# Patient Record
Sex: MALE | Race: WHITE | NOT HISPANIC OR LATINO | Employment: FULL TIME | URBAN - METROPOLITAN AREA
[De-identification: names, ages, dates, MRNs, and addresses within clinical notes are randomized per-mention and may not be internally consistent; named-entity substitution may affect disease eponyms.]

---

## 2018-01-09 NOTE — RESULT NOTES
Message   cxray shows evidence of chronic obstructive pulmonary disease without acute pulmonary infiltrates  labs wnl  Verified Results  * XR CHEST PA & LATERAL 21Jun2016 09:07AM Myranda Gold   TW Order Number: [id number]     Test Name Result Flag Reference   XR CHEST PA & LATERAL (Report)     CHEST - DUAL ENERGY     INDICATION: Chronic obstructive pulmonary disease     COMPARISON: April 29, 2015     VIEWS: PA (including soft tissue/bone algorithms) and lateral ; 4 images     FINDINGS:     The cardiomediastinal silhouette is unremarkable  Mild hyperaeration of the lungs without acute pulmonary consolidation  No pleural effusions  No pneumothorax  Bony thorax is unremarkable  IMPRESSION:     Evidence of chronic obstructive pulmonary disease without acute pulmonary infiltrates          Workstation performed: [id number]     Signed by:   Brian Nagy MD   6/22/16     (1) COMPREHENSIVE METABOLIC PANEL [id number] 20:27SU Myranda Gold     Test Name Result Flag Reference   Glucose, Serum 86 mg/dL  65-99   BUN 15 mg/dL  8-27   Creatinine, Serum 0 97 mg/dL  0 76-1 27   eGFR If NonAfricn Am 83 mL/min/1 73  >59   eGFR If Africn Am 96 mL/min/1 73  >59   BUN/Creatinine Ratio 15  10-22   Sodium, Serum 138 mmol/L  134-144   Potassium, Serum 5 3 mmol/L H 3 5-5 2   Chloride, Serum 99 mmol/L     Carbon Dioxide, Total 24 mmol/L  18-29   Calcium, Serum 9 7 mg/dL  8 6-10 2   Protein, Total, Serum 6 3 g/dL  6 0-8 5   Albumin, Serum 4 2 g/dL  3 6-4 8   Globulin, Total 2 1 g/dL  1 5-4 5   A/G Ratio 2 0  1 1-2 5   Bilirubin, Total <0 2 mg/dL  0 0-1 2   Alkaline Phosphatase, S 65 IU/L     AST (SGOT) 13 IU/L  0-40   ALT (SGPT) 9 IU/L  0-44     (1) CBC/PLT/DIFF 21Jun2016 12:00AM Myranda Gold     Test Name Result Flag Reference   WBC 7 1 x10E3/uL  3 4-10 8   RBC 4 93 x10E6/uL  4 14-5 80   Hemoglobin 14 8 g/dL  12 6-17 7   Hematocrit 43 8 %  37 5-51 0   MCV 89 fL  79-97   MCH 30 0 pg  26 6-33 0   MCHC 33 8 g/dL  31 5-35 7   RDW 14 1 %  12 3-15 4   Platelets 038 D31T2/BS  150-379   Neutrophils 54 %     Lymphs 31 %     Monocytes 9 %     Eos 5 %     Basos 1 %     Neutrophils (Absolute) 3 9 x10E3/uL  1 4-7 0   Lymphs (Absolute) 2 2 x10E3/uL  0 7-3 1   Monocytes(Absolute) 0 7 x10E3/uL  0 1-0 9   Eos (Absolute) 0 3 x10E3/uL  0 0-0 4   Baso (Absolute) 0 0 x10E3/uL  0 0-0 2   Immature Granulocytes 0 %     Immature Grans (Abs) 0 0 x10E3/uL  0 0-0 1     (1) CK (CPK) 21Jun2016 12:00AM Herrera Velázquez     Test Name Result Flag Reference   Creatine Kinase,Total,Serum 63 U/L

## 2018-01-15 NOTE — RESULT NOTES
Message   take Vitanmin D3 and also low cholesterol diet and f-up in office as directed to discuss labs if not already scheduled  He had labs ordered by Dr Karli Lockwood  Verified Results  ([address] [place] [address]) Lipid Panel [id number] 12:00AM Serena Baca     Test Name Result Flag Reference   Cholesterol, Total 252 mg/dL H 100-199   Triglycerides 147 mg/dL  0-149   HDL Cholesterol 64 mg/dL  >39   According to ATP-III Guidelines, HDL-C >59 mg/dL is considered a  negative risk factor for CHD     VLDL Cholesterol Fran 29 mg/dL  5-40   LDL Cholesterol Calc 159 mg/dL H 0-99

## 2018-02-16 ENCOUNTER — OFFICE VISIT (OUTPATIENT)
Dept: FAMILY MEDICINE CLINIC | Facility: CLINIC | Age: 63
End: 2018-02-16
Payer: COMMERCIAL

## 2018-02-16 VITALS — BODY MASS INDEX: 19.71 KG/M2 | DIASTOLIC BLOOD PRESSURE: 64 MMHG | SYSTOLIC BLOOD PRESSURE: 110 MMHG | WEIGHT: 137.4 LBS

## 2018-02-16 DIAGNOSIS — G89.29 CHRONIC MIDLINE THORACIC BACK PAIN: ICD-10-CM

## 2018-02-16 DIAGNOSIS — Z12.5 PROSTATE CANCER SCREENING: ICD-10-CM

## 2018-02-16 DIAGNOSIS — M54.2 CHRONIC NECK PAIN: ICD-10-CM

## 2018-02-16 DIAGNOSIS — G89.29 CHRONIC NECK PAIN: ICD-10-CM

## 2018-02-16 DIAGNOSIS — M54.50 CHRONIC BILATERAL LOW BACK PAIN WITHOUT SCIATICA: Primary | ICD-10-CM

## 2018-02-16 DIAGNOSIS — E78.00 PURE HYPERCHOLESTEROLEMIA: ICD-10-CM

## 2018-02-16 DIAGNOSIS — M54.6 CHRONIC MIDLINE THORACIC BACK PAIN: ICD-10-CM

## 2018-02-16 DIAGNOSIS — G89.29 CHRONIC BILATERAL LOW BACK PAIN WITHOUT SCIATICA: Primary | ICD-10-CM

## 2018-02-16 PROBLEM — J43.8 OTHER EMPHYSEMA (HCC): Status: ACTIVE | Noted: 2018-02-16

## 2018-02-16 PROCEDURE — 99214 OFFICE O/P EST MOD 30 MIN: CPT | Performed by: FAMILY MEDICINE

## 2018-02-16 RX ORDER — PAROXETINE HYDROCHLORIDE 20 MG/1
40 TABLET, FILM COATED ORAL DAILY
COMMUNITY
End: 2021-10-04

## 2018-02-16 RX ORDER — MELOXICAM 7.5 MG/1
7.5 TABLET ORAL 2 TIMES DAILY PRN
Qty: 60 TABLET | Refills: 1 | Status: SHIPPED | OUTPATIENT
Start: 2018-02-16 | End: 2021-10-04

## 2018-02-16 RX ORDER — GABAPENTIN 100 MG/1
100 CAPSULE ORAL
Qty: 30 CAPSULE | Refills: 2 | Status: SHIPPED | OUTPATIENT
Start: 2018-02-16 | End: 2018-03-16 | Stop reason: SDUPTHER

## 2018-02-16 RX ORDER — TAMSULOSIN HYDROCHLORIDE 0.4 MG/1
1 CAPSULE ORAL
COMMUNITY
Start: 2015-10-12 | End: 2018-02-16 | Stop reason: ALTCHOICE

## 2018-02-16 NOTE — PATIENT INSTRUCTIONS
Lower Back Exercises   AMBULATORY CARE:   Lower back exercises  help heal and strengthen your back muscles to prevent another injury  Ask your healthcare provider if you need to see a physical therapist for more advanced exercises  Seek care immediately if:   · You have severe pain that prevents you from moving  Contact your healthcare provider if:   · Your pain becomes worse  · You have new pain  · You have questions or concerns about your condition or care  Do lower back exercises safely:   · Do the exercises on a mat or firm surface  (not on a bed) to support your spine and prevent low back pain  · Move slowly and smoothly  Avoid fast or jerky motions  · Breathe normally  Do not hold your breath  · Stop if you feel pain  It is normal to feel some discomfort at first  Regular exercise will help decrease your discomfort over time  Lower back exercises: Your healthcare provider may recommend that you do back exercises 10 to 30 minutes each day  He may also recommend that you do exercises 1 to 3 times each day  Ask your healthcare provider which exercises are best for you and how often to do them  · Ankle pumps:  Lie on your back  Move your foot up (with your toes pointing toward your head)  Then, move your foot down (with your toes pointing away from you)  Repeat this exercise 10 times on each side  · Heel slides:  Lie on your back  Slowly bend one leg and then straighten it  Next, bend the other leg and then straighten it  Repeat 10 times on each side  · Pelvic tilt:  Lie on your back with your knees bent and feet flat on the floor  Place your arms in a relaxed position beside your body  Tighten the muscles of your abdomen and flatten your back against the floor  Hold for 5 seconds  Repeat 5 times  · Back stretch:  Lie on your back with your hands behind your head  Bend your knees and turn the lower half of your body to one side   Hold this position for 10 seconds  Repeat 3 times on each side  · Straight leg raises:  Lie on your back with one leg straight  Bend the other knee  Tighten your abdomen and then slowly lift the straight leg up about 6 to 12 inches off the floor  Hold for 1 to 5 seconds  Lower your leg slowly  Repeat 10 times on each leg  · Knee-to-chest:  Lie on your back with your knees bent and feet flat on the floor  Pull one of your knees toward your chest and hold it there for 5 seconds  Return your leg to the starting position  Lift the other knee toward your chest and hold for 5 seconds  Do this 5 times on each side  · Cat and camel:  Place your hands and knees on the floor  Arch your back upward toward the ceiling and lower your head  Round out your spine as much as you can  Hold for 5 seconds  Lift your head upward and push your chest downward toward the floor  Hold for 5 seconds  Do 3 sets or as directed  · Wall squats:  Stand with your back against a wall  Tighten the muscles of your abdomen  Slowly lower your body until your knees are bent at a 45 degree angle  Hold this position for 5 seconds  Slowly move back up to a standing position  Repeat 10 times  · Curl up:  Lie on your back with your knees bent and feet flat on the floor  Place your hands, palms down, underneath the curve in your lower back  Next, with your elbows on the floor, lift your shoulders and chest 2 to 3 inches  Keep your head in line with your shoulders  Hold this position for 5 seconds  When you can do this exercise without pain for 10 to 15 seconds, you may add a rotation  While your shoulders and chest are lifted off the ground, turn slightly to the left and hold  Repeat on the other side  · Bird dog:  Place your hands and knees on the floor  Keep your wrists directly below your shoulders and your knees directly below your hips  Pull your belly button in toward your spine  Do not flatten or arch your back   Tighten your abdominal muscles  Raise one arm straight out so that it is aligned with your head  Next, raise the leg opposite your arm  Hold this position for 15 seconds  Lower your arm and leg slowly and change sides  Do 5 sets  © 2017 [employer] Information is for End User's use only and may not be sold, redistributed or otherwise used for commercial purposes  All illustrations and images included in CareNotes® are the copyrighted property of A D A M , Inc  or Manny Garrett  The above information is an  only  It is not intended as medical advice for individual conditions or treatments  Talk to your doctor, nurse or pharmacist before following any medical regimen to see if it is safe and effective for you

## 2018-02-16 NOTE — ASSESSMENT & PLAN NOTE
Patient having issues with his breathing  Previous chest x-ray showed COPD  Start him on Spiriva 1 25-2 samples provided  Recheck in 4 months  May do pulmonary function testing  May need updated CT scan  Labs also ordered since he is overdue for routine preventative healthcare

## 2018-02-16 NOTE — PROGRESS NOTES
Assessment/Plan:      Diagnoses and all orders for this visit:    Chronic bilateral low back pain without sciatica  -     gabapentin (NEURONTIN) 100 mg capsule; Take 1 capsule (100 mg total) by mouth daily at bedtime  -     Comprehensive metabolic panel; Future  -     CBC; Future  -     Ambulatory referral to Physical Therapy; Future  -     meloxicam (MOBIC) 7 5 mg tablet; Take 1 tablet (7 5 mg total) by mouth 2 (two) times a day as needed (for back pain)    Pure hypercholesterolemia  -     Comprehensive metabolic panel; Future  -     Lipid panel; Future  -     TSH, 3rd generation; Future    Prostate cancer screening  -     PSA Total (Reflex To Free); Future    Chronic neck pain  -     Ambulatory referral to Physical Therapy; Future  -     meloxicam (MOBIC) 7 5 mg tablet; Take 1 tablet (7 5 mg total) by mouth 2 (two) times a day as needed (for back pain)    Chronic midline thoracic back pain  -     Ambulatory referral to Physical Therapy; Future  -     meloxicam (MOBIC) 7 5 mg tablet; Take 1 tablet (7 5 mg total) by mouth 2 (two) times a day as needed (for back pain)    Other orders  -     Discontinue: Umeclidinium-Vilanterol (ANORO ELLIPTA) 62 5-25 MCG/INH AEPB; Inhale 1 puff daily  -     PARoxetine (PAXIL) 20 mg tablet; Take 40 mg by mouth daily    -     Discontinue: tamsulosin (FLOMAX) 0 4 mg; Take 1 capsule by mouth          Subjective:     Patient ID: Iqra Shin  is a 57 y o  male  Patient here for evaluation of acute on top of chronic low back pain  Also mentions chronic neck pain and mid thoracic pain  Also mentions in the past that his breathing has not been good  Believes he took Anuro  Patient states back in [place] [facility] had an injury at work  His back was dealt with it workman's comp case  He says he had some sort of disc issue between L5 and S1  He has not had any regular routine care  He is overdue for regular general healthcare  No labs in over 1 year          Review of Systems Constitutional: Negative  HENT: Negative  Respiratory: Positive for cough and chest tightness  Negative for shortness of breath and wheezing  Cardiovascular: Negative  Gastrointestinal: Negative  Genitourinary: Negative  Musculoskeletal: Positive for back pain  Neurological: Negative  Psychiatric/Behavioral:        History of bipolar disease-on medication  Objective:     Physical Exam   Constitutional: He is oriented to person, place, and time  He appears distressed  Patient is slightly disheveled  Looks to be uncomfortable regarding pain   HENT:   Head: Normocephalic  Eyes: Conjunctivae are normal    Neck: Normal range of motion  Cardiovascular: Normal rate, regular rhythm and normal heart sounds  Pulmonary/Chest: Breath sounds normal  He has no wheezes  Abdominal: Soft  Musculoskeletal: He exhibits no edema  Decreased lumbar range of motion  Negative straight leg raising  Neurological: He is oriented to person, place, and time  He has normal reflexes  Skin: Skin is warm  He is not diaphoretic  Psychiatric: He has a normal mood and affect   His behavior is normal  Thought content normal

## 2018-02-16 NOTE — ASSESSMENT & PLAN NOTE
Patient has chronic pain greater than 20 years more so in low back but also in neck and mid back  Has not been using any medications for this  Having a difficult time sleeping  We will start him on gabapentin 100 mg at night to help him with pain and sleep  We will also start meloxicam twice a day  We will refer him for physical therapy for all 3 regions  Recheck him back in 1 month  If not improved will order MRIs in the appropriate area NC within next at this regarding treatment

## 2018-02-26 ENCOUNTER — EVALUATION (OUTPATIENT)
Dept: PHYSICAL THERAPY | Facility: CLINIC | Age: 63
End: 2018-02-26
Payer: COMMERCIAL

## 2018-02-26 DIAGNOSIS — M54.50 CHRONIC LOW BACK PAIN WITHOUT SCIATICA, UNSPECIFIED BACK PAIN LATERALITY: Primary | ICD-10-CM

## 2018-02-26 DIAGNOSIS — M54.6 PAIN IN THORACIC SPINE: ICD-10-CM

## 2018-02-26 DIAGNOSIS — M54.2 CERVICALGIA: ICD-10-CM

## 2018-02-26 DIAGNOSIS — G89.29 CHRONIC LOW BACK PAIN WITHOUT SCIATICA, UNSPECIFIED BACK PAIN LATERALITY: Primary | ICD-10-CM

## 2018-02-26 PROCEDURE — G8985 CARRY GOAL STATUS: HCPCS | Performed by: PHYSICAL THERAPIST

## 2018-02-26 PROCEDURE — G8984 CARRY CURRENT STATUS: HCPCS | Performed by: PHYSICAL THERAPIST

## 2018-02-26 PROCEDURE — 97140 MANUAL THERAPY 1/> REGIONS: CPT | Performed by: PHYSICAL THERAPIST

## 2018-02-26 PROCEDURE — 97162 PT EVAL MOD COMPLEX 30 MIN: CPT | Performed by: PHYSICAL THERAPIST

## 2018-02-26 NOTE — PROGRESS NOTES
PT Evaluation     Today's date: 2018  Patient name: Shane Otoole  : 1955  MRN: [id number]  Referring provider: Erika Melara DO  Dx:   Encounter Diagnosis     ICD-10-CM    1  Chronic low back pain without sciatica, unspecified back pain laterality M54 5     G89 29    2  Cervicalgia M54 2    3  Pain in thoracic spine M54 6               Assessment  Impairments: abnormal or restricted ROM, activity intolerance and pain with function    Assessment details: Pt is a 57year old male presenting to PT with low back pain  Pt states he also has chronic neck/mid thoracic pain  He displays back pain, decreased lumbar/thoracic range of motion, impaired posture, decreased lower extremity strength, and decreased tolerance to activity  Patient would benefit from skilled PT services to address these issues and to maximize function  Thank you for the referral      Understanding of Dx/Px/POC: good   Prognosis: fair    Goals  STG  1  Decrease pain 20-50% in 4 weeks  2  Postural control is improved in 4 weeks  3  Range of motion is improved by 50% in 4 weeks    LTG  1  IADL performance in related activities is improved to maximal level of function  2  Patient is independent with HEP  3  Lifting is improved to maximal level of function    Plan  Planned modality interventions: thermotherapy: hydrocollator packs (prn)  Planned therapy interventions: manual therapy, joint mobilization, postural training, strengthening, therapeutic exercise and home exercise program  Frequency: 2-3x/week  Duration in weeks: 8        Subjective Evaluation    History of Present Illness  Date of onset: 1990  Mechanism of injury: Pt is a 57year old male presenting to PT with low back pain  Pt states he also has chronic neck/mid thoracic pain  Patient states back in [place] [facility] had an injury at work, when he was lifting a heavy pipe to place into a spool  He felt a significant pain in his low back    He says he had some sort of disc issue between L5 and S1, possibly "ruptured disc"  He had 6 months of PT at that time, but states minimal improvement  No surgery at that time  He has had pain since that time  He reports recently he has become "more aware" of his pain  Pt worked in manufacturing  He states now pain is localized to low back/thoracic spine and cervical spine  Denies numbness/tingling into extremities  Symptom AGGS: pt states pain is constant, lifting, push/pull  Symptom EASE: meloxicam/gabapentin prescribed by PCP, states minimal improvement  No recent imaging performed at this time  Follow-up with MD scheduled for 3/16/18, pt states he will have possible x-ray at that time  Pt currently retired  PMH significant for COPD and bipolar disorder, pt follows up with psychiatrist and takes medication for COPD and bipolar disorder       Pain  Current pain ratin  At best pain rating: 3  At worst pain ratin  Quality: pressure and dull ache    Social Support  Lives with: spouse    Hand dominance: right      Diagnostic Tests  No diagnostic tests performed  Treatments  Previous treatment: physical therapy  Patient Goals  Patient goals for therapy: decreased pain, increased motion, independence with ADLs/IADLs and return to sport/leisure activities          Objective     Strength/Myotome Testing     Left Hip   Planes of Motion   Flexion: 4  External rotation: 4  Internal rotation: 4+    Right Hip   Planes of Motion   Flexion: 4  External rotation: 4  Internal rotation: 4+    General Comments     Lumbar Comments  Standing observation: significant increase in thoracic kyphosis with lumbar lordosis, slightly increased pelvic height on right  Lumbar AROM:    Flexion 100%, increased time    Extension 50%    Lateral flexion 50% bilateral, pain at end ranges  (+) pelvic shear test  Thoracic rotation: 50% limited bilaterally  (-) slump (-) SLR  Slightly reduced hamstring length bilaterally  Pain at end ranges of hip PROM into flexion  Significant limitation into hip IR PROM, pain at end ranges  Reduced quad length bilaterally L>R  No tenderness to palpation piriformis or gluteal musculature  Slight R rotation of lower thoracic/lumbar spine  Significant limitation in thoracic spine mobility, diffuse hypomobility with PA testing to thoracic spine  Severe tenderness to palpation bilateral psoas       Flowsheet Rows    Flowsheet Row Most Recent Value   PT/OT G-Codes   Current Score  57   Projected Score  62   FOTO information reviewed  Yes   Assessment Type  Evaluation   G code set  Other PT/OT Primary   Carrying, Moving and Handling Objects Current Status ()  CK   Carrying, Moving and Handling Objects Goal Status ()  CJ        Precautions: anxiety/depression, bipolar disorder (following up with psychiatrist, on medication), COPD    Daily Treatment Diary     Manual  2/26            Hip IR PROM stretching prone over pillows    Psoas STM (assess tolerance to IE) NV                                                                    Exercise Diary  2/26            Thoracic ext over 1/2 pillow 2' f/b LTR :[id number]            Thoracic ext stretch EOT NV            Hip flexor stretch NV            Foam roller series NV            PPT over pillow roll NV            Resisted hip iso flexion over pillow roll NV            Postural strengthening as tolerated                                                                                                                                                                                      POC to focus on thoracic extension mobility, core stabilization, lower extremity flexibility/strengthening    Modalities  2/26            MHP pre-tx to thoracic/low back NV                                        HEP: thoracic ext over 1/2 pillow f/b LTR

## 2018-02-28 ENCOUNTER — OFFICE VISIT (OUTPATIENT)
Dept: PHYSICAL THERAPY | Facility: CLINIC | Age: 63
End: 2018-02-28
Payer: COMMERCIAL

## 2018-02-28 DIAGNOSIS — M54.6 PAIN IN THORACIC SPINE: ICD-10-CM

## 2018-02-28 DIAGNOSIS — G89.29 CHRONIC LOW BACK PAIN WITHOUT SCIATICA, UNSPECIFIED BACK PAIN LATERALITY: Primary | ICD-10-CM

## 2018-02-28 DIAGNOSIS — M54.50 CHRONIC LOW BACK PAIN WITHOUT SCIATICA, UNSPECIFIED BACK PAIN LATERALITY: Primary | ICD-10-CM

## 2018-02-28 DIAGNOSIS — M54.2 CERVICALGIA: ICD-10-CM

## 2018-02-28 PROCEDURE — 97110 THERAPEUTIC EXERCISES: CPT

## 2018-02-28 PROCEDURE — 97010 HOT OR COLD PACKS THERAPY: CPT

## 2018-02-28 PROCEDURE — 97140 MANUAL THERAPY 1/> REGIONS: CPT

## 2018-02-28 NOTE — PROGRESS NOTES
Daily Note     Today's date: 2018  Patient name: Virgil Castelan  : 1955  MRN: [id number]  Referring provider: Kaleb Escobar DO  Dx:   Encounter Diagnosis     ICD-10-CM    1  Chronic low back pain without sciatica, unspecified back pain laterality M54 5     G89 29    2  Cervicalgia M54 2    3  Pain in thoracic spine M54 6                   Subjective: Patient reported minimal changes in symptoms since initial eval  Compliant to home program with minimal difficulty  Objective: See treatment diary below  Progressed program       Assessment: Tolerated treatment fair  Moderated tenderness to palpation b/l psoas with limited hip IR mobility  Provided with cueing to ensure proper form with progression of exercises  Patient would benefit from continued PT      Plan: Continue per plan of care  Progress treatment as tolerated           Precautions: anxiety/depression, bipolar disorder (following up with psychiatrist, on medication), COPD    Daily Treatment Diary     Manual             Hip IR PROM stretching prone over pillows    Psoas STM NV 15 min                                                                   Exercise Diary             Thoracic ext over 1/2 pillow 2' f/b LTR :[id number] 2 min f/b LTR 5"x10 ea           Thoracic ext stretch EOT NV 10"x  10           Hip flexor stretch EOT NV 2 min ea           Foam roller series NV NV           PPT over pillow roll NV 10"x  10           Resisted hip iso flexion over pillow roll NV 5-10"x10 ea           Postural strengthening as tolerated                                                                                                                                                                                      POC to focus on thoracic extension mobility, core stabilization, lower extremity flexibility / strengthening    Modalities             MHP to thoracic / LB supine 90/90 NV 10 min pre tx Updated and reviewed home program with patient    HEP: thoracic ext over 1/2 pillow f/b LTR, hip flexor stretch EOT, PPT, resisted hip flex iso, thoracic ext stretch EOT

## 2018-03-06 ENCOUNTER — OFFICE VISIT (OUTPATIENT)
Dept: PHYSICAL THERAPY | Facility: CLINIC | Age: 63
End: 2018-03-06
Payer: COMMERCIAL

## 2018-03-06 DIAGNOSIS — G89.29 CHRONIC LOW BACK PAIN WITHOUT SCIATICA, UNSPECIFIED BACK PAIN LATERALITY: Primary | ICD-10-CM

## 2018-03-06 DIAGNOSIS — M54.2 CERVICALGIA: ICD-10-CM

## 2018-03-06 DIAGNOSIS — M54.50 CHRONIC LOW BACK PAIN WITHOUT SCIATICA, UNSPECIFIED BACK PAIN LATERALITY: Primary | ICD-10-CM

## 2018-03-06 DIAGNOSIS — M54.6 PAIN IN THORACIC SPINE: ICD-10-CM

## 2018-03-06 PROCEDURE — 97110 THERAPEUTIC EXERCISES: CPT | Performed by: PHYSICAL THERAPIST

## 2018-03-06 PROCEDURE — 97140 MANUAL THERAPY 1/> REGIONS: CPT | Performed by: PHYSICAL THERAPIST

## 2018-03-06 PROCEDURE — 97010 HOT OR COLD PACKS THERAPY: CPT | Performed by: PHYSICAL THERAPIST

## 2018-03-06 NOTE — PROGRESS NOTES
Daily Note     Today's date: 3/6/2018  Patient name: Grover Mcmullen  : 1955  MRN: [id number]  Referring provider: Ze Hill DO  Dx:   Encounter Diagnosis     ICD-10-CM    1  Chronic low back pain without sciatica, unspecified back pain laterality M54 5     G89 29    2  Cervicalgia M54 2    3  Pain in thoracic spine M54 6                   Subjective: Patient reports continued soreness in low/mid back at pre-tx, stating no increase in pain from previous visit  Objective: See treatment diary below  Assessment: Tolerated treatment well  Moderated tenderness to palpation b/l psoas, hip IR mobility gradually improving  Pt requires cueing for proper technique with exercises  Patient would benefit from continued PT      Plan: Continue per plan of care  Progress treatment as tolerated           Precautions: anxiety/depression, bipolar disorder (following up with psychiatrist, on medication), COPD    Daily Treatment Diary     Manual   3          Hip IR PROM stretching prone over pillows    Psoas STM NV 15 min 15 min                                                                   Exercise Diary   3          Thoracic ext over 1/2 pillow 2' f/b LTR :[id number] 2 min f/b LTR 5"x10 ea 2 min f/b LTR 5"x10 ea          Thoracic ext stretch EOT NV 10"x  10 10"x  10          Hip flexor stretch EOT NV 2 min ea 2 min ea          Foam roller series NV NV NV          PPT over pillow roll NV 10"x  10 10"x  10          Resisted hip iso flexion over pillow roll NV 5-10"x10 ea :[id number]          Postural strengthening as tolerated                                                                                                                                                                                      POC to focus on thoracic extension mobility, core stabilization, lower extremity flexibility / strengthening    Modalities   3/6          MHP to thoracic / LB supine 90/90 NV 10 min pre tx 10 min pre tx                                      Updated and reviewed home program with patient    HEP: thoracic ext over 1/2 pillow f/b LTR, hip flexor stretch EOT, PPT, resisted hip flex iso, thoracic ext stretch EOT

## 2018-03-09 ENCOUNTER — OFFICE VISIT (OUTPATIENT)
Dept: PHYSICAL THERAPY | Facility: CLINIC | Age: 63
End: 2018-03-09
Payer: COMMERCIAL

## 2018-03-09 DIAGNOSIS — M54.2 CERVICALGIA: ICD-10-CM

## 2018-03-09 DIAGNOSIS — M54.50 CHRONIC LOW BACK PAIN WITHOUT SCIATICA, UNSPECIFIED BACK PAIN LATERALITY: Primary | ICD-10-CM

## 2018-03-09 DIAGNOSIS — M54.6 PAIN IN THORACIC SPINE: ICD-10-CM

## 2018-03-09 DIAGNOSIS — G89.29 CHRONIC LOW BACK PAIN WITHOUT SCIATICA, UNSPECIFIED BACK PAIN LATERALITY: Primary | ICD-10-CM

## 2018-03-09 PROCEDURE — 97010 HOT OR COLD PACKS THERAPY: CPT | Performed by: PHYSICAL THERAPIST

## 2018-03-09 PROCEDURE — 97140 MANUAL THERAPY 1/> REGIONS: CPT | Performed by: PHYSICAL THERAPIST

## 2018-03-09 PROCEDURE — 97110 THERAPEUTIC EXERCISES: CPT | Performed by: PHYSICAL THERAPIST

## 2018-03-09 NOTE — PROGRESS NOTES
Daily Note     Today's date: 3/9/2018  Patient name: Nadia Robles  : 1955  MRN: [id number]  Referring provider: Stephanie Lamar DO  Dx:   Encounter Diagnosis     ICD-10-CM    1  Chronic low back pain without sciatica, unspecified back pain laterality M54 5     G89 29    2  Cervicalgia M54 2    3  Pain in thoracic spine M54 6               Subjective: Patient reports continued soreness in low/mid back at pre-tx, stating no increase in pain from previous visit  Objective: See treatment diary below  Assessment: Tolerated treatment well  Good tolerance to ex program, requiring min cueing for proper technique  Cont'd tenderness present in b/l psoas, hip IR mobility improving  Patient would benefit from continued PT      Plan: Continue per plan of care  Progress treatment as tolerated           Precautions: anxiety/depression, bipolar disorder (following up with psychiatrist, on medication), COPD    Daily Treatment Diary     Manual  2/26 2/28 3/6 3/9         Hip IR PROM stretching prone over pillows    Psoas STM NV 15 min 15 min  10 min                                                                 Exercise Diary  2/26 2/28 3/6 3/9         Thoracic ext over 1/2 pillow 2' f/b LTR :[id number] 2 min f/b LTR 5"x10 ea 2 min f/b LTR 5"x10 ea 2 min f/b LTR 5"x10 ea         Thoracic ext stretch EOT NV 10"x  10 10"x  10 :10x10         Hip flexor stretch EOT NV 2 min ea 2 min ea 2 min ea         Foam roller series NV NV NV NV         PPT over pillow roll NV 10"x  10 10"x  10 10"x  10         Resisted hip iso flexion over pillow roll NV 5-10"x10 ea :[id number]         Postural strengthening as tolerated    NV                                                                                                                                                                                  POC to focus on thoracic extension mobility, core stabilization, lower extremity flexibility / strengthening    Modalities  2/26 2/28 3/6 3/9         MHP to thoracic / LB supine 90/90 NV 10 min pre tx 10 min pre tx 10 min pre tx                                       HEP: thoracic ext over 1/2 pillow f/b LTR, hip flexor stretch EOT, PPT, resisted hip flex iso, thoracic ext stretch EOT

## 2018-03-12 LAB
ALBUMIN SERPL-MCNC: 4.7 G/DL (ref 3.6–4.8)
ALBUMIN/GLOB SERPL: 2.2 {RATIO} (ref 1.2–2.2)
ALP SERPL-CCNC: 77 IU/L (ref 39–117)
ALT SERPL-CCNC: 11 IU/L (ref 0–44)
AMBIG ABBREV DEFAULT: NORMAL
AMBIG ABBREV DEFAULT: NORMAL
AST SERPL-CCNC: 18 IU/L (ref 0–40)
BASOPHILS # BLD AUTO: 0 X10E3/UL (ref 0–0.2)
BASOPHILS NFR BLD AUTO: 1 %
BILIRUB SERPL-MCNC: 0.2 MG/DL (ref 0–1.2)
BUN SERPL-MCNC: 14 MG/DL (ref 8–27)
BUN/CREAT SERPL: 16 (ref 10–24)
CALCIUM SERPL-MCNC: 9.6 MG/DL (ref 8.6–10.2)
CHLORIDE SERPL-SCNC: 102 MMOL/L (ref 96–106)
CHOLEST SERPL-MCNC: 264 MG/DL (ref 100–199)
CO2 SERPL-SCNC: 26 MMOL/L (ref 18–29)
CREAT SERPL-MCNC: 0.89 MG/DL (ref 0.76–1.27)
EOSINOPHIL # BLD AUTO: 0.4 X10E3/UL (ref 0–0.4)
EOSINOPHIL NFR BLD AUTO: 6 %
ERYTHROCYTE [DISTWIDTH] IN BLOOD BY AUTOMATED COUNT: 14.8 % (ref 12.3–15.4)
GLOBULIN SER-MCNC: 2.1 G/DL (ref 1.5–4.5)
GLUCOSE SERPL-MCNC: 98 MG/DL (ref 65–99)
HCT VFR BLD AUTO: 48.2 % (ref 37.5–51)
HDLC SERPL-MCNC: 85 MG/DL
HGB BLD-MCNC: 16.3 G/DL (ref 13–17.7)
IMM GRANULOCYTES # BLD: 0 X10E3/UL (ref 0–0.1)
IMM GRANULOCYTES NFR BLD: 0 %
LDLC SERPL CALC-MCNC: 157 MG/DL (ref 0–99)
LYMPHOCYTES # BLD AUTO: 2.1 X10E3/UL (ref 0.7–3.1)
LYMPHOCYTES NFR BLD AUTO: 29 %
MCH RBC QN AUTO: 31 PG (ref 26.6–33)
MCHC RBC AUTO-ENTMCNC: 33.8 G/DL (ref 31.5–35.7)
MCV RBC AUTO: 92 FL (ref 79–97)
MONOCYTES # BLD AUTO: 0.7 X10E3/UL (ref 0.1–0.9)
MONOCYTES NFR BLD AUTO: 9 %
NEUTROPHILS # BLD AUTO: 3.8 X10E3/UL (ref 1.4–7)
NEUTROPHILS NFR BLD AUTO: 55 %
PLATELET # BLD AUTO: 266 X10E3/UL (ref 150–379)
POTASSIUM SERPL-SCNC: 5.4 MMOL/L (ref 3.5–5.2)
PROT SERPL-MCNC: 6.8 G/DL (ref 6–8.5)
PSA SERPL-MCNC: 1 NG/ML (ref 0–4)
RBC # BLD AUTO: 5.26 X10E6/UL (ref 4.14–5.8)
SL AMB EGFR AFRICAN AMERICAN: 104 ML/MIN/1.73
SL AMB EGFR NON AFRICAN AMERICAN: 90 ML/MIN/1.73
SL AMB REFLEX CRITERIA: NORMAL
SL AMB VLDL CHOLESTEROL CALC: 22 MG/DL (ref 5–40)
SODIUM SERPL-SCNC: 144 MMOL/L (ref 134–144)
TRIGL SERPL-MCNC: 111 MG/DL (ref 0–149)
TSH SERPL DL<=0.005 MIU/L-ACNC: 1.48 UIU/ML (ref 0.45–4.5)
WBC # BLD AUTO: 7 X10E3/UL (ref 3.4–10.8)

## 2018-03-13 ENCOUNTER — APPOINTMENT (OUTPATIENT)
Dept: PHYSICAL THERAPY | Facility: CLINIC | Age: 63
End: 2018-03-13
Payer: COMMERCIAL

## 2018-03-15 ENCOUNTER — APPOINTMENT (OUTPATIENT)
Dept: PHYSICAL THERAPY | Facility: CLINIC | Age: 63
End: 2018-03-15
Payer: COMMERCIAL

## 2018-03-16 ENCOUNTER — OFFICE VISIT (OUTPATIENT)
Dept: PHYSICAL THERAPY | Facility: CLINIC | Age: 63
End: 2018-03-16
Payer: COMMERCIAL

## 2018-03-16 ENCOUNTER — APPOINTMENT (OUTPATIENT)
Dept: RADIOLOGY | Facility: MEDICAL CENTER | Age: 63
End: 2018-03-16
Payer: COMMERCIAL

## 2018-03-16 ENCOUNTER — OFFICE VISIT (OUTPATIENT)
Dept: FAMILY MEDICINE CLINIC | Facility: CLINIC | Age: 63
End: 2018-03-16
Payer: COMMERCIAL

## 2018-03-16 VITALS
BODY MASS INDEX: 20.04 KG/M2 | WEIGHT: 140 LBS | SYSTOLIC BLOOD PRESSURE: 110 MMHG | HEIGHT: 70 IN | DIASTOLIC BLOOD PRESSURE: 70 MMHG

## 2018-03-16 DIAGNOSIS — G89.29 CHRONIC NECK PAIN: ICD-10-CM

## 2018-03-16 DIAGNOSIS — M54.50 CHRONIC MIDLINE LOW BACK PAIN WITHOUT SCIATICA: Primary | ICD-10-CM

## 2018-03-16 DIAGNOSIS — G89.29 CHRONIC LOW BACK PAIN WITHOUT SCIATICA, UNSPECIFIED BACK PAIN LATERALITY: Primary | ICD-10-CM

## 2018-03-16 DIAGNOSIS — G89.29 CHRONIC MIDLINE THORACIC BACK PAIN: ICD-10-CM

## 2018-03-16 DIAGNOSIS — M54.50 CHRONIC BILATERAL LOW BACK PAIN WITHOUT SCIATICA: ICD-10-CM

## 2018-03-16 DIAGNOSIS — M54.50 CHRONIC MIDLINE LOW BACK PAIN WITHOUT SCIATICA: ICD-10-CM

## 2018-03-16 DIAGNOSIS — E78.5 HYPERLIPIDEMIA LDL GOAL <130: ICD-10-CM

## 2018-03-16 DIAGNOSIS — G89.29 CHRONIC MIDLINE LOW BACK PAIN WITHOUT SCIATICA: ICD-10-CM

## 2018-03-16 DIAGNOSIS — M54.6 CHRONIC MIDLINE THORACIC BACK PAIN: ICD-10-CM

## 2018-03-16 DIAGNOSIS — M54.2 CHRONIC NECK PAIN: ICD-10-CM

## 2018-03-16 DIAGNOSIS — J43.8 OTHER EMPHYSEMA (HCC): ICD-10-CM

## 2018-03-16 DIAGNOSIS — G89.29 CHRONIC BILATERAL LOW BACK PAIN WITHOUT SCIATICA: ICD-10-CM

## 2018-03-16 DIAGNOSIS — M54.6 PAIN IN THORACIC SPINE: ICD-10-CM

## 2018-03-16 DIAGNOSIS — M54.50 CHRONIC LOW BACK PAIN WITHOUT SCIATICA, UNSPECIFIED BACK PAIN LATERALITY: Primary | ICD-10-CM

## 2018-03-16 DIAGNOSIS — G89.29 CHRONIC MIDLINE LOW BACK PAIN WITHOUT SCIATICA: Primary | ICD-10-CM

## 2018-03-16 DIAGNOSIS — M54.2 CERVICALGIA: ICD-10-CM

## 2018-03-16 PROBLEM — J43.9 PULMONARY EMPHYSEMA (HCC): Status: ACTIVE | Noted: 2018-02-16

## 2018-03-16 PROCEDURE — 72050 X-RAY EXAM NECK SPINE 4/5VWS: CPT

## 2018-03-16 PROCEDURE — 97140 MANUAL THERAPY 1/> REGIONS: CPT | Performed by: PHYSICAL THERAPIST

## 2018-03-16 PROCEDURE — 72110 X-RAY EXAM L-2 SPINE 4/>VWS: CPT

## 2018-03-16 PROCEDURE — 97010 HOT OR COLD PACKS THERAPY: CPT | Performed by: PHYSICAL THERAPIST

## 2018-03-16 PROCEDURE — 99214 OFFICE O/P EST MOD 30 MIN: CPT | Performed by: FAMILY MEDICINE

## 2018-03-16 PROCEDURE — 97110 THERAPEUTIC EXERCISES: CPT | Performed by: PHYSICAL THERAPIST

## 2018-03-16 PROCEDURE — 72072 X-RAY EXAM THORAC SPINE 3VWS: CPT

## 2018-03-16 RX ORDER — GABAPENTIN 100 MG/1
CAPSULE ORAL
Qty: 150 CAPSULE | Refills: 5 | Status: SHIPPED | OUTPATIENT
Start: 2018-03-16 | End: 2021-10-04

## 2018-03-16 NOTE — ASSESSMENT & PLAN NOTE
Patient has no improvement after 2 weeks of physical therapy  He states the Mobic is not helping his pain  At this point will get x-rays of the lumbar spine since he also has complaints of chronic neck and thoracic pain we will also x-ray that region  He then may need an MRI of the lumbar spine and then referral to [facility] for further evaluation  We will also wean the patient's gabapentin up from 100 to a total of 500 daily with 100 pay can in the morning 100 in the afternoon and 300 at bedtime

## 2018-03-16 NOTE — ASSESSMENT & PLAN NOTE
Patient feels his breathing has improved on Spiriva 1 25   3 more bottles of samples have been provided  Try to do PFTs next time we see him

## 2018-03-16 NOTE — PROGRESS NOTES
Assessment/Plan:    Chronic midline low back pain without sciatica  Patient has no improvement after 2 weeks of physical therapy  He states the Mobic is not helping his pain  At this point will get x-rays of the lumbar spine since he also has complaints of chronic neck and thoracic pain we will also x-ray that region  He then may need an MRI of the lumbar spine and then referral to [facility] for further evaluation  We will also wean the patient's gabapentin up from 100 to a total of 500 daily with 100 pay can in the morning 100 in the afternoon and 300 at bedtime  Other emphysema ([address] )  Patient feels his breathing has improved on Spiriva 1 25   3 more bottles of samples have been provided  Try to do PFTs next time we see him  Hyperlipidemia LDL goal <130  Patient's LDL is 157 but his HDL is 85  Patient is not interested in taking medication  Recheck in 6 months after risk factor modification  Diagnoses and all orders for this visit:    Chronic midline low back pain without sciatica  -     XR spine lumbar minimum 4 views non injury; Future    Chronic neck pain  -     XR spine cervical complete 4 or 5 vw non injury; Future    Chronic midline thoracic back pain  -     XR spine thoracic 3 vw; Future    Other emphysema (HCC)    Hyperlipidemia LDL goal <130        There are no Patient Instructions on file for this visit  Subjective:        Patient ID: Enrique Salazar  is a 57 y o  male  Chief Complaint   Patient presents with    Follow-up     1 month       10-year-old male here for 1 month check regarding back pain and neck pain  States physical therapy after 2 weeks is not helping him at all  States the Mobic is not helping him at all  Regarding his inhaler he does like the way Spiriva helps his breathing          The following portions of the patient's history were reviewed and updated as appropriate: past medical history, past surgical history and problem list       Review of Systems   Constitutional: Negative for appetite change, fatigue, fever and unexpected weight change  HENT: Negative for congestion  Eyes: Negative for redness and visual disturbance  Respiratory: Negative for chest tightness and shortness of breath  Cardiovascular: Negative for chest pain, palpitations and leg swelling  Gastrointestinal: Negative for abdominal distention, abdominal pain, diarrhea and nausea  Endocrine: Negative for cold intolerance and heat intolerance  Genitourinary: Negative for dysuria and hematuria  Musculoskeletal: Positive for back pain  Negative for arthralgias, gait problem and myalgias  Skin: Negative for pallor and rash  Neurological: Negative for dizziness and headaches  Psychiatric/Behavioral: Negative for behavioral problems  The patient is nervous/anxious  Objective:  /70 (BP Location: Left arm, Patient Position: Sitting, Cuff Size: Standard)   Ht 5' 10" (1 778 m)   Wt 63 5 kg (140 lb)   BMI 20 09 kg/m²        Physical Exam   Constitutional: He is oriented to person, place, and time  He appears well-developed and well-nourished  No distress  HENT:   Head: Normocephalic  Right Ear: External ear normal    Left Ear: External ear normal    Mouth/Throat: Oropharynx is clear and moist    Eyes: EOM are normal  Pupils are equal, round, and reactive to light  No scleral icterus  Neck: Normal range of motion  Neck supple  Cardiovascular: Normal rate, regular rhythm and intact distal pulses  No murmur heard  Pulmonary/Chest: Effort normal and breath sounds normal  He has no wheezes  Abdominal: Soft  Bowel sounds are normal    Musculoskeletal: Normal range of motion  He exhibits no edema  Lymphadenopathy:     He has no cervical adenopathy  Neurological: He is alert and oriented to person, place, and time  He displays normal reflexes  Coordination normal    Skin: Skin is warm  No pallor     Psychiatric: He has a normal mood and affect   His behavior is normal  Thought content normal

## 2018-03-16 NOTE — PROGRESS NOTES
Daily Note     Today's date: 3/16/2018  Patient name: Bernie Wright  : 1955  MRN: [id number]  Referring provider: Heri Brown DO  Dx:   Encounter Diagnosis     ICD-10-CM    1  Chronic low back pain without sciatica, unspecified back pain laterality M54 5     G89 29    2  Cervicalgia M54 2    3  Pain in thoracic spine M54 6               Subjective: Patient states continued soreness in low/mid back  States he is returning to MD today  Objective: See treatment diary below  Assessment: Tolerated treatment well  Good tolerance to ex program, requiring min cueing for proper technique  Cont'd tenderness present in b/l psoas, not as much soft tissue restriction present  Hip IR mobility improving  Patient would benefit from continued PT  Plan: Continue per plan of care  Progress treatment as tolerated           Precautions: anxiety/depression, bipolar disorder (following up with psychiatrist, on medication), COPD    Daily Treatment Diary     Manual  2/26 2/28 3/6 3/9 3/16        Hip IR PROM stretching prone over pillows    Psoas STM NV 15 min 15 min  10 min 10 min                                                                 Exercise Diary  2/26 2/28 3/6 3/9 3/16        Thoracic ext over 1/2 pillow 2' f/b LTR :[id number] 2 min f/b LTR 5"x10 ea 2 min f/b LTR 5"x10 ea 2 min f/b LTR 5"x10 ea 2 min f/b LTR 5"x10 ea        Thoracic ext stretch EOT NV 10"x  10 10"x  10 :10x10 :10x10        Hip flexor stretch EOT NV 2 min ea 2 min ea 2 min ea 2 min ea        Foam roller series NV NV NV NV NV        PPT over pillow roll NV 10"x  10 10"x  10 10"x  10 10"x  10        Resisted hip iso flexion over pillow roll NV 5-10"x10 ea :[id number] :[id number] :[id number]        Postural strengthening as tolerated    NV                                                                                                                                                                                  POC to focus on thoracic extension mobility, core stabilization, lower extremity flexibility / strengthening    Modalities  2/26 2/28 3/6 3/9 3/16        MHP to thoracic / LB supine 90/90 NV 10 min pre tx 10 min pre tx 10 min pre tx 10 min pre tx                                      HEP: thoracic ext over 1/2 pillow f/b LTR, hip flexor stretch EOT, PPT, resisted hip flex iso, thoracic ext stretch EOT

## 2018-03-16 NOTE — ASSESSMENT & PLAN NOTE
Patient's LDL is 157 but his HDL is 85  Patient is not interested in taking medication  Recheck in 6 months after risk factor modification

## 2018-04-06 RX ORDER — PAROXETINE HYDROCHLORIDE 40 MG/1
50 TABLET, FILM COATED ORAL DAILY
Refills: 3 | COMMUNITY
Start: 2018-03-22 | End: 2021-10-28

## 2018-04-09 ENCOUNTER — OFFICE VISIT (OUTPATIENT)
Dept: FAMILY MEDICINE CLINIC | Facility: CLINIC | Age: 63
End: 2018-04-09
Payer: COMMERCIAL

## 2018-04-09 VITALS
BODY MASS INDEX: 20.33 KG/M2 | DIASTOLIC BLOOD PRESSURE: 82 MMHG | HEIGHT: 70 IN | WEIGHT: 142 LBS | SYSTOLIC BLOOD PRESSURE: 118 MMHG

## 2018-04-09 DIAGNOSIS — M54.2 CHRONIC NECK PAIN: ICD-10-CM

## 2018-04-09 DIAGNOSIS — G89.29 CHRONIC MIDLINE THORACIC BACK PAIN: ICD-10-CM

## 2018-04-09 DIAGNOSIS — M54.50 CHRONIC MIDLINE LOW BACK PAIN WITHOUT SCIATICA: Primary | ICD-10-CM

## 2018-04-09 DIAGNOSIS — G89.29 CHRONIC NECK PAIN: ICD-10-CM

## 2018-04-09 DIAGNOSIS — M54.6 CHRONIC MIDLINE THORACIC BACK PAIN: ICD-10-CM

## 2018-04-09 DIAGNOSIS — J43.9 PULMONARY EMPHYSEMA, UNSPECIFIED EMPHYSEMA TYPE (HCC): ICD-10-CM

## 2018-04-09 DIAGNOSIS — G89.29 CHRONIC MIDLINE LOW BACK PAIN WITHOUT SCIATICA: Primary | ICD-10-CM

## 2018-04-09 DIAGNOSIS — E78.00 PURE HYPERCHOLESTEROLEMIA: ICD-10-CM

## 2018-04-09 PROCEDURE — 3008F BODY MASS INDEX DOCD: CPT | Performed by: FAMILY MEDICINE

## 2018-04-09 PROCEDURE — 99214 OFFICE O/P EST MOD 30 MIN: CPT | Performed by: FAMILY MEDICINE

## 2018-04-10 NOTE — PROGRESS NOTES
Assessment/Plan:    Pulmonary emphysema ([address] )  Patient doing very well on Spiriva  Does not want a rescue inhaler  Recheck in 6 months    Chronic midline thoracic back pain  Patient's x-rays reviewed  Please see low back pain comment    Chronic neck pain  X-rays reviewed-see low back pain comment    Chronic midline low back pain without sciatica  X-rays reviewed  Patient has a history of herniated disc going back to the 1990s  Patient has worsened with physical therapy  At this point recommend MRI of the lumbar spine  The neck and thoracic region are fairly stable at this point  Patient can increase his gabapentin up to 300mg  3 times a day  Pending his MRI will refer to Pain Management and/or Neurosurgery  Diagnoses and all orders for this visit:    Chronic midline low back pain without sciatica  -     MRI lumbar spine wo contrast; Future    Chronic midline thoracic back pain    Chronic neck pain    Pulmonary emphysema, unspecified emphysema type ([address] )    Pure hypercholesterolemia    Other orders  -     PARoxetine (PAXIL) 40 MG tablet; Take 40 mg by mouth daily        There are no Patient Instructions on file for this visit  Subjective:        Patient ID: Emiliano Collazo  is a 57 y o  male  Chief Complaint   Patient presents with    Follow-up     F/U back pain       49-year-old white male with history of emphysema here for recheck of back pain  Had x-rays of the neck mid spine and lumbar spine  Had to stop physical therapy because it worsens his low back  States his inhaler helps  The following portions of the patient's history were reviewed and updated as appropriate: past medical history, past surgical history and problem list       Review of Systems   Constitutional: Negative for appetite change, fatigue, fever and unexpected weight change  HENT: Negative for congestion, ear pain, postnasal drip, rhinorrhea, sinus pain, sinus pressure and sore throat      Eyes: Negative for redness and visual disturbance  Respiratory: Negative for chest tightness and shortness of breath  Cardiovascular: Negative for chest pain, palpitations and leg swelling  Gastrointestinal: Negative for abdominal distention, abdominal pain, diarrhea and nausea  Endocrine: Negative for cold intolerance and heat intolerance  Genitourinary: Negative for dysuria and hematuria  Musculoskeletal: Positive for back pain  Negative for arthralgias, gait problem and myalgias  Skin: Negative for pallor and rash  Neurological: Negative for dizziness and headaches  Psychiatric/Behavioral: Negative for behavioral problems  The patient is not nervous/anxious  Objective:  /82 (BP Location: Left arm, Patient Position: Sitting, Cuff Size: Standard)   Ht 5' 10" (1 778 m)   Wt 64 4 kg (142 lb)   BMI 20 37 kg/m²        Physical Exam   Constitutional: He is oriented to person, place, and time  He appears well-developed and well-nourished  No distress  HENT:   Head: Normocephalic and atraumatic  Right Ear: External ear normal    Left Ear: External ear normal    Mouth/Throat: Oropharynx is clear and moist    Eyes: Conjunctivae and EOM are normal  Pupils are equal, round, and reactive to light  No scleral icterus  Neck: Normal range of motion  Neck supple  No thyromegaly present  Cardiovascular: Normal rate, regular rhythm and intact distal pulses  No murmur heard  Pulmonary/Chest: Effort normal and breath sounds normal  He has no wheezes  Abdominal: Soft  Bowel sounds are normal  He exhibits no distension  There is no tenderness  Musculoskeletal: He exhibits no edema  Decreased range of motion with lumbar flexion  Positive paravertebral muscular spasm in the lumbar region greater than the thoracic region  Lymphadenopathy:     He has no cervical adenopathy  Neurological: He is alert and oriented to person, place, and time  He displays normal reflexes   Coordination normal    Skin: Skin is warm  No pallor  Psychiatric: He has a normal mood and affect   His behavior is normal  Thought content normal

## 2018-04-10 NOTE — ASSESSMENT & PLAN NOTE
X-rays reviewed  Patient has a history of herniated disc going back to the 1990s  Patient has worsened with physical therapy  At this point recommend MRI of the lumbar spine  The neck and thoracic region are fairly stable at this point  Patient can increase his gabapentin up to 300mg  3 times a day  Pending his MRI will refer to Pain Management and/or Neurosurgery

## 2018-04-19 ENCOUNTER — HOSPITAL ENCOUNTER (OUTPATIENT)
Dept: MRI IMAGING | Facility: HOSPITAL | Age: 63
Discharge: HOME | End: 2018-04-19
Payer: COMMERCIAL

## 2018-04-19 DIAGNOSIS — G89.29 CHRONIC MIDLINE LOW BACK PAIN WITHOUT SCIATICA: ICD-10-CM

## 2018-04-19 DIAGNOSIS — M54.50 CHRONIC MIDLINE LOW BACK PAIN WITHOUT SCIATICA: ICD-10-CM

## 2018-04-19 PROCEDURE — 72148 MRI LUMBAR SPINE W/O DYE: CPT

## 2018-04-27 ENCOUNTER — TELEPHONE (OUTPATIENT)
Dept: FAMILY MEDICINE CLINIC | Facility: CLINIC | Age: 63
End: 2018-04-27

## 2018-04-27 NOTE — TELEPHONE ENCOUNTER
Patient called and stated he had a MRI of the lumbar spine done last week, asking for a call back with the results

## 2018-04-27 NOTE — TELEPHONE ENCOUNTER
Tell patient is MRI actually looked fairly normal except for 1 area that had a minor disc protrusion which was not causing any neurological issues

## 2021-04-12 ENCOUNTER — TELEPHONE (OUTPATIENT)
Dept: FAMILY MEDICINE CLINIC | Facility: CLINIC | Age: 66
End: 2021-04-12

## 2021-04-12 NOTE — TELEPHONE ENCOUNTER
Please inform the patient that pain in the chest 8/10 I would recommend highly emergency room evaluation as he may need certain scans and lab work which we cannot do in the office  Would recommend [facility]

## 2021-04-12 NOTE — TELEPHONE ENCOUNTER
Patient called today asking if we can put him in the schedule with you ASAP  He is having pain when he breathes in and out  Patient states it is sharp and right in his chest  Patient only would like to see you  Please advise  Thank you

## 2021-04-12 NOTE — TELEPHONE ENCOUNTER
Can we please get more info as went to the pain start I e  how long has he had or is this something that just started today    On a scale of 1-10 how strong is the pain

## 2021-04-12 NOTE — TELEPHONE ENCOUNTER
Spoke w/ pt and he states that the pain started yesterday around supper time and on a pain scale the pain is an 8   Pt states he does hurt when he breathes in and out

## 2021-09-27 ENCOUNTER — TELEPHONE (OUTPATIENT)
Dept: FAMILY MEDICINE CLINIC | Facility: CLINIC | Age: 66
End: 2021-09-27

## 2021-09-27 DIAGNOSIS — J43.9 PULMONARY EMPHYSEMA, UNSPECIFIED EMPHYSEMA TYPE (HCC): Primary | ICD-10-CM

## 2021-09-27 RX ORDER — ALBUTEROL SULFATE 90 UG/1
2 AEROSOL, METERED RESPIRATORY (INHALATION) EVERY 6 HOURS PRN
Qty: 8.5 G | Refills: 1 | Status: SHIPPED | OUTPATIENT
Start: 2021-09-27

## 2021-09-27 NOTE — TELEPHONE ENCOUNTER
Let patient know inhaler has been sent to pharmacy    Let him know he is also 3 years overdue I believe so he should have some sort of appointment in November December or January with me for 2 slots

## 2021-09-27 NOTE — TELEPHONE ENCOUNTER
Patient called today - he is requesting a provair inhaler  He said he has spoken to Dr Karli Lockwood and used sample inhalers and was told he needs to be put on a daily dose  Please advise  He uses the [facility] pharmacy in [place]

## 2021-09-30 ENCOUNTER — TELEPHONE (OUTPATIENT)
Dept: FAMILY MEDICINE CLINIC | Facility: CLINIC | Age: 66
End: 2021-09-30

## 2021-09-30 NOTE — TELEPHONE ENCOUNTER
Patient called stating the Abluterol Inhaler is not working for him, seems like he is slipping backward instead of forward and gives him a headache  Would you please send some other inhaler for him      Patient uses [facility] [address] in [facility]

## 2021-09-30 NOTE — TELEPHONE ENCOUNTER
Patient has not been seen in over 3 years  Really needs to make an appointment likely with Trell within the next 2 weeks to go over this    She might need 2 slots

## 2021-10-01 NOTE — TELEPHONE ENCOUNTER
Spoke with patient and gave him the information and got him scheduled with Dr Tru Kang for Monday, October 4th

## 2021-10-04 ENCOUNTER — OFFICE VISIT (OUTPATIENT)
Dept: FAMILY MEDICINE CLINIC | Facility: CLINIC | Age: 66
End: 2021-10-04
Payer: COMMERCIAL

## 2021-10-04 ENCOUNTER — TELEPHONE (OUTPATIENT)
Dept: GASTROENTEROLOGY | Facility: CLINIC | Age: 66
End: 2021-10-04

## 2021-10-04 VITALS
BODY MASS INDEX: 18.27 KG/M2 | HEIGHT: 70 IN | OXYGEN SATURATION: 96 % | DIASTOLIC BLOOD PRESSURE: 82 MMHG | WEIGHT: 127.6 LBS | HEART RATE: 86 BPM | RESPIRATION RATE: 16 BRPM | TEMPERATURE: 97.7 F | SYSTOLIC BLOOD PRESSURE: 110 MMHG

## 2021-10-04 DIAGNOSIS — E78.00 PURE HYPERCHOLESTEROLEMIA: ICD-10-CM

## 2021-10-04 DIAGNOSIS — J43.9 PULMONARY EMPHYSEMA, UNSPECIFIED EMPHYSEMA TYPE (HCC): Primary | ICD-10-CM

## 2021-10-04 DIAGNOSIS — Z12.5 PROSTATE CANCER SCREENING: ICD-10-CM

## 2021-10-04 DIAGNOSIS — Z12.11 COLON CANCER SCREENING: ICD-10-CM

## 2021-10-04 DIAGNOSIS — R91.1 PULMONARY NODULE SEEN ON IMAGING STUDY: ICD-10-CM

## 2021-10-04 PROCEDURE — 1160F RVW MEDS BY RX/DR IN RCRD: CPT | Performed by: FAMILY MEDICINE

## 2021-10-04 PROCEDURE — 1036F TOBACCO NON-USER: CPT | Performed by: FAMILY MEDICINE

## 2021-10-04 PROCEDURE — 3008F BODY MASS INDEX DOCD: CPT | Performed by: FAMILY MEDICINE

## 2021-10-04 PROCEDURE — 3288F FALL RISK ASSESSMENT DOCD: CPT | Performed by: FAMILY MEDICINE

## 2021-10-04 PROCEDURE — 99214 OFFICE O/P EST MOD 30 MIN: CPT | Performed by: FAMILY MEDICINE

## 2021-10-04 PROCEDURE — 1101F PT FALLS ASSESS-DOCD LE1/YR: CPT | Performed by: FAMILY MEDICINE

## 2021-10-04 RX ORDER — TIOTROPIUM BROMIDE INHALATION SPRAY 3.12 UG/1
2 SPRAY, METERED RESPIRATORY (INHALATION) DAILY
COMMUNITY
End: 2021-10-29 | Stop reason: SDUPTHER

## 2021-10-04 RX ORDER — PAROXETINE HYDROCHLORIDE 25 MG/1
50 TABLET, FILM COATED, EXTENDED RELEASE ORAL EVERY MORNING
COMMUNITY
Start: 2021-09-16

## 2021-10-05 ENCOUNTER — PREP FOR PROCEDURE (OUTPATIENT)
Dept: GASTROENTEROLOGY | Facility: CLINIC | Age: 66
End: 2021-10-05

## 2021-10-05 DIAGNOSIS — Z86.010 HISTORY OF COLON POLYPS: Primary | ICD-10-CM

## 2021-10-12 ENCOUNTER — TELEPHONE (OUTPATIENT)
Dept: GASTROENTEROLOGY | Facility: CLINIC | Age: 66
End: 2021-10-12

## 2021-10-13 ENCOUNTER — APPOINTMENT (OUTPATIENT)
Dept: URGENT CARE | Facility: MEDICAL CENTER | Age: 66
End: 2021-10-13

## 2021-10-13 DIAGNOSIS — Z02.1 PRE-EMPLOYMENT EXAMINATION: Primary | ICD-10-CM

## 2021-10-13 PROCEDURE — U0003 INFECTIOUS AGENT DETECTION BY NUCLEIC ACID (DNA OR RNA); SEVERE ACUTE RESPIRATORY SYNDROME CORONAVIRUS 2 (SARS-COV-2) (CORONAVIRUS DISEASE [COVID-19]), AMPLIFIED PROBE TECHNIQUE, MAKING USE OF HIGH THROUGHPUT TECHNOLOGIES AS DESCRIBED BY CMS-2020-01-R: HCPCS | Performed by: PHYSICIAN ASSISTANT

## 2021-10-13 PROCEDURE — U0005 INFEC AGEN DETEC AMPLI PROBE: HCPCS | Performed by: PHYSICIAN ASSISTANT

## 2021-10-14 LAB — SARS-COV-2 RNA RESP QL NAA+PROBE: NEGATIVE

## 2021-10-15 ENCOUNTER — HOSPITAL ENCOUNTER (OUTPATIENT)
Dept: CT IMAGING | Facility: HOSPITAL | Age: 66
Discharge: HOME | End: 2021-10-15
Payer: COMMERCIAL

## 2021-10-15 DIAGNOSIS — R91.1 PULMONARY NODULE SEEN ON IMAGING STUDY: ICD-10-CM

## 2021-10-15 DIAGNOSIS — J43.9 PULMONARY EMPHYSEMA, UNSPECIFIED EMPHYSEMA TYPE (HCC): ICD-10-CM

## 2021-10-15 PROCEDURE — G1004 CDSM NDSC: HCPCS

## 2021-10-15 PROCEDURE — 71250 CT THORAX DX C-: CPT

## 2021-10-28 ENCOUNTER — TELEPHONE (OUTPATIENT)
Dept: FAMILY MEDICINE CLINIC | Facility: CLINIC | Age: 66
End: 2021-10-28

## 2021-10-29 DIAGNOSIS — J43.9 PULMONARY EMPHYSEMA, UNSPECIFIED EMPHYSEMA TYPE (HCC): Primary | ICD-10-CM

## 2021-10-29 RX ORDER — TIOTROPIUM BROMIDE INHALATION SPRAY 3.12 UG/1
2 SPRAY, METERED RESPIRATORY (INHALATION) DAILY
Qty: 4 G | Refills: 2 | Status: SHIPPED | OUTPATIENT
Start: 2021-10-29 | End: 2022-02-08 | Stop reason: SDUPTHER

## 2021-11-26 LAB
ALBUMIN SERPL-MCNC: 4.9 G/DL (ref 3.8–4.8)
ALBUMIN/GLOB SERPL: 2.3 {RATIO} (ref 1.2–2.2)
ALP SERPL-CCNC: 120 IU/L (ref 44–121)
ALT SERPL-CCNC: 13 IU/L (ref 0–44)
AST SERPL-CCNC: 20 IU/L (ref 0–40)
BASOPHILS # BLD AUTO: 0.1 X10E3/UL (ref 0–0.2)
BASOPHILS NFR BLD AUTO: 1 %
BILIRUB SERPL-MCNC: 0.2 MG/DL (ref 0–1.2)
BUN SERPL-MCNC: 16 MG/DL (ref 8–27)
BUN/CREAT SERPL: 18 (ref 10–24)
CALCIUM SERPL-MCNC: 10 MG/DL (ref 8.6–10.2)
CHLORIDE SERPL-SCNC: 101 MMOL/L (ref 96–106)
CHOLEST SERPL-MCNC: 226 MG/DL (ref 100–199)
CO2 SERPL-SCNC: 28 MMOL/L (ref 20–29)
CREAT SERPL-MCNC: 0.9 MG/DL (ref 0.76–1.27)
EOSINOPHIL # BLD AUTO: 0.3 X10E3/UL (ref 0–0.4)
EOSINOPHIL NFR BLD AUTO: 4 %
ERYTHROCYTE [DISTWIDTH] IN BLOOD BY AUTOMATED COUNT: 12.5 % (ref 11.6–15.4)
GLOBULIN SER-MCNC: 2.1 G/DL (ref 1.5–4.5)
GLUCOSE SERPL-MCNC: 97 MG/DL (ref 65–99)
HCT VFR BLD AUTO: 47.1 % (ref 37.5–51)
HDLC SERPL-MCNC: 85 MG/DL
HGB BLD-MCNC: 16.4 G/DL (ref 13–17.7)
IMM GRANULOCYTES # BLD: 0 X10E3/UL (ref 0–0.1)
IMM GRANULOCYTES NFR BLD: 0 %
LDLC SERPL CALC-MCNC: 130 MG/DL (ref 0–99)
LYMPHOCYTES # BLD AUTO: 1.7 X10E3/UL (ref 0.7–3.1)
LYMPHOCYTES NFR BLD AUTO: 26 %
MCH RBC QN AUTO: 31.1 PG (ref 26.6–33)
MCHC RBC AUTO-ENTMCNC: 34.8 G/DL (ref 31.5–35.7)
MCV RBC AUTO: 89 FL (ref 79–97)
MONOCYTES # BLD AUTO: 0.6 X10E3/UL (ref 0.1–0.9)
MONOCYTES NFR BLD AUTO: 9 %
NEUTROPHILS # BLD AUTO: 4 X10E3/UL (ref 1.4–7)
NEUTROPHILS NFR BLD AUTO: 60 %
PLATELET # BLD AUTO: 274 X10E3/UL (ref 150–450)
POTASSIUM SERPL-SCNC: 5.6 MMOL/L (ref 3.5–5.2)
PROT SERPL-MCNC: 7 G/DL (ref 6–8.5)
PSA SERPL-MCNC: 1.1 NG/ML (ref 0–4)
RBC # BLD AUTO: 5.28 X10E6/UL (ref 4.14–5.8)
SL AMB EGFR AFRICAN AMERICAN: 101 ML/MIN/1.73
SL AMB EGFR NON AFRICAN AMERICAN: 87 ML/MIN/1.73
SL AMB VLDL CHOLESTEROL CALC: 11 MG/DL (ref 5–40)
SODIUM SERPL-SCNC: 143 MMOL/L (ref 134–144)
TRIGL SERPL-MCNC: 66 MG/DL (ref 0–149)
WBC # BLD AUTO: 6.7 X10E3/UL (ref 3.4–10.8)

## 2021-12-08 ENCOUNTER — TELEPHONE (OUTPATIENT)
Dept: GASTROENTEROLOGY | Facility: MEDICAL CENTER | Age: 66
End: 2021-12-08

## 2021-12-10 ENCOUNTER — TELEPHONE (OUTPATIENT)
Dept: GASTROENTEROLOGY | Facility: MEDICAL CENTER | Age: 66
End: 2021-12-10

## 2022-02-08 DIAGNOSIS — J43.9 PULMONARY EMPHYSEMA, UNSPECIFIED EMPHYSEMA TYPE (HCC): ICD-10-CM

## 2022-02-08 RX ORDER — TIOTROPIUM BROMIDE INHALATION SPRAY 3.12 UG/1
2 SPRAY, METERED RESPIRATORY (INHALATION) DAILY
Qty: 4 G | Refills: 2 | Status: SHIPPED | OUTPATIENT
Start: 2022-02-08 | End: 2022-02-23 | Stop reason: SDUPTHER

## 2022-02-23 DIAGNOSIS — J43.9 PULMONARY EMPHYSEMA, UNSPECIFIED EMPHYSEMA TYPE (HCC): ICD-10-CM

## 2022-02-23 RX ORDER — TIOTROPIUM BROMIDE INHALATION SPRAY 3.12 UG/1
2 SPRAY, METERED RESPIRATORY (INHALATION) DAILY
Qty: 4 G | Refills: 2 | Status: SHIPPED | OUTPATIENT
Start: 2022-02-23 | End: 2022-03-09

## 2022-03-09 DIAGNOSIS — J43.9 PULMONARY EMPHYSEMA, UNSPECIFIED EMPHYSEMA TYPE (HCC): ICD-10-CM

## 2022-03-09 RX ORDER — TIOTROPIUM BROMIDE INHALATION SPRAY 3.12 UG/1
SPRAY, METERED RESPIRATORY (INHALATION)
Qty: 4 G | Refills: 2 | Status: SHIPPED | OUTPATIENT
Start: 2022-03-09 | End: 2022-03-17 | Stop reason: SDUPTHER

## 2022-03-10 ENCOUNTER — TELEPHONE (OUTPATIENT)
Dept: FAMILY MEDICINE CLINIC | Facility: CLINIC | Age: 67
End: 2022-03-10

## 2022-03-11 ENCOUNTER — APPOINTMENT (OUTPATIENT)
Dept: PHYSICAL THERAPY | Facility: MEDICAL CENTER | Age: 67
End: 2022-03-11

## 2022-03-11 PROCEDURE — 97530 THERAPEUTIC ACTIVITIES: CPT | Performed by: PHYSICAL MEDICINE & REHABILITATION

## 2022-03-11 NOTE — TELEPHONE ENCOUNTER
The only other cheaper option is a nebulizer using ipratropium vials---do not know if he has a nebulizer at home historically?

## 2022-03-11 NOTE — TELEPHONE ENCOUNTER
Unfortunately we have no more samples that we get in the office  Is at the [facility]ir[facility] or the Spiriva with the capsules?

## 2022-03-11 NOTE — TELEPHONE ENCOUNTER
Patient states he does not have a nebulizer at home but he will contact Spir[facility]  to see if they have any options to help patients

## 2022-03-17 DIAGNOSIS — J43.9 PULMONARY EMPHYSEMA, UNSPECIFIED EMPHYSEMA TYPE (HCC): ICD-10-CM

## 2022-03-17 RX ORDER — TIOTROPIUM BROMIDE INHALATION SPRAY 3.12 UG/1
2 SPRAY, METERED RESPIRATORY (INHALATION) DAILY
Qty: 4 G | Refills: 2 | Status: SHIPPED | OUTPATIENT
Start: 2022-03-17 | End: 2022-07-05 | Stop reason: SDUPTHER

## 2022-04-25 ENCOUNTER — TELEPHONE (OUTPATIENT)
Dept: GASTROENTEROLOGY | Facility: CLINIC | Age: 67
End: 2022-04-25

## 2022-04-25 NOTE — TELEPHONE ENCOUNTER
Patients GI provider:  Dr Gabriel Johnson    Number to return call: [phone]    Reason for call: Pt calling to cancel his procedure  Will call back when he is ready to schedule       Scheduled procedure/appointment date if applicable: procedure 2/64/51

## 2022-04-26 NOTE — ASSESSMENT & PLAN NOTE
Patient doing very well on Spiriva  Does not want a rescue inhaler    Recheck in 6 months Primary Defect Width (In Cm): 0

## 2022-07-05 DIAGNOSIS — J43.9 PULMONARY EMPHYSEMA, UNSPECIFIED EMPHYSEMA TYPE (HCC): ICD-10-CM

## 2022-07-05 RX ORDER — TIOTROPIUM BROMIDE INHALATION SPRAY 3.12 UG/1
2 SPRAY, METERED RESPIRATORY (INHALATION) DAILY
Qty: 4 G | Refills: 2 | Status: SHIPPED | OUTPATIENT
Start: 2022-07-05

## 2023-06-05 ENCOUNTER — OFFICE VISIT (OUTPATIENT)
Dept: FAMILY MEDICINE CLINIC | Facility: CLINIC | Age: 68
End: 2023-06-05
Payer: MEDICARE

## 2023-06-05 VITALS — DIASTOLIC BLOOD PRESSURE: 97 MMHG | SYSTOLIC BLOOD PRESSURE: 145 MMHG

## 2023-06-05 DIAGNOSIS — J43.9 PULMONARY EMPHYSEMA, UNSPECIFIED EMPHYSEMA TYPE (HCC): Primary | ICD-10-CM

## 2023-06-05 DIAGNOSIS — E78.00 PURE HYPERCHOLESTEROLEMIA: ICD-10-CM

## 2023-06-05 DIAGNOSIS — F41.1 GENERALIZED ANXIETY DISORDER: ICD-10-CM

## 2023-06-05 DIAGNOSIS — R91.1 PULMONARY NODULE SEEN ON IMAGING STUDY: ICD-10-CM

## 2023-06-05 DIAGNOSIS — R06.02 SOB (SHORTNESS OF BREATH): ICD-10-CM

## 2023-06-05 DIAGNOSIS — M48.02 FORAMINAL STENOSIS OF CERVICAL REGION: ICD-10-CM

## 2023-06-05 DIAGNOSIS — Z12.5 PROSTATE CANCER SCREENING: ICD-10-CM

## 2023-06-05 DIAGNOSIS — F31.4 BIPOLAR AFFECTIVE DISORDER, DEPRESSED, SEVERE (HCC): ICD-10-CM

## 2023-06-05 PROCEDURE — 99214 OFFICE O/P EST MOD 30 MIN: CPT | Performed by: FAMILY MEDICINE

## 2023-06-05 RX ORDER — ALBUTEROL SULFATE 90 UG/1
2 AEROSOL, METERED RESPIRATORY (INHALATION) EVERY 6 HOURS PRN
Qty: 18 G | Refills: 5 | Status: SHIPPED | OUTPATIENT
Start: 2023-06-05

## 2023-06-05 RX ORDER — TRAMADOL HYDROCHLORIDE 50 MG/1
TABLET ORAL
Qty: 60 TABLET | Refills: 0
Start: 2023-06-05

## 2023-06-08 NOTE — PROGRESS NOTES
Assessment/Plan: Patient is symptomatic with dyspnea on exertion  Has history of COPD  Cannot afford inhalers  Sample of Breztri x 2  Prescriptions for Spiriva and albuterol given but he cannot fill these until June 21 until he gets his paycheck  States overall mood is stable  No longer on any psychiatric meds  To better assess him I would recommend ambulatory referral to pulmonology for likely pulmonary function testing and further evaluation of COPD  Recommend update CT of the chest regarding COPD as well as history of pulmonary nodule  Due for labs  Ordered  Recheck 1 month  ER if worse at any time  Separately small prescription for tramadol to be used only as needed  Narcotic contract signed  Guidelines reviewed  If continues this more than not will need to be seen every 3 months for opioid reevaluation  No problem-specific Assessment & Plan notes found for this encounter  Diagnoses and all orders for this visit:    Pulmonary emphysema, unspecified emphysema type ([facility] )  -     CT chest wo contrast; Future  -     Ambulatory Referral to Pulmonology; Future    SOB (shortness of breath)  -     CT chest wo contrast; Future    Pulmonary nodule seen on imaging study  -     CT chest wo contrast; Future  -     umeclidinium-vilanterol 62 5-25 mcg/actuation inhaler; Inhale 1 puff daily  -     albuterol (Ventolin HFA) 90 mcg/act inhaler; Inhale 2 puffs every 6 (six) hours as needed for wheezing  -     CBC and differential; Future    Bipolar affective disorder, depressed, severe ([facility] )    Pure hypercholesterolemia  -     Comprehensive metabolic panel; Future  -     Lipid panel; Future  -     TSH, 3rd generation; Future    Foraminal stenosis of cervical region  -     traMADol (Ultram) 50 mg tablet; Take 1 tab twice a day as needed for severe pain    Generalized anxiety disorder    Prostate cancer screening  -     PSA, Total Screen; Future        1   Pulmonary emphysema, unspecified emphysema type ([facility] ) CT chest wo contrast    Ambulatory Referral to Pulmonology      2  SOB (shortness of breath)  CT chest wo contrast      3  Pulmonary nodule seen on imaging study  CT chest wo contrast    umeclidinium-vilanterol 62 5-25 mcg/actuation inhaler    albuterol (Ventolin HFA) 90 mcg/act inhaler    CBC and differential      4  Bipolar affective disorder, depressed, severe ([address] )        5  Pure hypercholesterolemia  Comprehensive metabolic panel    Lipid panel    TSH, 3rd generation      6  Foraminal stenosis of cervical region  traMADol (Ultram) 50 mg tablet      7  Generalized anxiety disorder        8  Prostate cancer screening  PSA, Total Screen          Subjective:        Patient ID: Vincenzo Barajas  is a 69 y o  male  Chief Complaint   Patient presents with   • COPD     Patient states he has difficulty breathing    • Back Pain     7/10 pain scale   • Hip Pain     Patient states he has pain in both hips        Patient here for evaluation of COPD and chronic neck pain  States he is having a lot of shortness of breath and dyspnea on exertion  Out of inhalers  Has tried over-the-counter anti-inflammatories for pain which does not help  Has chronic neck issues  The following portions of the patient's history were reviewed and updated as appropriate: past medical history, past surgical history and problem list       Review of Systems   Constitutional: Negative for appetite change, fatigue, fever and unexpected weight change  HENT: Negative for congestion, ear pain, postnasal drip, rhinorrhea, sinus pressure, sinus pain and sore throat  Eyes: Negative for redness and visual disturbance  Respiratory: Positive for shortness of breath  Negative for chest tightness  Cardiovascular: Negative for chest pain, palpitations and leg swelling  Gastrointestinal: Negative for abdominal distention, abdominal pain, diarrhea and nausea  Endocrine: Negative for cold intolerance and heat intolerance  Genitourinary: Negative for dysuria and hematuria  Musculoskeletal: Positive for neck pain and neck stiffness  Negative for arthralgias, gait problem and myalgias  Skin: Negative for pallor and rash  Neurological: Negative for dizziness, tremors, weakness, light-headedness and headaches  Psychiatric/Behavioral: Negative for behavioral problems  The patient is not nervous/anxious  Objective:  /97 (BP Location: Right arm, Patient Position: Sitting)        Physical Exam  Vitals and nursing note reviewed  Constitutional:       General: He is not in acute distress  Appearance: He is not diaphoretic  HENT:      Head: Normocephalic and atraumatic  Eyes:      General: No scleral icterus  Conjunctiva/sclera: Conjunctivae normal       Pupils: Pupils are equal, round, and reactive to light  Neck:      Thyroid: No thyromegaly  Cardiovascular:      Rate and Rhythm: Normal rate and regular rhythm  Pulses:           Carotid pulses are 0 on the right side and 0 on the left side  Heart sounds: Normal heart sounds  No murmur heard  Pulmonary:      Effort: Pulmonary effort is normal  No respiratory distress  Breath sounds: No wheezing  Comments: Decreased breath sounds but clear  Abdominal:      General: There is no distension  Palpations: Abdomen is soft  Musculoskeletal:      Cervical back: Normal range of motion and neck supple  Right lower leg: No edema  Left lower leg: No edema  Lymphadenopathy:      Cervical: No cervical adenopathy  Skin:     General: Skin is warm  Coloration: Skin is not pale  Neurological:      General: No focal deficit present  Mental Status: He is alert and oriented to person, place, and time  Cranial Nerves: No cranial nerve deficit  Deep Tendon Reflexes: Reflexes are normal and symmetric     Psychiatric:         Mood and Affect: Mood normal          Behavior: Behavior normal          Thought Content: Thought content normal          Judgment: Judgment normal

## 2023-06-20 DIAGNOSIS — M48.02 FORAMINAL STENOSIS OF CERVICAL REGION: ICD-10-CM

## 2023-06-20 RX ORDER — TRAMADOL HYDROCHLORIDE 50 MG/1
TABLET ORAL
Qty: 60 TABLET | Refills: 0 | Status: SHIPPED | OUTPATIENT
Start: 2023-06-20

## 2023-06-22 ENCOUNTER — HOSPITAL ENCOUNTER (OUTPATIENT)
Dept: CT IMAGING | Facility: HOSPITAL | Age: 68
Discharge: HOME | End: 2023-06-22
Payer: MEDICARE

## 2023-06-22 DIAGNOSIS — R91.1 PULMONARY NODULE SEEN ON IMAGING STUDY: ICD-10-CM

## 2023-06-22 DIAGNOSIS — R06.02 SOB (SHORTNESS OF BREATH): ICD-10-CM

## 2023-06-22 DIAGNOSIS — J43.9 PULMONARY EMPHYSEMA, UNSPECIFIED EMPHYSEMA TYPE (HCC): ICD-10-CM

## 2023-06-22 PROCEDURE — 71250 CT THORAX DX C-: CPT

## 2023-06-22 PROCEDURE — G1004 CDSM NDSC: HCPCS

## 2023-06-26 ENCOUNTER — TELEPHONE (OUTPATIENT)
Dept: OTHER | Facility: OTHER | Age: 68
End: 2023-06-26

## 2023-06-27 ENCOUNTER — RA CDI HCC (OUTPATIENT)
Dept: OTHER | Facility: HOSPITAL | Age: 68
End: 2023-06-27

## 2023-06-27 DIAGNOSIS — E87.5 HYPERKALEMIA: Primary | ICD-10-CM

## 2023-06-27 NOTE — PROGRESS NOTES
[address]  coding opportunities       Chart reviewed, no opportunity found: CHART REVIEWED, NO OPPORTUNITY FOUND        Patients Insurance     Medicare Insurance: Medicare

## 2023-06-27 NOTE — TELEPHONE ENCOUNTER
Lab Result: POTASIUM 6 1   Date/Time Drawn: 06/26 @6608    Ordering Provider: Dr Sulma Lazcano Name: Cyndra Goltz / Dana Mg        The following critical/stat result was read back to the lab as stated above and [employer] to the on-call provider  The provider confirmed receipt of the message

## 2023-07-05 ENCOUNTER — OFFICE VISIT (OUTPATIENT)
Dept: FAMILY MEDICINE CLINIC | Facility: CLINIC | Age: 68
End: 2023-07-05
Payer: MEDICARE

## 2023-07-05 VITALS
DIASTOLIC BLOOD PRESSURE: 87 MMHG | WEIGHT: 127 LBS | OXYGEN SATURATION: 95 % | HEART RATE: 80 BPM | SYSTOLIC BLOOD PRESSURE: 136 MMHG | BODY MASS INDEX: 18.18 KG/M2 | HEIGHT: 70 IN

## 2023-07-05 DIAGNOSIS — G89.29 CHRONIC MIDLINE LOW BACK PAIN WITHOUT SCIATICA: ICD-10-CM

## 2023-07-05 DIAGNOSIS — M54.2 CHRONIC NECK PAIN: ICD-10-CM

## 2023-07-05 DIAGNOSIS — G89.29 CHRONIC NECK PAIN: ICD-10-CM

## 2023-07-05 DIAGNOSIS — M48.02 FORAMINAL STENOSIS OF CERVICAL REGION: ICD-10-CM

## 2023-07-05 DIAGNOSIS — M54.50 CHRONIC MIDLINE LOW BACK PAIN WITHOUT SCIATICA: ICD-10-CM

## 2023-07-05 DIAGNOSIS — J43.9 PULMONARY EMPHYSEMA, UNSPECIFIED EMPHYSEMA TYPE (HCC): Primary | ICD-10-CM

## 2023-07-05 DIAGNOSIS — Z23 ENCOUNTER FOR IMMUNIZATION: ICD-10-CM

## 2023-07-05 DIAGNOSIS — E44.1 MILD PROTEIN-CALORIE MALNUTRITION (HCC): ICD-10-CM

## 2023-07-05 DIAGNOSIS — M25.552 BILATERAL HIP PAIN: ICD-10-CM

## 2023-07-05 DIAGNOSIS — M25.551 BILATERAL HIP PAIN: ICD-10-CM

## 2023-07-05 PROCEDURE — G0009 ADMIN PNEUMOCOCCAL VACCINE: HCPCS

## 2023-07-05 PROCEDURE — 90677 PCV20 VACCINE IM: CPT

## 2023-07-05 PROCEDURE — 99214 OFFICE O/P EST MOD 30 MIN: CPT | Performed by: FAMILY MEDICINE

## 2023-07-05 RX ORDER — GABAPENTIN 100 MG/1
CAPSULE ORAL
Qty: 60 CAPSULE | Refills: 2 | Status: SHIPPED | OUTPATIENT
Start: 2023-07-05

## 2023-07-05 NOTE — PROGRESS NOTES
Assessment/Plan   Emphysema somewhat better. Unfortunately studies are showing definite emphysema. Continue current inhalers. Refer to pulmonology. Likely will get pulmonary function testing and further aggressive treatment. With regards to pain, reviewed with patient previous MRI of the lumbar spine as well as x-rays of the lumbar spine thoracic spine and cervical spine findings are not overly significant except in part to the lumbar spine area. Rec x-ray now that he is complaining of this an MRI of the cervical spine. Will refer to orthopedics at his further evaluation guards to pain. Consider for understands that we will not just continue to write chronic for pain management. Discussed the usage of gabapentin and titration. Recheck every 3 months. Problem List Items Addressed This Visit        Other    Mild protein-calorie malnutrition ([address])   Other Visit Diagnoses     Encounter for immunization    -  Primary    Relevant Orders    Pneumococcal Conjugate Vaccine 20-valent (PCV20)    Bilateral hip pain        Relevant Orders    XR hip/pelv 2-3 vws left if performed    XR hip/pelv 2-3 vws right if performed         Treatment Plan: To improve pain as best as possible with the least amount of medication. Treatment Goals: As above    Opiate risks  There are risks associated with opioid medications, including dependence, addiction and tolerance. The patient understands and agrees to use these medications only as prescribed. Potential side effects of the medications include, but are not limited to, constipation, drowsiness, addiction, impaired judgment and risk of fatal overdose if not taken as prescribed. The patient was warned against driving while taking sedation medications. Sharing medications is a felony. At this point in time, the patient is showing no signs of addiction, abuse, diversion or suicidal ideation. PDMP review  [place]MP or [place]  reviewed.  No red flags were identified; safe to proceed with prescription      discussing prognosis, risks and benefits of treatment options, instructions for management, importance of treatment compliance, impressions, counseling/coordination of care and documenting in the medical record. Subjective     Opioid Management:   Type of visit: Follow-up    Pain related diagnoses: chronic neck, hip, back pain    Interval history: Pt states tramadol not helping as much as he hoped. Aberrant behavior?: No      Adverse effects from medication?: No      Screening Tools/Assessments:    Brief Pain Inventory (BPI):  1) Throughout our lives, most of us have had pain from time to time (such as minor headaches, sprains, and toothaches). Have you had pain other than these everyday kinds of pain today? Yes  2) Where is your pain located? neck, back, hip pain  3) Rate your pain at its worst in the last 24 hours: 6  4) Rate your pain at its least in the last 24 hours: 5  5) Rate your average level of pain: 6  6) Rate your pain right now: 5  7) What treatments or medications are you receiving for your pain? tramadol  8) In the past 24 hours, how much relief have pain treatments or medication provided? 30%  9) During the past 24 hours, pain has interfered with your:     A) General activity: 9     B) Mood: 6     C) Walking ability: 2     D) Normal work (work outside the home & housework): 3     E) Relations with other people: 6     F) Sleep: 8     G) Enjoyment of life: 8    Opioid agreement:  Active Opioid agreement on file?: Yes    Opioid agreement signed date: 6/29/2023  Opioid agreement expiration date: 6/28/2024    Naloxone:  Currently prescribed Naloxone (Narcan): No    Reason not prescribing Naloxone: patient declines    Recheck on COPD as well as chronic pain. Patient states recent tramadol usage has not helped significantly. Most important pain is in the neck area. States the [employer] has helped his COPD. Not fully using albuterol as needed.     Pain Medications traMADol (Ultram) 50 mg tablet Take 1 tab twice a day as needed for severe pain         Outpatient Morphine Milligram Equivalents Per Day     7/5/23 and after Unknown    Order Name Dose Route Frequency Maximum MME/Day     traMADol (Ultram) 50 mg tablet     Unknown    Total Potential Morphine Milligram Equivalents Per Day Unknown    An error was encountered while attempting to calculate the morphine milligram equivalents per day. Calculation Information        traMADol (Ultram) 50 mg tablet    Not enough information to calculate morphine milligram equivalents per day. PDMP Review       Value Time User    PDMP Reviewed  Yes 6/20/2023  1:70 PM Albino Agosto DO         Review of Systems   Constitutional: Negative for fatigue. Eyes: Negative for visual disturbance. Respiratory: Negative for cough and shortness of breath. Cardiovascular: Negative for chest pain, palpitations and leg swelling. Gastrointestinal: Negative for abdominal pain. Musculoskeletal: Positive for back pain, neck pain and neck stiffness. Neurological: Negative for dizziness and headaches. Psychiatric/Behavioral: The patient is not nervous/anxious. Objective     /87 (BP Location: Left arm, Patient Position: Sitting, Cuff Size: Adult)   Pulse 80   Ht 5' 9.5" (1.765 m)   Wt 57.6 kg (127 lb)   SpO2 95%   BMI 18.49 kg/m²     Physical Exam  Vitals and nursing note reviewed. Constitutional:       Appearance: Normal appearance. He is well-developed. He is not ill-appearing. HENT:      Head: Normocephalic and atraumatic. Eyes:      Conjunctiva/sclera: Conjunctivae normal.   Neck:      Vascular: No carotid bruit. Cardiovascular:      Rate and Rhythm: Normal rate and regular rhythm. Heart sounds: No murmur heard. Pulmonary:      Effort: Pulmonary effort is normal. No respiratory distress.       Comments: Clear but slightly decreased breath sounds  Abdominal:      Palpations: Abdomen is soft. Tenderness: There is no abdominal tenderness. Musculoskeletal:         General: Normal range of motion. Cervical back: Neck supple. Right lower leg: No edema. Left lower leg: No edema. Lymphadenopathy:      Cervical: No cervical adenopathy. Skin:     General: Skin is warm and dry. Neurological:      General: No focal deficit present. Mental Status: He is alert and oriented to person, place, and time. Cranial Nerves: No cranial nerve deficit. Psychiatric:         Mood and Affect: Mood normal.         Behavior: Behavior normal.         Thought Content:  Thought content normal.         Judgment: Judgment normal.         David Chávez, DO

## 2023-07-10 ENCOUNTER — HOSPITAL ENCOUNTER (OUTPATIENT)
Facility: MEDICAL CENTER | Age: 68
Discharge: HOME | End: 2023-07-10
Payer: MEDICARE

## 2023-07-10 ENCOUNTER — APPOINTMENT (OUTPATIENT)
Dept: RADIOLOGY | Facility: MEDICAL CENTER | Age: 68
End: 2023-07-10
Payer: MEDICARE

## 2023-07-10 DIAGNOSIS — M54.2 CHRONIC NECK PAIN: ICD-10-CM

## 2023-07-10 DIAGNOSIS — M25.552 BILATERAL HIP PAIN: ICD-10-CM

## 2023-07-10 DIAGNOSIS — M48.02 FORAMINAL STENOSIS OF CERVICAL REGION: ICD-10-CM

## 2023-07-10 DIAGNOSIS — M25.551 BILATERAL HIP PAIN: ICD-10-CM

## 2023-07-10 DIAGNOSIS — G89.29 CHRONIC NECK PAIN: ICD-10-CM

## 2023-07-10 PROCEDURE — 72141 MRI NECK SPINE W/O DYE: CPT

## 2023-07-10 PROCEDURE — 73521 X-RAY EXAM HIPS BI 2 VIEWS: CPT

## 2023-07-10 PROCEDURE — G1004 CDSM NDSC: HCPCS

## 2023-07-21 ENCOUNTER — TELEPHONE (OUTPATIENT)
Dept: FAMILY MEDICINE CLINIC | Facility: CLINIC | Age: 68
End: 2023-07-21

## 2023-07-21 DIAGNOSIS — G89.29 CHRONIC MIDLINE LOW BACK PAIN WITHOUT SCIATICA: ICD-10-CM

## 2023-07-21 DIAGNOSIS — G89.29 CHRONIC NECK PAIN: Primary | ICD-10-CM

## 2023-07-21 DIAGNOSIS — M54.50 CHRONIC MIDLINE LOW BACK PAIN WITHOUT SCIATICA: ICD-10-CM

## 2023-07-21 DIAGNOSIS — M48.02 FORAMINAL STENOSIS OF CERVICAL REGION: ICD-10-CM

## 2023-07-21 DIAGNOSIS — M54.2 CHRONIC NECK PAIN: Primary | ICD-10-CM

## 2023-07-21 RX ORDER — HYDROCODONE BITARTRATE AND ACETAMINOPHEN 5; 325 MG/1; MG/1
1 TABLET ORAL EVERY 6 HOURS PRN
Qty: 30 TABLET | Refills: 0 | Status: SHIPPED | OUTPATIENT
Start: 2023-07-21 | End: 2023-07-24

## 2023-07-21 NOTE — TELEPHONE ENCOUNTER
Left voicemail message for patient to return call. Dany Worley, DO   (Today,  8:69 AM)  Tell the patient we can stop the tramadol and go to something stronger like Vicodin.   See if

## 2023-07-24 DIAGNOSIS — M54.2 CHRONIC NECK PAIN: Primary | ICD-10-CM

## 2023-07-24 DIAGNOSIS — G89.29 CHRONIC NECK PAIN: Primary | ICD-10-CM

## 2023-07-24 DIAGNOSIS — M48.02 FORAMINAL STENOSIS OF CERVICAL REGION: ICD-10-CM

## 2023-07-24 RX ORDER — OXYCODONE HYDROCHLORIDE AND ACETAMINOPHEN 5; 325 MG/1; MG/1
1 TABLET ORAL EVERY 8 HOURS PRN
Qty: 30 TABLET | Refills: 0 | Status: SHIPPED | OUTPATIENT
Start: 2023-07-24 | End: 2023-08-04 | Stop reason: SDUPTHER

## 2023-07-24 NOTE — TELEPHONE ENCOUNTER
Patient is at [facility] to  his hydrocodone, he was told it is on backorder and has been for months. He is asking for another medication in its place.   Thank you

## 2023-07-24 NOTE — TELEPHONE ENCOUNTER
I spoke to the patient, he is agreeable to a pain management consult, please place appropriate referral to [facility].   Thank you

## 2023-07-27 ENCOUNTER — TELEPHONE (OUTPATIENT)
Dept: FAMILY MEDICINE CLINIC | Facility: CLINIC | Age: 68
End: 2023-07-27

## 2023-07-27 DIAGNOSIS — K21.9 GASTROESOPHAGEAL REFLUX DISEASE, UNSPECIFIED WHETHER ESOPHAGITIS PRESENT: Primary | ICD-10-CM

## 2023-07-27 RX ORDER — FAMOTIDINE 20 MG/1
20 TABLET, FILM COATED ORAL 2 TIMES DAILY
Qty: 60 TABLET | Refills: 2 | Status: SHIPPED | OUTPATIENT
Start: 2023-07-27

## 2023-07-31 ENCOUNTER — TELEPHONE (OUTPATIENT)
Dept: FAMILY MEDICINE CLINIC | Facility: CLINIC | Age: 68
End: 2023-07-31

## 2023-07-31 DIAGNOSIS — K21.9 GASTROESOPHAGEAL REFLUX DISEASE, UNSPECIFIED WHETHER ESOPHAGITIS PRESENT: Primary | ICD-10-CM

## 2023-07-31 RX ORDER — PANTOPRAZOLE SODIUM 40 MG/1
40 TABLET, DELAYED RELEASE ORAL
Qty: 30 TABLET | Refills: 5 | Status: SHIPPED | OUTPATIENT
Start: 2023-07-31 | End: 2024-01-27

## 2023-07-31 NOTE — TELEPHONE ENCOUNTER
Spoke with patient. Pain management did not call yet. I gave patient there information so he can call them. Patient will be calling them tomorrow.

## 2023-07-31 NOTE — TELEPHONE ENCOUNTER
Pt called he has been taking oxycodone also he has been taking pepcid to protect his stomach however, pepcid is not helping him, pt would like to try a different med for his stomach. Thank you

## 2023-08-02 ENCOUNTER — TELEPHONE (OUTPATIENT)
Dept: PULMONOLOGY | Facility: CLINIC | Age: 68
End: 2023-08-02

## 2023-08-02 ENCOUNTER — CONSULT (OUTPATIENT)
Dept: PULMONOLOGY | Facility: CLINIC | Age: 68
End: 2023-08-02
Payer: MEDICARE

## 2023-08-02 VITALS
SYSTOLIC BLOOD PRESSURE: 128 MMHG | DIASTOLIC BLOOD PRESSURE: 72 MMHG | TEMPERATURE: 96.9 F | WEIGHT: 130 LBS | HEART RATE: 75 BPM | BODY MASS INDEX: 18.61 KG/M2 | OXYGEN SATURATION: 97 % | HEIGHT: 70 IN

## 2023-08-02 DIAGNOSIS — J43.9 PULMONARY EMPHYSEMA, UNSPECIFIED EMPHYSEMA TYPE (HCC): Primary | ICD-10-CM

## 2023-08-02 DIAGNOSIS — J45.909 REACTIVE AIRWAY DISEASE WITHOUT COMPLICATION, UNSPECIFIED ASTHMA SEVERITY, UNSPECIFIED WHETHER PERSISTENT: ICD-10-CM

## 2023-08-02 DIAGNOSIS — Z72.0 TOBACCO ABUSE: ICD-10-CM

## 2023-08-02 PROCEDURE — 99204 OFFICE O/P NEW MOD 45 MIN: CPT | Performed by: INTERNAL MEDICINE

## 2023-08-02 RX ORDER — ALBUTEROL SULFATE 2.5 MG/3ML
2.5 SOLUTION RESPIRATORY (INHALATION) EVERY 6 HOURS PRN
Qty: 180 ML | Refills: 3 | Status: SHIPPED | OUTPATIENT
Start: 2023-08-02

## 2023-08-02 NOTE — ASSESSMENT & PLAN NOTE
He has quit smoking for 1 month and I strongly advised ongoing abstinence.   45-pack-year smoking history    Next lung cancer screening CT should be in July 2024

## 2023-08-02 NOTE — ASSESSMENT & PLAN NOTE
Very significant panlobular and paraseptal emphysema both lungs. There is a upper lobe predominant distribution. There is also a 8 cm right lower lobe bleb. He is very short of breath from advanced emphysema. He also has weight and muscle loss that is suggestive of pulmonary cachexia. I do not think he is deriving too much benefit from inhalers due to lack of inspiratory flow. We provided him with a nebulizer machine and I wrote for nebulized albuterol to be used every 4-6 hours. I want to see if he derives any benefit from nebulized albuterol. We discussed pulmonary rehabilitation, BL VR, LV RS, lung transplantation and the pros and cons of each intervention for advanced emphysema. I discussed that he will need pulmonary function testing to qualify for pulmonary rehab or advanced intervention for emphysema. He wants to hold off on PFTs for now, read about [place], BL VR before deciding how he wants to proceed.     Continue Anoro daily  Follow-up in 3 months   Check CBC, allergy panel, alpha-1 antitrypsin  He has quit smoking for 1 month and I strongly advised ongoing abstinence

## 2023-08-02 NOTE — TELEPHONE ENCOUNTER
A nebulizer device SN# [id number] was given to the Pt in office and paperwork was faxed to Justine Buchanan along with INS INFO  And office notes.

## 2023-08-02 NOTE — PROGRESS NOTES
Pulmonary Outpatient Note   Derrell Frank 69 y.o. male MRN: [id number]  8/2/2023      Referring Physician: Kathya Murguia DO    Reason for Consultation:    Chief Complaint   Patient presents with   • Emphysema     Assessment/Plan:    1. Pulmonary emphysema, unspecified emphysema type ([address])  Assessment & Plan:  Very significant panlobular and paraseptal emphysema both lungs. There is a upper lobe predominant distribution. There is also a 8 cm right lower lobe bleb. He is very short of breath from advanced emphysema. He also has weight and muscle loss that is suggestive of pulmonary cachexia. I do not think he is deriving too much benefit from inhalers due to lack of inspiratory flow. We provided him with a nebulizer machine and I wrote for nebulized albuterol to be used every 4-6 hours. I want to see if he derives any benefit from nebulized albuterol. We discussed pulmonary rehabilitation, BL VR, LV RS, lung transplantation and the pros and cons of each intervention for advanced emphysema. I discussed that he will need pulmonary function testing to qualify for pulmonary rehab or advanced intervention for emphysema. He wants to hold off on PFTs for now, read about [place], BL VR before deciding how he wants to proceed. Continue Anoro daily  Follow-up in 3 months   Check CBC, allergy panel, alpha-1 antitrypsin  He has quit smoking for 1 month and I strongly advised ongoing abstinence    Orders:  -     Ambulatory Referral to Pulmonology  -     Complete PFT with post Bronchodilator and Six Minute walk; Future  -     Nebulizer  -     Nebulizer Supplies  -     albuterol (2.5 mg/3 mL) 0.083 % nebulizer solution; Take 3 mL (2.5 mg total) by nebulization every 6 (six) hours as needed for wheezing or shortness of breath  -     Alpha-1-antitrypsin; Future    2. Tobacco abuse  Assessment & Plan:  He has quit smoking for 1 month and I strongly advised ongoing abstinence.   45-pack-year smoking history    Next lung cancer screening CT should be in July 2024      3. Reactive airway disease without complication, unspecified asthma severity, unspecified whether persistent  -     CBC and differential; Future  -     [facility] Allergy Panel, Adult; Future        Health Maintenance  Immunization History   Administered Date(s) Administered   • COVID-19 MODERNA VACC 0.5 ML IM 03/17/2021, 04/14/2021   • INFLUENZA 11/13/2012, 12/10/2013, 12/09/2014, 09/17/2015   • Pneumococcal Conjugate Vaccine 20-valent (Pcv20), Polysace 07/05/2023   • Zoster 10/13/2017        CT Lung Cancer Screening: Next CT July 2024    Return in about 3 months (around 11/2/2023). History of Present Illness   HPI:  Momo Blackmon is a 69 y.o. male who has a past medical history of benign pulmonary nodules, diverticulosis, BPH, arthritis, depression/bipolar, insomnia, chronic back pain, history of tobacco abuse who is presenting for the evaluation of severe emphysema    He has severe dyspnea even when walking from one side of the room to another. He cannot even bathe without dyspnea. He first noticed poor breathing about 7 years ago. He quit smoking. 45 years of cigarettes/marijuana. In the beginning of March he started weaning and quit in July. He has been placed on Anoro 62.5-25 1 puff daily, albuterol as needed. He tried free samples of Spiriva and it didn't help him too much. He has been on Anoro for a month. He stopped albuterol PRN because it didn't help him. His wife does not smoke. 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 He has always been thin but within the last few years he has lost about 10 lbs.   He only eats about one regular meal a day as a habit from his younger years when he didn't have much money. No personal history of cancer or treatment of cancer. He does not have a nebulizer machine. Review of Systems   Constitutional: Positive for unexpected weight change. Negative for chills, diaphoresis and fever. Respiratory: Positive for shortness of breath. Negative for cough and wheezing.         Historical Information   Past Medical History:   Diagnosis Date   • Abnormal chest x-ray with multiple lung nodules     last assessed: April 25, 2012   • Anxiety    • Arthritis    • Bipolar 2 disorder, major depressive episode ([address])    • Chronic tension headaches     last assessed: April 25, 2012   • COPD (chronic obstructive pulmonary disease) ([address])    • Depression 08/17/1980   • Hypertension    • Insomnia    • Scoliosis      Past Surgical History:   Procedure Laterality Date   • CHOLECYSTECTOMY OPEN  01/2021   • COLONOSCOPY      1-2016 [facility]   • TONSILLECTOMY       Family History   Problem Relation Age of Onset   • Stroke Mother    • Other Mother         epilepsy    • Vision loss Mother    • Prostate cancer Father    • Dementia Father    • Depression Brother    • Bipolar disorder Brother    • Heart disease Brother        Meds/Allergies     Current Outpatient Medications:   •  albuterol (2.5 mg/3 mL) 0.083 % nebulizer solution, Take 3 mL (2.5 mg total) by nebulization every 6 (six) hours as needed for wheezing or shortness of breath, Disp: 180 mL, Rfl: 3  •  famotidine (PEPCID) 20 mg tablet, Take 1 tablet (20 mg total) by mouth 2 (two) times a day, Disp: 60 tablet, Rfl: 2  •  gabapentin (Neurontin) 100 mg capsule, Take 2 tabs at bed time, Disp: 60 capsule, Rfl: 2  •  oxyCODONE-acetaminophen (PERCOCET) 5-325 mg per tablet, Take 1 tablet by mouth every 8 (eight) hours as needed for moderate pain or severe pain Max Daily Amount: 3 tablets, Disp: 30 tablet, Rfl: 0  •  pantoprazole (PROTONIX) 40 mg tablet, Take 1 tablet (40 mg total) by mouth daily before breakfast Wait 30 minutes before eating breakfast after taking tablet, Disp: 30 tablet, Rfl: 5  •  umeclidinium-vilanterol 62.5-25 mcg/actuation inhaler, Inhale 1 puff daily, Disp: 60 blister, Rfl: 5  •  albuterol (Ventolin HFA) 90 mcg/act inhaler, Inhale 2 puffs every 6 (six) hours as needed for wheezing, Disp: 18 g, Rfl: 5  No Known Allergies    Vitals: Blood pressure 128/72, pulse 75, temperature (!) 96.9 °F (36.1 °C), temperature source Tympanic, height 5' 9.5" (1.765 m), weight 59 kg (130 lb), SpO2 97 %. Body mass index is 18.92 kg/m². Oxygen Therapy  SpO2: 97 %  Oxygen Therapy: None (Room air)    Physical Exam  Constitutional:       General: He is not in acute distress. Appearance: He is not ill-appearing, toxic-appearing or diaphoretic. Comments: Thin appearing   HENT:      Head: Normocephalic and atraumatic. Eyes:      General: No scleral icterus. Extraocular Movements: Extraocular movements intact. Cardiovascular:      Rate and Rhythm: Normal rate and regular rhythm. Pulmonary:      Effort: Pulmonary effort is normal. No respiratory distress. Breath sounds: No wheezing, rhonchi or rales. Comments: Diminished breath sounds throughout  Musculoskeletal:         General: Normal range of motion. Cervical back: Normal range of motion and neck supple. Right lower leg: No edema. Left lower leg: No edema. Lymphadenopathy:      Cervical: No cervical adenopathy. Skin:     General: Skin is warm. Neurological:      General: No focal deficit present. Mental Status: He is alert and oriented to person, place, and time. Mental status is at baseline. Psychiatric:         Mood and Affect: Mood normal.         Behavior: Behavior normal.         Thought Content: Thought content normal.         Labs: I have personally reviewed pertinent lab results.     ABG: No results found for: "PHART", "KWE9DHT", "PO2ART", "ILV3FRN", "P4CRKYAR", "BEART", "SOURCE",   BNP: No results found for: "BNP",   CBC:  Lab Results   Component Value Date    WBC 6.7 11/24/2021    HGB 16.4 11/24/2021    HCT 47.1 11/24/2021    MCV 89 11/24/2021     11/24/2021    EOSPCT 4 11/24/2021    EOSABS 0.3 11/24/2021    NEUTOPHILPCT 60 11/24/2021    LYMPHOPCT 26 11/24/2021   ,   CMP:   Lab Results   Component Value Date    SODIUM 143 11/24/2021    K 5.6 (H) 11/24/2021     11/24/2021    CO2 28 11/24/2021    ANIONGAP 3 (L) 04/08/2015    BUN 16 11/24/2021    CREATININE 0.90 11/24/2021    GLUCOSE 86 06/21/2016    CALCIUM 9.7 06/21/2016    AST 20 11/24/2021    ALT 13 11/24/2021    ALKPHOS 65 06/21/2016    PROT 6.3 06/21/2016    BILITOT <0.2 06/21/2016   ,   PT/INR: No results found for: "PT", "INR",   Troponin:   Lab Results   Component Value Date    TROPONINI <0.01 06/21/2016       Imaging and other studies: I have personally reviewed pertinent reports. and I have personally reviewed pertinent films in PACS  6/22/2023  Severe emphysema, right lower lobe bleb 8.8 cm. Previously measured in 2021 of similar size. Noncalcified pulmonary nodule 4 mm. Left upper lobe nodule measuring 3 mm without change. Overall pulmonary nodules are stable since 2015 are presumed to be benign. Pulmonary function testing:   Pulmonary Functions Testing Results:  He reports having PFTs done in the past but I do not have any documentation of any PFTs. He noted that he struggled with pulmonary function testing    Transthoracic Echo: none      Magen Orozco MD  Pulmonary, Critical Care and Sleep Medicine  [facility] SPECIALTY [facility]     Portions of the record may have been created with voice recognition software. Occasional wrong word or "sound a like" substitutions may have occurred due to the inherent limitations of voice recognition software. Please read the chart carefully and recognize, using context, where substitutions have occurred.

## 2023-08-02 NOTE — PATIENT INSTRUCTIONS
Tips for Living with COPD    Quitting smoking is paramount. Smoking will only cause more damage in lungs with COPD and lead to more trouble breathing. COPD will not get significantly worse without smoking and may actually improve after quitting smoking. Advanced interventions for COPD such as endobronchial valves, lung volume reduction, clinical trials, and lung transplantation all require that the patient quit smoking. If you have quit smoking already - CONGRATULATIONS on your accomplishment. When exercising or exerting yourself in anyway - I.e. climbing stairs or hills, try to focus on breathing out. You have to empty out your lungs after several breaths to prevent "breath stacking". COPD patients need more time to completely breath out before taking the next breath. Try using pursed lip breathing (mimic blowing out a candle) to empty out your lungs when feeling short of breath. Please use maintenance inhalers daily regardless of how you feel. Please only use rescue inhalers (albuterol, ProAir, Ventolin, levalbuterol, Atrovent, Xopenex, Combivent) only when short of breath or before exertion/exercise. If inhalers are prohibitively expensive there are often much cheaper options that an insurance would cover - please ask your pharmacist or insurance company to find a cheaper alternative for you. Daily exercise is important. Try to walk 15-30 minutes a day. Keep your nutrition balanced and avoid being overweight or underweight. Avoid people with respiratory illness, coughing, sneezing. Avoid touching your face without washing your hands first.  When you have allergies, try to wash your hands before rubbing your eyes/nose. Avoid fumes, second hand smoke, allergens, alena environments, wood fire stoves, campfires. Wear a mask when the air quality outside is bad. If you are on oxygen, please use your oxygen as prescribed.   Please get a pulse oximeter ($20-50 at [employer] etc.) and try to make sure your oxygen stays at 89% or higher. If you are on oxygen please see your pulmonologist at least once a year to re-check whether you need more or less oxygen.

## 2023-08-04 DIAGNOSIS — G89.29 CHRONIC NECK PAIN: ICD-10-CM

## 2023-08-04 DIAGNOSIS — M54.2 CHRONIC NECK PAIN: ICD-10-CM

## 2023-08-04 RX ORDER — OXYCODONE HYDROCHLORIDE AND ACETAMINOPHEN 5; 325 MG/1; MG/1
1 TABLET ORAL EVERY 8 HOURS PRN
Qty: 30 TABLET | Refills: 0 | Status: SHIPPED | OUTPATIENT
Start: 2023-08-04 | End: 2023-08-14 | Stop reason: SDUPTHER

## 2023-08-07 RX ORDER — OXYCODONE HYDROCHLORIDE AND ACETAMINOPHEN 5; 325 MG/1; MG/1
1 TABLET ORAL EVERY 8 HOURS PRN
Qty: 30 TABLET | Refills: 0 | OUTPATIENT
Start: 2023-08-07

## 2023-08-14 ENCOUNTER — OFFICE VISIT (OUTPATIENT)
Dept: PAIN MEDICINE | Facility: MEDICAL CENTER | Age: 68
End: 2023-08-14
Payer: MEDICARE

## 2023-08-14 ENCOUNTER — OFFICE VISIT (OUTPATIENT)
Dept: FAMILY MEDICINE CLINIC | Facility: CLINIC | Age: 68
End: 2023-08-14
Payer: MEDICARE

## 2023-08-14 VITALS
OXYGEN SATURATION: 96 % | SYSTOLIC BLOOD PRESSURE: 134 MMHG | HEART RATE: 95 BPM | HEIGHT: 69 IN | WEIGHT: 127.6 LBS | BODY MASS INDEX: 18.9 KG/M2 | DIASTOLIC BLOOD PRESSURE: 82 MMHG | TEMPERATURE: 97.9 F

## 2023-08-14 VITALS
WEIGHT: 126 LBS | HEIGHT: 70 IN | BODY MASS INDEX: 18.04 KG/M2 | SYSTOLIC BLOOD PRESSURE: 141 MMHG | DIASTOLIC BLOOD PRESSURE: 98 MMHG | HEART RATE: 79 BPM

## 2023-08-14 DIAGNOSIS — M54.2 CHRONIC NECK PAIN: ICD-10-CM

## 2023-08-14 DIAGNOSIS — M47.816 LUMBAR SPONDYLOSIS: Primary | ICD-10-CM

## 2023-08-14 DIAGNOSIS — R35.1 BENIGN PROSTATIC HYPERPLASIA WITH NOCTURIA: Primary | ICD-10-CM

## 2023-08-14 DIAGNOSIS — G89.29 CHRONIC BILATERAL LOW BACK PAIN WITHOUT SCIATICA: ICD-10-CM

## 2023-08-14 DIAGNOSIS — N40.1 BENIGN PROSTATIC HYPERPLASIA WITH NOCTURIA: Primary | ICD-10-CM

## 2023-08-14 DIAGNOSIS — M54.50 CHRONIC BILATERAL LOW BACK PAIN WITHOUT SCIATICA: ICD-10-CM

## 2023-08-14 DIAGNOSIS — M47.812 CERVICAL SPONDYLOSIS: ICD-10-CM

## 2023-08-14 DIAGNOSIS — G89.29 CHRONIC NECK PAIN: ICD-10-CM

## 2023-08-14 PROCEDURE — 99204 OFFICE O/P NEW MOD 45 MIN: CPT | Performed by: PHYSICAL MEDICINE & REHABILITATION

## 2023-08-14 PROCEDURE — 99214 OFFICE O/P EST MOD 30 MIN: CPT | Performed by: FAMILY MEDICINE

## 2023-08-14 RX ORDER — PREDNISONE 20 MG/1
TABLET ORAL
COMMUNITY
Start: 2023-08-11

## 2023-08-14 RX ORDER — OXYCODONE HYDROCHLORIDE AND ACETAMINOPHEN 5; 325 MG/1; MG/1
1 TABLET ORAL EVERY 8 HOURS PRN
Qty: 30 TABLET | Refills: 0 | Status: SHIPPED | OUTPATIENT
Start: 2023-08-14

## 2023-08-14 RX ORDER — TAMSULOSIN HYDROCHLORIDE 0.4 MG/1
0.4 CAPSULE ORAL
Qty: 30 CAPSULE | Refills: 5 | Status: SHIPPED | OUTPATIENT
Start: 2023-08-14

## 2023-08-14 NOTE — PATIENT INSTRUCTIONS
Arthritis   WHAT YOU NEED TO KNOW:   Arthritis is pain or disease in one or more joints. There are many types of arthritis. Types such as rheumatoid arthritis cause inflammation in the joints. Other types wear away the cartilage between joints, such as osteoarthritis. This makes the bones of the joint rub together when you move the joint. An infection from bacteria, a virus, or a fungus can also cause arthritis. Your symptoms may be constant, or symptoms may come and go. Arthritis often gets worse over time and can cause permanent joint damage. DISCHARGE INSTRUCTIONS:   Call your doctor or rheumatologist if:   You have a fever and severe joint pain or swelling. You cannot move the affected joint. You have severe joint pain you cannot tolerate. You have a new or worsening rash. Your pain or swelling does not get better with treatment. You have questions or concerns about your condition or care. Medicines:   Acetaminophen  decreases pain and fever. It is available without a doctor's order. Ask how much to take and how often to take it. Follow directions. Read the labels of all other medicines you are using to see if they also contain acetaminophen, or ask your doctor or pharmacist. Acetaminophen can cause liver damage if not taken correctly. NSAIDs , such as ibuprofen, help decrease swelling, pain, and fever. This medicine is available with or without a doctor's order. NSAIDs can cause stomach bleeding or kidney problems in certain people. If you take blood thinner medicine, always ask your healthcare provider if NSAIDs are safe for you. Always read the medicine label and follow directions. Steroids  reduce swelling and pain. Prescription pain medicine  may be given. Ask your healthcare provider how to take this medicine safely. Some prescription pain medicines contain acetaminophen. Do not take other medicines that contain acetaminophen without talking to your healthcare provider.  Too much acetaminophen may cause liver damage. Prescription pain medicine may cause constipation. Ask your healthcare provider how to prevent or treat constipation. Take your medicine as directed. Contact your healthcare provider if you think your medicine is not helping or if you have side effects. Tell your provider if you are allergic to any medicine. Keep a list of the medicines, vitamins, and herbs you take. Include the amounts, and when and why you take them. Bring the list or the pill bottles to follow-up visits. Carry your medicine list with you in case of an emergency. Manage your symptoms:   Rest your painful joint so it can heal.  Your healthcare provider may recommend crutches or a walker if the affected joint is in a leg. Apply ice or heat to the joint. Both can help decrease swelling and pain. Ice may also help prevent tissue damage. Use an ice pack, or put crushed ice in a plastic bag. Cover it with a towel and place it on your joint for 15 to 20 minutes every hour or as directed. You can apply heat for 20 minutes every 2 hours. Heat treatment includes hot packs or heat lamps. Elevate your joint. Elevation helps reduce swelling and pain. Raise your joint above the level of your heart as often as you can. Prop your painful joint on pillows to keep it above your heart comfortably. Manage arthritis:   Talk to your healthcare providers about your arthritis medicines. Some medicines may only be needed when you have arthritis pain. You may need to take other medicines every day to prevent arthritis from getting worse. Your healthcare providers will help you understand all your medicines and when to take them. It is important to take the medicines as directed, even if you start to feel better. You can continue to have joint damage and inflammation even if you do not feel it. Eat a variety of healthy foods.   Healthy foods include fruits, vegetables, whole-grain breads, low-fat dairy products, beans, lean meats, and fish. Ask if you need to be on a special diet. A diet rich in calcium and vitamin D may decrease your risk of osteoporosis. Foods high in calcium include milk, cheese, broccoli, and tofu. Vitamin D may be found in meat, fish, fortified milk, cereal and bread. Ask if you need calcium or vitamin D supplements. Go to physical or occupational therapy as directed. A physical therapist can teach you exercises to improve flexibility and range of motion. You may also be shown non-weight-bearing exercises that are safe for your joints, such as swimming. Exercise can help keep your joints flexible and reduce pain. An occupational therapist can help you learn to do your daily activities when your joints are stiff or sore. Maintain a healthy weight. Extra weight puts increased pressure on your joints. Ask your healthcare provider what you should weigh. If you need to lose weight, he or she can help you create a weight loss program. Weight loss can help reduce pain and increase your ability to do your activities. Wear flat or low-heeled shoes. This will help decrease pain and reduce pressure on your ankle, knee, and hip joints. Do not smoke. Nicotine and other chemicals in cigarettes and cigars can damage your bones and joints. Ask your healthcare provider for information if you currently smoke and need help to quit. E-cigarettes or smokeless tobacco still contain nicotine. Talk to your healthcare provider before you use these products. Support devices:   Orthotic shoes or insoles  help support your feet when you walk. Crutches, a cane, or a walker  may help decrease your risk for falling. They also decrease stress on affected joints. Devices to prevent falls  include raised toilet seats and bathtub bars to help you get up from sitting. Handrails can be placed in areas where you need balance and support.          Devices to help with support and rest  include splints to wear on your hands and a firm pillow while you sleep. Use a pillow that is firm enough to support your neck and head. Follow up with your healthcare provider or rheumatologist as directed:  Write down your questions so you remember to ask them during your visits. © Copyright Tavia Peacock 2022 Information is for End User's use only and may not be sold, redistributed or otherwise used for commercial purposes. The above information is an  only. It is not intended as medical advice for individual conditions or treatments. Talk to your doctor, nurse or pharmacist before following any medical regimen to see if it is safe and effective for you.

## 2023-08-14 NOTE — PROGRESS NOTES
Assessment  1. Lumbar spondylosis    2. Chronic bilateral low back pain without sciatica    3. Chronic neck pain    4. Cervical spondylosis        Plan  1. The patient has been experiencing moderate to severe axial spine pain that is causing functional deficit. The pain has been present for at least 3 months and is not improving with conservative care. Currently the patient is not experiencing any radicular features nor neurogenic claudication. Non-facet pathology has been ruled out on clinical evaluation. We will schedule the patient for bilateral L3-5 medial branch nerve blocks with intention of moving forward towards radiofrequency ablation if there is an appropriate diagnostic response. The initial blocks will be performed with 2% lidocaine and if an appropriate response is obtained upon review of the patient's pain diary, a confirmatory block will be scheduled with 0.5% bupivacaine. In the office today, we reviewed the nature of facet joint pathology in depth using a spine model. We discussed the approach we would use for the injections and provided literature for home review. The patient understands the risks associated with the procedure including bleeding, infection, tissue injury, and allergic reaction and provided verbal informed consent in the office today. 2.  Would recommend against chronic full agonist opioid use given the patient's significant lung problems with emphysema and COPD. Primary care team could consider transitioning to Butrans patches which may provide pain relief as well as much less risk for respiratory depression. I reviewed today with the patient that we would be focusing on interventional options for him which he agreed to. 3. he likely would not be able to tolerate physical therapy given the significant comorbid lung condition.     My impressions and treatment recommendations were discussed in detail with the patient who verbalized understanding and had no further questions. Discharge instructions were provided. I personally saw and examined the patient and I agree with the above discussed plan of care. No orders of the defined types were placed in this encounter. New Medications Ordered This Visit   Medications   • Cholecalciferol 50 MCG (2000 UT) CAPS     Sig: Take 1 capsule by mouth daily   • predniSONE 20 mg tablet       History of Present Illness    Carol Baker is a 69 y.o. male seen in consultation at the request of Dr. Ruth Santiago regarding chronic pain. He has been experiencing symptoms as a result of pain from an injury that occurred 32 years ago as well as a diagnosis of Reactive Arthritis (Angel's). He is currently describing moderate to severe intensity pain worst rated as a 6/10 which is constant without any typical pattern characterized as burning sharp pressure-like type pain. The most severe pain is in his axial lumbar spine next is his cervical spine and third is his thoracic spine. He is also complaining of bilateral forearm pain which is more recent. Aggravating factors include lying down standing bending sitting and walking. Alleviating factors are unknown. Diagnostic studies include x-rays as well as MRI of the cervical and lumbar spine regions. These were reviewed today and demonstrate some mild degenerative changes. He has tried physical therapy with moderate relief also noted moderate relief from heat or ice application and chiropractic care. Social history negative for tobacco marijuana use positive for a glass of wine once per month. He did trial tramadol as well as currently using oxycodone. He feels the most benefit from oxycodone and otherwise is using gabapentin but does not notice significant benefit from that. I have personally reviewed and/or updated the patient's past medical history, past surgical history, family history, social history, current medications, allergies, and vital signs today. Review of Systems   Unable to perform ROS: Other (depression)   Constitutional: Positive for appetite change (weight loss). Negative for fatigue, fever and unexpected weight change. HENT: Negative for hearing loss, sinus pain and sore throat. Eyes: Negative for pain, redness and visual disturbance. Respiratory: Positive for cough, shortness of breath and wheezing. Cardiovascular: Negative for chest pain, palpitations and leg swelling. Gastrointestinal: Positive for abdominal pain and nausea. Negative for constipation and diarrhea. Endocrine: Negative for polydipsia and polyuria. Genitourinary: Positive for dysuria and frequency. Negative for difficulty urinating and hematuria. Musculoskeletal: Positive for myalgias. Negative for gait problem and joint swelling. Skin: Negative for rash. Allergic/Immunologic: Negative for immunocompromised state. Neurological: Negative for dizziness, weakness, numbness and headaches. Hematological: Does not bruise/bleed easily. Psychiatric/Behavioral: Positive for decreased concentration. Negative for dysphoric mood. The patient is nervous/anxious.         Patient Active Problem List   Diagnosis   • Chronic midline low back pain without sciatica   • Chronic neck pain   • Chronic midline thoracic back pain   • Pulmonary emphysema (HCC)   • Hyperlipidemia   • Bipolar affective disorder, depressed, severe (HCC)   • BPH (benign prostatic hyperplasia)   • Generalized anxiety disorder   • Diverticulosis   • Encounter for long-term (current) use of other medications   • Erectile dysfunction of non-organic origin   • Pulmonary nodule seen on imaging study   • Tobacco abuse   • Vitamin D deficiency   • Mild protein-calorie malnutrition ([facility])       Past Medical History:   Diagnosis Date   • Abnormal chest x-ray with multiple lung nodules     last assessed: April 25, 2012   • Anxiety    • Arthritis    • Bipolar 2 disorder, major depressive episode ([facility])    • Chronic tension headaches     last assessed: 2012   • COPD (chronic obstructive pulmonary disease) ([facility])    • Depression 1980   • Hypertension    • Insomnia    • Scoliosis        Past Surgical History:   Procedure Laterality Date   • CHOLECYSTECTOMY OPEN  2021   • COLONOSCOPY       [facility]   • TONSILLECTOMY         Family History   Problem Relation Age of Onset   • Stroke Mother    • Other Mother         epilepsy    • Vision loss Mother    • Prostate cancer Father    • Dementia Father    • Depression Brother    • Bipolar disorder Brother    • Heart disease Brother        Social History     Occupational History   • Occupation: retired    Tobacco Use   • Smoking status: Former     Packs/day: 1.00     Years: 45.00     Total pack years: 45.00     Types: Cigarettes     Start date: 1969     Quit date: 2023     Years since quittin.1   • Smokeless tobacco: Never   • Tobacco comments:     1/2 pack per day. 40 pack year smoking history    Vaping Use   • Vaping Use: Never used   Substance and Sexual Activity   • Alcohol use:  Yes     Alcohol/week: 1.0 standard drink of alcohol     Types: 1 Glasses of wine per week     Comment: rarely   • Drug use: Not Currently     Types: Cocaine, Hashish, LSD, Marijuana   • Sexual activity: Not Currently     Partners: Female     Birth control/protection: Male Sterilization       Current Outpatient Medications on File Prior to Visit   Medication Sig   • albuterol (2.5 mg/3 mL) 0.083 % nebulizer solution Take 3 mL (2.5 mg total) by nebulization every 6 (six) hours as needed for wheezing or shortness of breath   • famotidine (PEPCID) 20 mg tablet Take 1 tablet (20 mg total) by mouth 2 (two) times a day   • gabapentin (Neurontin) 100 mg capsule Take 2 tabs at bed time   • oxyCODONE-acetaminophen (PERCOCET) 5-325 mg per tablet Take 1 tablet by mouth every 8 (eight) hours as needed for moderate pain or severe pain Max Daily Amount: 3 tablets   • pantoprazole (PROTONIX) 40 mg tablet Take 1 tablet (40 mg total) by mouth daily before breakfast Wait 30 minutes before eating breakfast after taking tablet   • albuterol (Ventolin HFA) 90 mcg/act inhaler Inhale 2 puffs every 6 (six) hours as needed for wheezing   • Cholecalciferol 50 MCG (2000 UT) CAPS Take 1 capsule by mouth daily (Patient not taking: Reported on 8/14/2023)   • predniSONE 20 mg tablet  (Patient not taking: Reported on 8/14/2023)   • umeclidinium-vilanterol 62.5-25 mcg/actuation inhaler Inhale 1 puff daily (Patient not taking: Reported on 8/14/2023)     No current facility-administered medications on file prior to visit. No Known Allergies    Physical Exam    /98   Pulse 79   Ht 5' 9.5" (1.765 m)   Wt 57.2 kg (126 lb)   BMI 18.34 kg/m²     Constitutional: normal, well developed, well nourished, alert, in no distress and non-toxic and no overt pain behavior. Eyes: anicteric  HEENT: grossly intact  Pulmonary:even and unlabored  Cardiovascular:No edema or pitting edema present  Skin:Normal without rashes or lesions and well hydrated  Psychiatric:Mood and affect appropriate  Neurologic:Cranial Nerves II-XII grossly intact  Musculoskeletal:normal, except for significant pain with lumbar extension which is limited, patient is able to transition from seated to standing position without difficulty tenderness to palpation is also elicited over the lumbar facet area    Imaging  7/10/2023 MRI  Narrative & Impression   MRI CERVICAL SPINE WITHOUT CONTRAST     INDICATION: M48.02: Spinal stenosis, cervical region  M54.2: Cervicalgia  G89.29: Other chronic pain.     COMPARISON:  None.     TECHNIQUE:  Multiplanar, multisequence imaging of the cervical spine was performed.  .        IMAGE QUALITY:  Diagnostic     FINDINGS:     ALIGNMENT: Slight retrolisthesis C3 on C4.     MARROW SIGNAL: Degenerative endplate marrow signal B7-3 and C4-5.     CERVICAL AND VISUALIZED THORACIC CORD:  Normal signal within the visualized cord.     PREVERTEBRAL AND PARASPINAL SOFT TISSUES:  Normal.     VISUALIZED POSTERIOR FOSSA:  The visualized posterior fossa demonstrates no abnormal signal.     CERVICAL DISC SPACES:     C2-C3: Small marginal osteophyte on the right. Mild right foraminal narrowing identified without central or foraminal narrowing.     C3-C4: Degenerative disc osteophyte complex with marginal osteophytes results in mild central and mild right foraminal narrowing.     C4-C5: Degenerative disc osteophyte complex with marginal osteophytes eccentric to the right results in moderate right foraminal narrowing. Minimal central stenosis.     C5-C6: No significant central or foraminal narrowing.     C6-C7: No central or foraminal narrowing.     C7-T1:  Normal.     UPPER THORACIC DISC SPACES:  Normal.     OTHER FINDINGS:  None.     IMPRESSION:     Spondylotic degenerative changes result in moderate right foraminal narrowing at C4-5 and mild bilateral foraminal narrowing at C3-4.  No cord compression or cord signal abnormality.              Workstation performed: [id number]

## 2023-08-17 ENCOUNTER — TELEPHONE (OUTPATIENT)
Age: 68
End: 2023-08-17

## 2023-08-17 NOTE — TELEPHONE ENCOUNTER
New Patient    What is the reason for the patient’s appointment? :Benign prostatic hyperplasia with nocturia    What office location does the patient prefer?:  [place]  Does patient have Imaging/Lab Results:    Have patient records been requested?:  If No, are the records showing in Epic:       INSURANCE:   Do we accept the patient's insurance or is the patient Self-Pay?:    Insurance Provider:Medicare   Plan Type/Number:   Member ID#:       HISTORY:   Has the patient had any previous Urologist(s)?:  No  Was the patient seen in the ED?:No    Has the patient had any outside testing done?:No    Does the patient have a personal history of cancer?:  No

## 2023-08-18 DIAGNOSIS — K21.9 GASTROESOPHAGEAL REFLUX DISEASE, UNSPECIFIED WHETHER ESOPHAGITIS PRESENT: ICD-10-CM

## 2023-08-18 PROBLEM — M54.30 SCIATIC LEG PAIN: Status: ACTIVE | Noted: 2023-04-01

## 2023-08-18 PROBLEM — J44.9 CHRONIC OBSTRUCTIVE LUNG DISEASE (HCC): Status: ACTIVE | Noted: 2018-02-16

## 2023-08-18 PROBLEM — R63.6 UNDERWEIGHT: Status: ACTIVE | Noted: 2023-08-10

## 2023-08-18 RX ORDER — FAMOTIDINE 20 MG/1
20 TABLET, FILM COATED ORAL 2 TIMES DAILY
Qty: 180 TABLET | Refills: 1 | Status: SHIPPED | OUTPATIENT
Start: 2023-08-18

## 2023-08-19 NOTE — PROGRESS NOTES
Assessment/Plan: Blood pressure stable. COPD continues to be an ongoing issue. Sees pulmonary. States inhalers do not seem to help him a whole lot. Not sure if he is going to be a candidate down the line for some daily low-dose prednisone. Issues with BPH. Discussed starting tamsulosin 1 every evening. Benefits and side effects discussed. Recommend consider flu shot in the fall based on his COPD. COVID-vaccine discussed. Continues to see pain management. We will continue to write for his current chronic pain medication. Aware that I am retiring. I will see him again in 3 months but then after that we will have to see who is going to write for any potential medication should he continue to need it. Recent hospitalization at [facility] reviewed. Labs reviewed. Again recheck 3 months. No problem-specific Assessment & Plan notes found for this encounter. Diagnoses and all orders for this visit:    Benign prostatic hyperplasia with nocturia  -     tamsulosin (FLOMAX) 0.4 mg; Take 1 capsule (0.4 mg total) by mouth daily with dinner  -     Ambulatory Referral to Urology; Future    Chronic neck pain  -     oxyCODONE-acetaminophen (PERCOCET) 5-325 mg per tablet; Take 1 tablet by mouth every 8 (eight) hours as needed for moderate pain or severe pain Max Daily Amount: 3 tablets        1. Benign prostatic hyperplasia with nocturia  tamsulosin (FLOMAX) 0.4 mg    Ambulatory Referral to Urology      2. Chronic neck pain  oxyCODONE-acetaminophen (PERCOCET) 5-325 mg per tablet          Subjective:        Patient ID: Kenia Hdez. is a 77 y.o. male. Chief Complaint   Patient presents with   • Follow-up       Medication recheck. Seeing pain management. Status post they have [facility] ER evaluation.       The following portions of the patient's history were reviewed and updated as appropriate: past medical history, past surgical history and problem list.      Review of Systems Constitutional: Negative for appetite change, fatigue, fever and unexpected weight change. HENT: Negative for congestion, ear pain, postnasal drip, rhinorrhea, sinus pressure, sinus pain and sore throat. Eyes: Negative for redness and visual disturbance. Respiratory: Negative for chest tightness. Chronic dyspnea on exertion   Cardiovascular: Negative for chest pain, palpitations and leg swelling. Gastrointestinal: Negative for abdominal distention, abdominal pain, diarrhea and nausea. Endocrine: Negative for cold intolerance and heat intolerance. Genitourinary: Negative for dysuria and hematuria. Musculoskeletal: Positive for back pain, neck pain and neck stiffness. Negative for arthralgias, gait problem and myalgias. Skin: Negative for pallor and rash. Neurological: Negative for dizziness, tremors, weakness, light-headedness and headaches. Psychiatric/Behavioral: Negative for behavioral problems. Objective:  /82 (BP Location: Left arm, Patient Position: Sitting, Cuff Size: Large)   Pulse 95   Temp 97.9 °F (36.6 °C) (Temporal)   Ht 5' 9" (1.753 m)   Wt 57.9 kg (127 lb 9.6 oz)   SpO2 96%   BMI 18.84 kg/m²        Physical Exam  Vitals and nursing note reviewed. Constitutional:       General: He is not in acute distress. Appearance: Normal appearance. HENT:      Head: Normocephalic. Eyes:      General: No scleral icterus. Pupils: Pupils are equal, round, and reactive to light. Cardiovascular:      Rate and Rhythm: Normal rate and regular rhythm. Heart sounds: Normal heart sounds. No murmur heard. Pulmonary:      Comments: Decreased breath sounds but clear  Abdominal:      General: There is no distension. Musculoskeletal:      Right lower leg: No edema. Left lower leg: No edema. Lymphadenopathy:      Cervical: No cervical adenopathy. Skin:     Coloration: Skin is not pale. Neurological:      General: No focal deficit present. Mental Status: He is alert and oriented to person, place, and time. Mental status is at baseline. Psychiatric:         Behavior: Behavior normal.         Thought Content:  Thought content normal.         Judgment: Judgment normal.

## 2023-08-22 DIAGNOSIS — K21.9 GASTROESOPHAGEAL REFLUX DISEASE, UNSPECIFIED WHETHER ESOPHAGITIS PRESENT: ICD-10-CM

## 2023-08-22 RX ORDER — PANTOPRAZOLE SODIUM 40 MG/1
40 TABLET, DELAYED RELEASE ORAL
Qty: 90 TABLET | Refills: 1 | Status: SHIPPED | OUTPATIENT
Start: 2023-08-22 | End: 2024-02-18

## 2023-08-28 DIAGNOSIS — G89.29 CHRONIC NECK PAIN: ICD-10-CM

## 2023-08-28 DIAGNOSIS — M54.2 CHRONIC NECK PAIN: ICD-10-CM

## 2023-08-29 RX ORDER — OXYCODONE HYDROCHLORIDE AND ACETAMINOPHEN 5; 325 MG/1; MG/1
1 TABLET ORAL EVERY 8 HOURS PRN
Qty: 30 TABLET | Refills: 0 | Status: SHIPPED | OUTPATIENT
Start: 2023-08-29

## 2023-09-05 ENCOUNTER — TELEPHONE (OUTPATIENT)
Age: 68
End: 2023-09-05

## 2023-09-05 NOTE — TELEPHONE ENCOUNTER
Cancelled patient procedure appointment for today 9/5 with Dr. Sandra Martinez. Went to ER and admitted for COPD.

## 2023-09-05 NOTE — TELEPHONE ENCOUNTER
Caller: Sakshi Levine     Doctor: Dr Roselia Eaton     Reason for call: Patient calling to reschedule procedure patient states admitted to ER for COPD.     Call back#: [phone]

## 2023-09-07 DIAGNOSIS — R35.1 BENIGN PROSTATIC HYPERPLASIA WITH NOCTURIA: ICD-10-CM

## 2023-09-07 DIAGNOSIS — N40.1 BENIGN PROSTATIC HYPERPLASIA WITH NOCTURIA: ICD-10-CM

## 2023-09-07 RX ORDER — TAMSULOSIN HYDROCHLORIDE 0.4 MG/1
0.4 CAPSULE ORAL
Qty: 90 CAPSULE | Refills: 2 | Status: SHIPPED | OUTPATIENT
Start: 2023-09-07

## 2023-09-11 ENCOUNTER — TELEPHONE (OUTPATIENT)
Dept: PULMONOLOGY | Facility: CLINIC | Age: 68
End: 2023-09-11

## 2023-09-11 DIAGNOSIS — G89.29 CHRONIC NECK PAIN: ICD-10-CM

## 2023-09-11 DIAGNOSIS — M54.2 CHRONIC NECK PAIN: ICD-10-CM

## 2023-09-11 RX ORDER — OXYCODONE HYDROCHLORIDE AND ACETAMINOPHEN 5; 325 MG/1; MG/1
1 TABLET ORAL EVERY 8 HOURS PRN
Qty: 30 TABLET | Refills: 0 | Status: CANCELLED | OUTPATIENT
Start: 2023-09-11

## 2023-09-11 NOTE — TELEPHONE ENCOUNTER
Patient called stating he needs our office to fast track his care now. He was in the hospital at [facility] on 8/9 and 9/5 for COPD Exacerbations and was advised to see his Pulmonary Doctor this week. At this time Trena Farr does not have any openings this month, he does not want to see another doctor at this time and wants Trena Farr to review his hospital notes. He wants to get this under control asap, discuss possible operations and maybe move up the PFT he has for October.  Please advise

## 2023-09-11 NOTE — TELEPHONE ENCOUNTER
Spoke with patient, scheduled tomorrow at 2:40 with Dr. Teri Huertas in [place]. Patient aware of date, time, and location.

## 2023-09-12 ENCOUNTER — OFFICE VISIT (OUTPATIENT)
Dept: PULMONOLOGY | Facility: CLINIC | Age: 68
End: 2023-09-12
Payer: MEDICARE

## 2023-09-12 VITALS
HEART RATE: 89 BPM | SYSTOLIC BLOOD PRESSURE: 104 MMHG | BODY MASS INDEX: 18.81 KG/M2 | TEMPERATURE: 98 F | DIASTOLIC BLOOD PRESSURE: 72 MMHG | WEIGHT: 127 LBS | HEIGHT: 69 IN | OXYGEN SATURATION: 95 %

## 2023-09-12 DIAGNOSIS — J43.9 PULMONARY EMPHYSEMA, UNSPECIFIED EMPHYSEMA TYPE (HCC): Primary | ICD-10-CM

## 2023-09-12 DIAGNOSIS — G89.29 CHRONIC NECK PAIN: ICD-10-CM

## 2023-09-12 DIAGNOSIS — Z72.0 TOBACCO ABUSE: ICD-10-CM

## 2023-09-12 DIAGNOSIS — M54.2 CHRONIC NECK PAIN: ICD-10-CM

## 2023-09-12 PROCEDURE — 99214 OFFICE O/P EST MOD 30 MIN: CPT | Performed by: INTERNAL MEDICINE

## 2023-09-12 RX ORDER — OXYCODONE HYDROCHLORIDE AND ACETAMINOPHEN 5; 325 MG/1; MG/1
1 TABLET ORAL EVERY 8 HOURS PRN
Qty: 30 TABLET | Refills: 0 | Status: SHIPPED | OUTPATIENT
Start: 2023-09-12

## 2023-09-12 NOTE — PROGRESS NOTES
Pulmonary Outpatient Follow Up Note   Dimas West. 77 y.o. male MRN: [id number]  9/12/2023      Reason for Consultation:    Chief Complaint   Patient presents with   • COPD     Assessment/Plan:    1. Pulmonary emphysema, unspecified emphysema type ([address])  Assessment & Plan:  Very significant panlobular and paraseptal emphysema both lungs. There is a upper lobe predominant distribution. There is also a 8 cm right lower lobe bleb. He is very short of breath from advanced emphysema. He also has weight and muscle loss that is suggestive of pulmonary cachexia    2 recent exacerbations requiring steroid tapers. Currently finishing steroid taper from exacerbation on 9/5. Continue Trelegy 200  Continue albuterol as needed nebulized up to 4 times a day  Refer to pulmonary rehab  Pending PFTs with 6-minute walk  May be candidate for BL VR. He is very interested. If he meets PFT criteria, I will order nuclear medicine BL VR protocol and have him discuss with Dr. Elpidio Penny      We will consider 3 times daily azithromycin or low-dose prednisone in the future if symptoms or exacerbations cannot be controlled  Has eosinophilia in peripheral blood, pending RAST  May be a candidate for benralizumab in the future    Orders:  -     Ambulatory Referral to Pulmonary Rehabilitation; Future    2. Tobacco abuse  Assessment & Plan:  Quit smoking since March 2023. 45-pack-year smoking history. Next lung cancer screening CT in July 2024        Health Maintenance  Immunization History   Administered Date(s) Administered   • COVID-19 MODERNA VACC 0.5 ML IM 03/17/2021, 04/14/2021   • INFLUENZA 11/13/2012, 12/10/2013, 12/09/2014, 09/17/2015   • Pneumococcal Conjugate Vaccine 20-valent (Pcv20), Polysace 07/05/2023   • Zoster 10/13/2017        CT Lung Cancer Screening: Next CT July 2024    Return Pending follow up testing.     History of Present Illness   HPI:  Dimas Wright is a 77 y.o. male who has a past medical history of benign pulmonary nodules, diverticulosis, BPH, arthritis, depression/bipolar, insomnia, chronic back pain, history of tobacco abuse who is presenting for the follow up of severe emphysema    9/12/23 - 2 exacerbations requiring brief inpatient hospitalization at [facility].  8/9/23 had severe shortness of breath while walking across the parking lot. 9/5/23 woke up with sudden onset shortness of breath and was hospitalized. ACS work-up was negative. Discharged on a prednisone taper. Currently still on 30 mg of prednisone to taper down to 20 mg and then 10 mg and then 7.5 mg and then 5 mg. Using Trelegy in the morning. Using nebulized albuterol. Not using albuterol inhaler as he is not deriving benefit. Very concerned and scared about recent exacerbations. Still has shortness of breath and pursed lip breathing consistently. Denies any mold or smoke in the house. He has not smoked since March 2023. He recently acquired a heat pump with UV light to help with house hygiene. He had the ducts cleaned out. ABG 7.47/37 during hospitalization. Recent CBC 8/9 showed increased eosinophils    Pulmonary history 8/2/23  He has severe dyspnea even when walking from one side of the room to another. He cannot even bathe without dyspnea. He first noticed poor breathing about 7 years ago. He quit smoking. 45 years of cigarettes/marijuana. In the beginning of March he started weaning and quit in July. His wife does not smoke. 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 He has always been thin but within the last few years he has lost about 10 lbs. He only eats about one regular meal a day as a habit from his younger years when he didn't have much money.     No personal history of cancer or treatment of cancer. [facility] summary: [facility] Course  This is a brief description of the patient's hospital stay; please refer to medical chart for further details. Satish Barrera. "Dejan Herbert" is 77 y.o. male with past medical history significant for COPD, Bipolar Disorder, Generalized Anxiety Disorder, BPH, Chronic Back Pain. He presented to [facility] with complaint of acute onset of shortness of breath. He woke from his sleep and started coughing and shortly after developed severe SOB and became diaphoretic. On arrival to the ED he reported his SOB had resolved but he was experiencing 3/10 dull, non-radiating chest pain which fortunately has since resolved. VSS on arrival to the ED. Labs significant initially for Hg of 8.8 but likely lab error as repeat as 13.0 (baseline 14-15), and HS troponin 38 --> 97-->224. CXR without acute abnormality. ECG showed NSR, biatrial enlargement, LAD. He received  mg in the ED as well as another DuoNeb and prednisone. He was evaluated by cardiology who started heparin drip for NSTEMI. He was admitted to [facility]. ECHO preformed which showed EF 55-60%, apical hypokinesis consistent with stress cardiomyopathy, started on lisinopril 2.5mg. He underwent LHC which did not show CAD. Pulm consulted, started on triple therapy inhaler and continued on prednisone taper for COPD exacerbation. O2 sat 95-97% on RA. He was evaluated by pulmonology who started patient on triple therapy. Patient's BP was on lower side with -110's so he was not started on metoprolol. He will follow up in cardiology office to discuss initiating metoprolol. He will monitor his BP closely at home. He was given referral for pulm rehab as well as CARES monitoring. He will continue to follow up with [facility] pulmonology for PFT's and 6min walk and continued management. Patients symptoms significantly improved and he was medically stable for discharge.     [facility] summary: 8/9  Hospital Course  This is a brief description of the patient's hospital stay; please refer to medical chart for further details. Niles Lam. "Sakina Owens" is 69 y.o. male with a past medical history of bipolar disorder, COPD, anxiety, BPH, chronic back pain, and former tobacco user. He presented to [facility] on 8/9/2023 with complaint of increased shortness of breath for 3 days prior to his ED evaluation. The patient reports having to take frequent breaks due to his shortness of breath. He was recently evaluated by pulmonology at [facility], started on albuterol nebulizer with outpatient testing recommended. He is to follow-up with pulmonology in November of this year. At home, the patient was using his albuterol nebulizer without relief. He otherwise denies fevers, increased cough, or ill contacts. He reports "my lungs are dead."  The patient's admission labs were unrevealing. Acute coronary syndrome was ruled out. Chest x-ray consistent with COPD, COVID/flu/RSV testing was negative. EKG without ischemic findings. The patient was treated with oral prednisone, nebulizer of albuterol, Perforomist, and Pulmicort. He is ambulating in the halls without increased tachycardia or desaturations of his oxygen. He was evaluated by physical therapy and cleared for discharge home. The discharge plan is discussed with the patient at bedside. He is advised to follow-up with his PCP and pulmonology. Return to ED indications have been reviewed. He was diagnosed with COPD exacerbation        Review of Systems   Constitutional: Positive for unexpected weight change. Negative for appetite change, chills, diaphoresis and fever. HENT: Negative for ear pain, postnasal drip, rhinorrhea, sneezing, sore throat and trouble swallowing. Respiratory: Positive for shortness of breath. Negative for cough and wheezing. Cardiovascular: Negative for chest pain. Musculoskeletal: Positive for myalgias.    Neurological: Negative for headaches.        Historical Information   Past Medical History:   Diagnosis Date   • Abnormal chest x-ray with multiple lung nodules     last assessed: April 25, 2012   • Anxiety    • Arthritis    • Bipolar 2 disorder, major depressive episode ([address])    • Chronic tension headaches     last assessed: April 25, 2012   • COPD (chronic obstructive pulmonary disease) ([address])    • Depression 08/17/1980   • Hypertension    • Insomnia    • Scoliosis      Past Surgical History:   Procedure Laterality Date   • CHOLECYSTECTOMY OPEN  01/2021   • COLONOSCOPY      1-2016 [facility]   • TONSILLECTOMY       Family History   Problem Relation Age of Onset   • Stroke Mother    • Other Mother         epilepsy    • Vision loss Mother    • Prostate cancer Father    • Dementia Father    • Depression Brother    • Bipolar disorder Brother    • Heart disease Brother        Meds/Allergies     Current Outpatient Medications:   •  albuterol (2.5 mg/3 mL) 0.083 % nebulizer solution, Take 3 mL (2.5 mg total) by nebulization every 6 (six) hours as needed for wheezing or shortness of breath, Disp: 180 mL, Rfl: 3  •  Fluticasone-Umeclidin-Vilant (TRELEGY ELLIPTA IN), Inhale, Disp: , Rfl:   •  oxyCODONE-acetaminophen (PERCOCET) 5-325 mg per tablet, Take 1 tablet by mouth every 8 (eight) hours as needed for moderate pain or severe pain Max Daily Amount: 3 tablets, Disp: 30 tablet, Rfl: 0  •  pantoprazole (PROTONIX) 40 mg tablet, TAKE 1 TABLET (40 MG TOTAL) BY MOUTH DAILY BEFORE BREAKFAST WAIT 30 MINUTES BEFORE EATING BREAKFAST AFTER TAKING TABLET, Disp: 90 tablet, Rfl: 1  •  predniSONE 20 mg tablet, , Disp: , Rfl:   •  tamsulosin (FLOMAX) 0.4 mg, TAKE 1 CAPSULE BY MOUTH EVERY DAY WITH DINNER, Disp: 90 capsule, Rfl: 2  •  Cholecalciferol 50 MCG (2000 UT) CAPS, Take 1 capsule by mouth daily (Patient not taking: Reported on 8/14/2023), Disp: , Rfl:   •  famotidine (PEPCID) 20 mg tablet, TAKE 1 TABLET BY MOUTH TWICE A DAY (Patient not taking: Reported on 9/12/2023), Disp: 180 tablet, Rfl: 1  •  gabapentin (Neurontin) 100 mg capsule, Take 2 tabs at bed time (Patient not taking: Reported on 9/12/2023), Disp: 60 capsule, Rfl: 2  No Known Allergies    Vitals: Blood pressure 104/72, pulse 89, temperature 98 °F (36.7 °C), temperature source Tympanic, height 5' 9" (1.753 m), weight 57.6 kg (127 lb), SpO2 95 %. Body mass index is 18.75 kg/m². Oxygen Therapy  SpO2: 95 %  Oxygen Therapy: None (Room air)    Physical Exam  Constitutional:       General: He is not in acute distress. Appearance: He is not ill-appearing, toxic-appearing or diaphoretic. Comments: Thin appearing   HENT:      Head: Normocephalic and atraumatic. Eyes:      General: No scleral icterus. Extraocular Movements: Extraocular movements intact. Cardiovascular:      Rate and Rhythm: Normal rate. Pulmonary:      Effort: Pulmonary effort is normal. No respiratory distress. Breath sounds: No stridor. Comments: Diminished breath sounds. Pursed lip breathing  Musculoskeletal:         General: Normal range of motion. Cervical back: Normal range of motion and neck supple. Right lower leg: No edema. Left lower leg: No edema. Lymphadenopathy:      Cervical: No cervical adenopathy. Skin:     General: Skin is warm. Neurological:      General: No focal deficit present. Mental Status: He is alert and oriented to person, place, and time. Mental status is at baseline. Psychiatric:         Mood and Affect: Mood normal.         Behavior: Behavior normal.         Thought Content: Thought content normal.         Labs: I have personally reviewed pertinent lab results.     ABG: No results found for: "PHART", "NJC3YWK", "PO2ART", "XJE2UYU", "L3QCEVGF", "BEART", "SOURCE",   BNP: No results found for: "BNP",   CBC:  Lab Results   Component Value Date    WBC 6.7 11/24/2021    HGB 16.4 11/24/2021    HCT 47.1 11/24/2021    MCV 89 11/24/2021     11/24/2021    EOSPCT 4 11/24/2021    EOSABS 0.3 11/24/2021    NEUTOPHILPCT 60 11/24/2021    LYMPHOPCT 26 11/24/2021   ,   CMP:   Lab Results   Component Value Date    SODIUM 143 11/24/2021    K 5.6 (H) 11/24/2021     11/24/2021    CO2 28 11/24/2021    ANIONGAP 3 (L) 04/08/2015    BUN 16 11/24/2021    CREATININE 0.90 11/24/2021    GLUCOSE 86 06/21/2016    CALCIUM 9.7 06/21/2016    AST 20 11/24/2021    ALT 13 11/24/2021    ALKPHOS 65 06/21/2016    PROT 6.3 06/21/2016    BILITOT <0.2 06/21/2016   ,   PT/INR: No results found for: "PT", "INR",   Troponin:   Lab Results   Component Value Date    TROPONINI <0.01 06/21/2016       Imaging and other studies: I have personally reviewed pertinent reports. and I have personally reviewed pertinent films in PACS  6/22/2023  Severe emphysema, right lower lobe bleb 8.8 cm. Previously measured in 2021 of similar size. Noncalcified pulmonary nodule 4 mm. Left upper lobe nodule measuring 3 mm without change. Overall pulmonary nodules are stable since 2015 are presumed to be benign. Pulmonary function testing:   Pulmonary Functions Testing Results:  He reports having PFTs done in the past but I do not have any documentation of any PFTs. He noted that he struggled with pulmonary function testing    Transthoracic Echo: none      Lorene Shepherd MD  Pulmonary, Critical Care and Sleep Medicine  [facility]     Portions of the record may have been created with voice recognition software. Occasional wrong word or "sound a like" substitutions may have occurred due to the inherent limitations of voice recognition software. Please read the chart carefully and recognize, using context, where substitutions have occurred.      Answers for HPI/ROS submitted by the patient on 9/11/2023  Do you have difficulty breathing?: Yes  Do you experience frequent throat clearing?: Yes  Chronicity: chronic  When did you first notice your symptoms?: more than 1 month ago  How often do your symptoms occur?: daily  Since you first noticed this problem, how has it changed?: gradually worsening  Do you have shortness of breath that occurs with effort or exertion?: Yes  Do you have ear congestion?: No  Do you have heartburn?: No  Do you have fatigue?: Yes  Do you have nasal congestion?: Yes  Do you have shortness of breath when lying flat?: No  Do you have shortness of breath when you wake up?: Yes  Do you have sweats?: Yes  Have you experienced weight loss?: Yes  Which of the following makes your symptoms worse?: any activity, climbing stairs, eating, exercise, minimal activity  Which of the following makes your symptoms better?: nothing  Risk factors for lung disease: smoking/tobacco exposure

## 2023-09-12 NOTE — ASSESSMENT & PLAN NOTE
Quit smoking since March 2023. 45-pack-year smoking history.     Next lung cancer screening CT in July 2024
Very significant panlobular and paraseptal emphysema both lungs. There is a upper lobe predominant distribution. There is also a 8 cm right lower lobe bleb. He is very short of breath from advanced emphysema. He also has weight and muscle loss that is suggestive of pulmonary cachexia    2 recent exacerbations requiring steroid tapers. Currently finishing steroid taper from exacerbation on 9/5. Continue Trelegy 200  Continue albuterol as needed nebulized up to 4 times a day  Refer to pulmonary rehab  Pending PFTs with 6-minute walk  May be candidate for BL VR. He is very interested.   If he meets PFT criteria, I will order nuclear medicine BL VR protocol and have him discuss with Dr. Chele Ambrosio      We will consider 3 times daily azithromycin or low-dose prednisone in the future if symptoms or exacerbations cannot be controlled  Has eosinophilia in peripheral blood, pending RAST  May be a candidate for benralizumab in the future
balance training/gait training/strengthening

## 2023-09-19 ENCOUNTER — TELEPHONE (OUTPATIENT)
Dept: PULMONOLOGY | Facility: CLINIC | Age: 68
End: 2023-09-19

## 2023-09-19 NOTE — TELEPHONE ENCOUNTER
Patient lvm asking to speak with Dr. Bianka Zapata or someone from the office. He states that he is scheduled for Pulmonary Rehab with us and [facility]. He wants to know if he has to keep both appointments or if this is duplicate work being done. Please advise.

## 2023-09-26 DIAGNOSIS — G89.29 CHRONIC NECK PAIN: ICD-10-CM

## 2023-09-26 DIAGNOSIS — M54.2 CHRONIC NECK PAIN: ICD-10-CM

## 2023-09-26 RX ORDER — OXYCODONE HYDROCHLORIDE AND ACETAMINOPHEN 5; 325 MG/1; MG/1
1 TABLET ORAL EVERY 8 HOURS PRN
Qty: 30 TABLET | Refills: 0 | Status: SHIPPED | OUTPATIENT
Start: 2023-09-26

## 2023-10-04 ENCOUNTER — HOSPITAL ENCOUNTER (OUTPATIENT)
Dept: PULMONOLOGY | Facility: HOSPITAL | Age: 68
Discharge: HOME | End: 2023-10-04
Payer: MEDICARE

## 2023-10-04 DIAGNOSIS — J43.9 PULMONARY EMPHYSEMA, UNSPECIFIED EMPHYSEMA TYPE (HCC): ICD-10-CM

## 2023-10-04 PROCEDURE — 94729 DIFFUSING CAPACITY: CPT | Performed by: STUDENT IN AN ORGANIZED HEALTH CARE EDUCATION/TRAINING PROGRAM

## 2023-10-04 PROCEDURE — 94060 EVALUATION OF WHEEZING: CPT | Performed by: STUDENT IN AN ORGANIZED HEALTH CARE EDUCATION/TRAINING PROGRAM

## 2023-10-04 PROCEDURE — 94618 PULMONARY STRESS TESTING: CPT

## 2023-10-04 PROCEDURE — 94060 EVALUATION OF WHEEZING: CPT

## 2023-10-04 PROCEDURE — 94726 PLETHYSMOGRAPHY LUNG VOLUMES: CPT

## 2023-10-04 PROCEDURE — 94729 DIFFUSING CAPACITY: CPT

## 2023-10-04 PROCEDURE — 94618 PULMONARY STRESS TESTING: CPT | Performed by: STUDENT IN AN ORGANIZED HEALTH CARE EDUCATION/TRAINING PROGRAM

## 2023-10-04 PROCEDURE — 94726 PLETHYSMOGRAPHY LUNG VOLUMES: CPT | Performed by: STUDENT IN AN ORGANIZED HEALTH CARE EDUCATION/TRAINING PROGRAM

## 2023-10-04 RX ORDER — ALBUTEROL SULFATE 2.5 MG/3ML
2.5 SOLUTION RESPIRATORY (INHALATION) ONCE AS NEEDED
Status: COMPLETED | OUTPATIENT
Start: 2023-10-04 | End: 2023-10-04

## 2023-10-04 RX ADMIN — ALBUTEROL SULFATE 2.5 MG: 2.5 SOLUTION RESPIRATORY (INHALATION) at 14:02

## 2023-10-05 ENCOUNTER — OFFICE VISIT (OUTPATIENT)
Dept: FAMILY MEDICINE CLINIC | Facility: CLINIC | Age: 68
End: 2023-10-05
Payer: MEDICARE

## 2023-10-05 ENCOUNTER — TELEPHONE (OUTPATIENT)
Dept: OTHER | Facility: HOSPITAL | Age: 68
End: 2023-10-05

## 2023-10-05 ENCOUNTER — OFFICE VISIT (OUTPATIENT)
Dept: PULMONOLOGY | Facility: CLINIC | Age: 68
End: 2023-10-05
Payer: MEDICARE

## 2023-10-05 ENCOUNTER — OFFICE VISIT (OUTPATIENT)
Dept: PAIN MEDICINE | Facility: MEDICAL CENTER | Age: 68
End: 2023-10-05
Payer: MEDICARE

## 2023-10-05 VITALS
DIASTOLIC BLOOD PRESSURE: 70 MMHG | SYSTOLIC BLOOD PRESSURE: 108 MMHG | BODY MASS INDEX: 20.14 KG/M2 | HEART RATE: 88 BPM | TEMPERATURE: 98.2 F | OXYGEN SATURATION: 95 % | HEIGHT: 69 IN | WEIGHT: 136 LBS

## 2023-10-05 VITALS
BODY MASS INDEX: 18.81 KG/M2 | HEART RATE: 42 BPM | HEIGHT: 69 IN | OXYGEN SATURATION: 91 % | SYSTOLIC BLOOD PRESSURE: 122 MMHG | WEIGHT: 127 LBS | TEMPERATURE: 97.9 F | DIASTOLIC BLOOD PRESSURE: 80 MMHG

## 2023-10-05 VITALS
HEART RATE: 96 BPM | HEIGHT: 69 IN | BODY MASS INDEX: 18.66 KG/M2 | WEIGHT: 126 LBS | SYSTOLIC BLOOD PRESSURE: 122 MMHG | DIASTOLIC BLOOD PRESSURE: 83 MMHG | OXYGEN SATURATION: 96 %

## 2023-10-05 DIAGNOSIS — M54.50 CHRONIC MIDLINE LOW BACK PAIN WITHOUT SCIATICA: ICD-10-CM

## 2023-10-05 DIAGNOSIS — F11.20 CONTINUOUS OPIOID DEPENDENCE (HCC): ICD-10-CM

## 2023-10-05 DIAGNOSIS — M47.812 CERVICAL SPONDYLOSIS: Primary | ICD-10-CM

## 2023-10-05 DIAGNOSIS — M54.6 CHRONIC MIDLINE THORACIC BACK PAIN: ICD-10-CM

## 2023-10-05 DIAGNOSIS — J44.9 STAGE 4 VERY SEVERE COPD BY GOLD CLASSIFICATION (HCC): ICD-10-CM

## 2023-10-05 DIAGNOSIS — M54.2 NECK PAIN: ICD-10-CM

## 2023-10-05 DIAGNOSIS — Z72.0 TOBACCO ABUSE: ICD-10-CM

## 2023-10-05 DIAGNOSIS — G89.29 CHRONIC MIDLINE THORACIC BACK PAIN: ICD-10-CM

## 2023-10-05 DIAGNOSIS — J43.9 PULMONARY EMPHYSEMA, UNSPECIFIED EMPHYSEMA TYPE (HCC): Primary | ICD-10-CM

## 2023-10-05 DIAGNOSIS — G89.29 CHRONIC MIDLINE LOW BACK PAIN WITHOUT SCIATICA: ICD-10-CM

## 2023-10-05 DIAGNOSIS — M54.2 CHRONIC NECK PAIN: ICD-10-CM

## 2023-10-05 DIAGNOSIS — G89.29 CHRONIC NECK PAIN: ICD-10-CM

## 2023-10-05 PROCEDURE — 99214 OFFICE O/P EST MOD 30 MIN: CPT | Performed by: INTERNAL MEDICINE

## 2023-10-05 PROCEDURE — 99214 OFFICE O/P EST MOD 30 MIN: CPT | Performed by: PHYSICAL MEDICINE & REHABILITATION

## 2023-10-05 PROCEDURE — 99214 OFFICE O/P EST MOD 30 MIN: CPT | Performed by: FAMILY MEDICINE

## 2023-10-05 RX ORDER — BENZONATATE 200 MG/1
200 CAPSULE ORAL 3 TIMES DAILY PRN
COMMUNITY
Start: 2023-10-02 | End: 2023-10-12

## 2023-10-05 RX ORDER — LISINOPRIL 2.5 MG/1
2.5 TABLET ORAL DAILY
COMMUNITY
Start: 2023-09-09 | End: 2024-09-08

## 2023-10-05 RX ORDER — AZITHROMYCIN 250 MG/1
250 TABLET, FILM COATED ORAL 3 TIMES WEEKLY
Qty: 12 TABLET | Refills: 3 | Status: SHIPPED | OUTPATIENT
Start: 2023-10-06 | End: 2024-01-26

## 2023-10-05 RX ORDER — BUDESONIDE 0.5 MG/2ML
INHALANT ORAL
COMMUNITY
Start: 2023-08-11 | End: 2023-10-05

## 2023-10-05 RX ORDER — ROFLUMILAST 250 UG/1
250 TABLET ORAL DAILY
Qty: 90 TABLET | Refills: 3 | Status: SHIPPED | OUTPATIENT
Start: 2023-10-05

## 2023-10-05 RX ORDER — AZITHROMYCIN 250 MG/1
250 TABLET, FILM COATED ORAL
COMMUNITY
Start: 2023-10-02 | End: 2023-10-05 | Stop reason: SDUPTHER

## 2023-10-05 NOTE — PROGRESS NOTES
Pulmonary Outpatient Follow Up Note   Dimas West. 77 y.o. male MRN: [id number]  10/5/2023      Reason for Consultation:    Chief Complaint   Patient presents with   • COPD     Assessment/Plan:    1. Pulmonary emphysema, unspecified emphysema type ([address])  Assessment & Plan:  Very severe COPD with air trapping and hyperinflation. FEV1 20% of predicted. DLCO 33% of predicted    Significant panlobular and paraseptal emphysema. Upper lobe distribution. 8 cm right lower lobe bleb. Gold stage 4E. Very symptomatic at baseline with shortness of breath with exertion. Frequent exacerbations with 4 hospitalizations over the past 2 months.    -Continue albuterol nebulizer twice a day, morning and afternoon, can go up to 3-4 times a day as needed  -Trelegy 200 once a day  -Completing 3-week prednisone taper from recent exacerbation  -Starting pulmonary rehab on October 24  -Continue 3 times weekly azithromycin started during recent exacerbation visit for anti-inflammatory purposes  -Starting Latesha Yan today, counseled patient on potential GI side effects and potential cost of the medication  -Good candidate for myairVo 3 trial.  I contacted the research coordinator      -Recent VBG during exacerbation 7.32/67. However previous arterial blood gases indicated no hypercapnia at baseline. Serum bicarbonate is 24-28.  -Repeat ABG now that he is not in exacerbation as BL VR criteria needs baseline PCO2 to be below 50 mmHg  -CT BL VR zephyr protocol    -If meeting ABG and CT criteria for BL VR, will refer to see Dr. Elpidio Penny    -I discussed with him that he needs to gain weight. Current BMI is 20. If he needs lung transplantation in the future    -Continue smoking cessation  -Had some elevated eosinophilia in the past.  600 eosinophils in August 2023 CBC. Can consider RAST in the future with repeat eosinophilia count to see if benralizumab may be a good option for him.       2. Stage 4 very severe COPD by GOLD classification (HCC)  -     roflumilast (DALIRESP) 250 MCG tablet; Take 1 tablet (250 mcg total) by mouth daily  -     azithromycin (ZITHROMAX) 250 mg tablet; Take 1 tablet (250 mg total) by mouth 3 (three) times a week  -     Blood gas, arterial; Future  -     CT chest without contrast; Future; Expected date: 10/05/2023    3. Tobacco abuse  Assessment & Plan:  45-pack-year smoking history. He quit in July 2023         Health Maintenance  Immunization History   Administered Date(s) Administered   • COVID-19 MODERNA VACC 0.5 ML IM 03/17/2021, 04/14/2021   • INFLUENZA 11/13/2012, 12/10/2013, 12/09/2014, 09/17/2015   • Pneumococcal Conjugate Vaccine 20-valent (Pcv20), Polysace 07/05/2023   • Zoster 10/13/2017        CT Lung Cancer Screening: Next CT July 2024    Return in about 2 months (around 12/5/2023). History of Present Illness   HPI:  Soco Renteria is a 77 y.o. male who has a past medical history of benign pulmonary nodules, diverticulosis, BPH, arthritis, depression/bipolar, insomnia, chronic back pain, history of tobacco abuse who is presenting for the follow up of severe emphysema    Since our last visit, he has yet had another exacerbation requiring 4-day hospitalization at [facility]. He was started on 3 times weekly azithromycin. He is on a 3-week prednisone taper    Using Trelegy in the morning. Using nebulized albuterol. He is not caring an albuterol inhaler with him as he is ambulating      9/12/23 - 2 exacerbations requiring brief inpatient hospitalization at [facility].  8/9/23 had severe shortness of breath while walking across the parking lot. 9/5/23 woke up with sudden onset shortness of breath and was hospitalized. ACS work-up was negative. Discharged on a prednisone taper. Currently still on 30 mg of prednisone to taper down to 20 mg and then 10 mg and then 7.5 mg and then 5 mg. Pulmonary history 8/2/23  He quit smoking. 45 years of cigarettes/marijuana.   In the beginning of 3/2023 he started weaning and quit in 7/2023. Dyspnea for at least 7 years, now with severe symptoms with exertion. Having frequent exacerbations starting of Summer 2023. His wife does not smoke. 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 He has always been thin but within the last few years he has lost about 10 lbs. He only eats about one regular meal a day as a habit from his younger years when he didn't have much money. Denies any mold or smoke in the house. He recently acquired a heat pump with UV light to help with house hygiene. He had the ducts cleaned out. No personal history of cancer or treatment of cancer. [facility] hospital summary: [facility] Course  This is a brief description of the patient's hospital stay; please refer to medical chart for further details. Jennifer Barton. "Janelle Lee" is 77 y.o. male with past medical history significant for COPD, Bipolar Disorder, Generalized Anxiety Disorder, BPH, Chronic Back Pain. He presented to [facility] with complaint of acute onset of shortness of breath. He woke from his sleep and started coughing and shortly after developed severe SOB and became diaphoretic. On arrival to the ED he reported his SOB had resolved but he was experiencing 3/10 dull, non-radiating chest pain which fortunately has since resolved. VSS on arrival to the ED. Labs significant initially for Hg of 8.8 but likely lab error as repeat as 13.0 (baseline 14-15), and HS troponin 38 --> 97-->224. CXR without acute abnormality. ECG showed NSR, biatrial enlargement, LAD. He received  mg in the ED as well as another DuoNeb and prednisone. He was evaluated by cardiology who started heparin drip for NSTEMI. He was admitted to [facility].  ECHO preformed which showed EF 55-60%, apical hypokinesis consistent with stress cardiomyopathy, started on lisinopril 2.5mg. He underwent LHC which did not show CAD. Pulm consulted, started on triple therapy inhaler and continued on prednisone taper for COPD exacerbation. O2 sat 95-97% on RA. He was evaluated by pulmonology who started patient on triple therapy. Patient's BP was on lower side with -110's so he was not started on metoprolol. He will follow up in cardiology office to discuss initiating metoprolol. He will monitor his BP closely at home. He was given referral for pulm rehab as well as CARES monitoring. He will continue to follow up with [facility]'s pulmonology for PFT's and 6min walk and continued management. Patients symptoms significantly improved and he was medically stable for discharge. [facility] hospital summary: 8/9  Hospital Course  This is a brief description of the patient's hospital stay; please refer to medical chart for further details. Adrianne Barbour. "Uvaldo Ramirez" is 69 y.o. male with a past medical history of bipolar disorder, COPD, anxiety, BPH, chronic back pain, and former tobacco user. He presented to [facility] on 8/9/2023 with complaint of increased shortness of breath for 3 days prior to his ED evaluation. The patient reports having to take frequent breaks due to his shortness of breath. He was recently evaluated by pulmonology at [facility], started on albuterol nebulizer with outpatient testing recommended. He is to follow-up with pulmonology in November of this year. At home, the patient was using his albuterol nebulizer without relief. He otherwise denies fevers, increased cough, or ill contacts. He reports "my lungs are dead."  The patient's admission labs were unrevealing. Acute coronary syndrome was ruled out. Chest x-ray consistent with COPD, COVID/flu/RSV testing was negative. EKG without ischemic findings.  The patient was treated with oral prednisone, nebulizer of albuterol, Perforomist, and Pulmicort. He is ambulating in the halls without increased tachycardia or desaturations of his oxygen. He was evaluated by physical therapy and cleared for discharge home. The discharge plan is discussed with the patient at bedside. He is advised to follow-up with his PCP and pulmonology. Return to ED indications have been reviewed. He was diagnosed with COPD exacerbation      Historical Information   Past Medical History:   Diagnosis Date   • Abnormal chest x-ray with multiple lung nodules     last assessed: April 25, 2012   • Anxiety    • Arthritis    • Bipolar 2 disorder, major depressive episode ([address])    • Chronic tension headaches     last assessed: April 25, 2012   • COPD (chronic obstructive pulmonary disease) ([address])    • Depression 08/17/1980   • Emphysema of lung ([address]) 1/01/2017    Noticed from my first ct scan   • GERD (gastroesophageal reflux disease)     Not sure of first occurance. Haven’t experienced any recent bouts. • Hypertension    • Insomnia    • Scoliosis    • Substance abuse ([address]) 1/01/1970    Use of marajuana. Some experiments with cocain, crank and lsd.      Past Surgical History:   Procedure Laterality Date   • CHOLECYSTECTOMY OPEN  01/2021   • COLONOSCOPY      1-2016 [facility]   • TONSILLECTOMY       Family History   Problem Relation Age of Onset   • Stroke Mother    • Other Mother         epilepsy    • Vision loss Mother    • Prostate cancer Father    • Dementia Father    • Depression Brother    • Bipolar disorder Brother    • Heart disease Brother        Meds/Allergies     Current Outpatient Medications:   •  albuterol (2.5 mg/3 mL) 0.083 % nebulizer solution, Take 3 mL (2.5 mg total) by nebulization every 6 (six) hours as needed for wheezing or shortness of breath, Disp: 180 mL, Rfl: 3  •  [START ON 10/6/2023] azithromycin (ZITHROMAX) 250 mg tablet, Take 1 tablet (250 mg total) by mouth 3 (three) times a week, Disp: 12 tablet, Rfl: 3  •  benzonatate (TESSALON) 200 MG capsule, Take 200 mg by mouth Three times daily as needed, Disp: , Rfl:   •  budesonide (PULMICORT) 0.5 mg/2 mL nebulizer solution, TAKE 2 ML (0.5 MG TOTAL) BY NEBULIZATION EVERY 12 (TWELVE) HOURS., Disp: , Rfl:   •  Fluticasone-Umeclidin-Vilant (TRELEGY ELLIPTA IN), Inhale, Disp: , Rfl:   •  lisinopril (ZESTRIL) 2.5 mg tablet, Take 2.5 mg by mouth daily, Disp: , Rfl:   •  oxyCODONE-acetaminophen (PERCOCET) 5-325 mg per tablet, Take 1 tablet by mouth every 8 (eight) hours as needed for moderate pain or severe pain Max Daily Amount: 3 tablets, Disp: 30 tablet, Rfl: 0  •  pantoprazole (PROTONIX) 40 mg tablet, TAKE 1 TABLET (40 MG TOTAL) BY MOUTH DAILY BEFORE BREAKFAST WAIT 30 MINUTES BEFORE EATING BREAKFAST AFTER TAKING TABLET, Disp: 90 tablet, Rfl: 1  •  predniSONE 20 mg tablet, 10 mg Prednisone taper, Disp: , Rfl:   •  roflumilast (DALIRESP) 250 MCG tablet, Take 1 tablet (250 mcg total) by mouth daily, Disp: 90 tablet, Rfl: 3  •  tamsulosin (FLOMAX) 0.4 mg, TAKE 1 CAPSULE BY MOUTH EVERY DAY WITH DINNER, Disp: 90 capsule, Rfl: 2  •  Cholecalciferol 50 MCG (2000 UT) CAPS, Take 1 capsule by mouth daily (Patient not taking: Reported on 8/14/2023), Disp: , Rfl:   •  famotidine (PEPCID) 20 mg tablet, TAKE 1 TABLET BY MOUTH TWICE A DAY (Patient not taking: Reported on 9/12/2023), Disp: 180 tablet, Rfl: 1  •  gabapentin (Neurontin) 100 mg capsule, Take 2 tabs at bed time (Patient not taking: Reported on 9/12/2023), Disp: 60 capsule, Rfl: 2  No current facility-administered medications for this visit. No Known Allergies    Vitals: Blood pressure 108/70, pulse 88, temperature 98.2 °F (36.8 °C), temperature source Tympanic, height 5' 9" (1.753 m), weight 61.7 kg (136 lb), SpO2 95 %. Body mass index is 20.08 kg/m². Oxygen Therapy  SpO2: 95 %  Oxygen Therapy: None (Room air)    Physical Exam  Constitutional:       General: He is not in acute distress. Appearance: He is not ill-appearing, toxic-appearing or diaphoretic. Comments:  Thin appearing   HENT:      Head: Normocephalic and atraumatic. Eyes:      General: No scleral icterus. Extraocular Movements: Extraocular movements intact. Cardiovascular:      Rate and Rhythm: Normal rate. Pulmonary:      Effort: Pulmonary effort is normal. No respiratory distress. Breath sounds: No stridor. Comments: Diminished breath sounds. Pursed lip breathing  Musculoskeletal:         General: Normal range of motion. Cervical back: Normal range of motion and neck supple. Right lower leg: No edema. Left lower leg: No edema. Lymphadenopathy:      Cervical: No cervical adenopathy. Skin:     General: Skin is warm. Neurological:      General: No focal deficit present. Mental Status: He is alert and oriented to person, place, and time. Mental status is at baseline. Psychiatric:         Mood and Affect: Mood normal.         Behavior: Behavior normal.         Thought Content: Thought content normal.         Labs: I have personally reviewed pertinent lab results.     ABG: No results found for: "PHART", "WSZ5SXD", "PO2ART", "XPL5UHH", "N5EVQGLY", "BEART", "SOURCE",   BNP: No results found for: "BNP",   CBC:  Lab Results   Component Value Date    WBC 6.7 11/24/2021    HGB 16.4 11/24/2021    HCT 47.1 11/24/2021    MCV 89 11/24/2021     11/24/2021    EOSPCT 4 11/24/2021    EOSABS 0.3 11/24/2021    NEUTOPHILPCT 60 11/24/2021    LYMPHOPCT 26 11/24/2021   ,   CMP:   Lab Results   Component Value Date    SODIUM 143 11/24/2021    K 5.6 (H) 11/24/2021     11/24/2021    CO2 28 11/24/2021    ANIONGAP 3 (L) 04/08/2015    BUN 16 11/24/2021    CREATININE 0.90 11/24/2021    GLUCOSE 86 06/21/2016    CALCIUM 9.7 06/21/2016    AST 20 11/24/2021    ALT 13 11/24/2021    ALKPHOS 65 06/21/2016    PROT 6.3 06/21/2016    BILITOT <0.2 06/21/2016   ,   PT/INR: No results found for: "PT", "INR",   Troponin:   Lab Results   Component Value Date    TROPONINI <0.01 06/21/2016       Imaging and other studies: I have personally reviewed pertinent reports. and I have personally reviewed pertinent films in PACS  6/22/2023  Severe emphysema, right lower lobe bleb 8.8 cm. Previously measured in 2021 of similar size. Noncalcified pulmonary nodule 4 mm. Left upper lobe nodule measuring 3 mm without change. Overall pulmonary nodules are stable since 2015 are presumed to be benign. Pulmonary function testing:   Pulmonary Functions Testing Results:                 Transthoracic Echo: none      Elli Ortega MD  Pulmonary, Critical Care and Sleep Medicine  [facility]     Portions of the record may have been created with voice recognition software. Occasional wrong word or "sound a like" substitutions may have occurred due to the inherent limitations of voice recognition software. Please read the chart carefully and recognize, using context, where substitutions have occurred.

## 2023-10-05 NOTE — TELEPHONE ENCOUNTER
----- Message from Niki Mcfadden MD sent at 10/5/2023 11:34 AM EDT -----  Kevin Gordon,    I am one of the pulmonologist.  I think that this patient can Osiris Mike with very severe COPD would benefit greatly from my Airvo    I discussed with him the clinical trial and he is willing to go through with it.   Please contact him if possible he is interested in the trial.    [facility]

## 2023-10-05 NOTE — PROGRESS NOTES
Assessment:  1. Cervical spondylosis    2. Neck pain        Plan:  1. The patient has been experiencing moderate to severe axial spine pain that is causing functional deficit. The pain has been present for at least 3 months and is not improving with conservative care. Currently the patient is not experiencing any radicular features nor neurogenic claudication. Non-facet pathology has been ruled out on clinical evaluation. We will schedule the patient for right C4-6 medial branch nerve blocks with intention of moving forward towards radiofrequency ablation if there is an appropriate diagnostic response. The initial blocks will be performed with 2% lidocaine and if an appropriate response is obtained upon review of the patient's pain diary, a confirmatory block will be scheduled with 0.5% bupivacaine. In the office today, we reviewed the nature of facet joint pathology in depth using a spine model. We discussed the approach we would use for the injections and provided literature for home review. The patient understands the risks associated with the procedure including bleeding, infection, tissue injury, and allergic reaction and provided verbal informed consent in the office today. #2 if he has a good outcome and would like to pursue treatment on the left side we will schedule him for that in the future as well    My impressions and treatment recommendations were discussed in detail with the patient who verbalized understanding and had no further questions. Discharge instructions were provided. I personally saw and examined the patient and I agree with the above discussed plan of care. Orders Placed This Encounter   Procedures   • FL spine and pain procedure     Standing Status:   Future     Standing Expiration Date:   10/5/2024     Order Specific Question:   Reason for Exam:     Answer:   (R) C4-6 MBB#1     Order Specific Question:   Anticoagulant hold needed?      Answer:   no     New Medications Ordered This Visit   Medications   • benzonatate (TESSALON) 200 MG capsule     Sig: Take 200 mg by mouth Three times daily as needed   • azithromycin (ZITHROMAX) 250 mg tablet     Sig: Take 250 mg by mouth   • budesonide (PULMICORT) 0.5 mg/2 mL nebulizer solution     Sig: TAKE 2 ML (0.5 MG TOTAL) BY NEBULIZATION EVERY 12 (TWELVE) HOURS. • lisinopril (ZESTRIL) 2.5 mg tablet     Sig: Take 2.5 mg by mouth daily       History of Present Illness:  Shilpa Santillan is a 77 y.o. male who presents for a follow up office visit in regards to chronic neck pain. 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 He is currently localizing pain in a C4-5 and C5-6 distribution bilaterally. He rates the pain as an 8/10 and this is constant usually worse at nighttime described as a sharp pain in his neck. Aggravating factors include cervical spine range of motion and this is limited in all planes and he does mention crepitus sensation as well. He is having at least moderate to severe functional deficits because of the neck pain and was recently hospitalized related to an exacerbation of COPD. He has had some medication adjustments and is on a prolonged course of antibiotics. He will reach out to his prescribing team to determine the length of this course of medication. I have personally reviewed and/or updated the patient's past medical history, past surgical history, family history, social history, current medications, allergies, and vital signs today. Review of Systems   Respiratory: Positive for shortness of breath. Cardiovascular: Negative for chest pain. Gastrointestinal: Positive for constipation. Negative for diarrhea, nausea and vomiting. Musculoskeletal: Positive for back pain, joint swelling and neck pain. Negative for arthralgias, gait problem and myalgias. Skin: Negative for rash. Neurological: Negative for dizziness, seizures and weakness. All other systems reviewed and are negative. Patient Active Problem List   Diagnosis   • Chronic midline low back pain without sciatica   • Chronic neck pain   • Chronic midline thoracic back pain   • Chronic obstructive lung disease ([address])   • Hyperlipidemia   • Bipolar affective disorder, depressed, severe (HCC)   • BPH (benign prostatic hyperplasia)   • Generalized anxiety disorder   • Diverticulosis   • Encounter for long-term (current) use of other medications   • Erectile dysfunction of non-organic origin   • Gastroesophageal reflux disease   • Pulmonary nodule seen on imaging study   • Tobacco abuse   • Vitamin D deficiency   • Mild protein-calorie malnutrition (HCC)   • Ruptured disk   • Sciatic leg pain   • Underweight       Past Medical History:   Diagnosis Date   • Abnormal chest x-ray with multiple lung nodules     last assessed: April 25, 2012   • Anxiety    • Arthritis    • Bipolar 2 disorder, major depressive episode ([address])    • Chronic tension headaches     last assessed: April 25, 2012   • COPD (chronic obstructive pulmonary disease) ([address])    • Depression 08/17/1980   • Emphysema of lung ([address]) 1/01/2017    Noticed from my first ct scan   • GERD (gastroesophageal reflux disease)     Not sure of first occurance. Haven’t experienced any recent bouts. • Hypertension    • Insomnia    • Scoliosis    • Substance abuse ([address]) 1/01/1970    Use of marajuana. Some experiments with cocain, crank and lsd.        Past Surgical History:   Procedure Laterality Date   • CHOLECYSTECTOMY OPEN  01/2021   • COLONOSCOPY      1-2016 [facility]   • TONSILLECTOMY         Family History   Problem Relation Age of Onset   • Stroke Mother    • Other Mother         epilepsy    • Vision loss Mother    • Prostate cancer Father    • Dementia Father    • Depression Brother    • Bipolar disorder Brother    • Heart disease Brother        Social History     Occupational History   • Occupation: retired    Tobacco Use   • Smoking status: Former     Packs/day: 1.00     Years: 48.00     Total pack years: 48.00     Types: Cigarettes     Start date: 1969     Quit date: 2023     Years since quittin.2   • Smokeless tobacco: Never   • Tobacco comments:     Quit about 1 month ago   Vaping Use   • Vaping Use: Never used   Substance and Sexual Activity   • Alcohol use: Yes     Alcohol/week: 1.0 standard drink of alcohol     Types: 1 Glasses of wine per week     Comment: Not even 1glass of wine per week. Often go for months on end   • Drug use: Not Currently     Types: Cocaine, Hashish, LSD, Marijuana   • Sexual activity: Not Currently     Partners: Female     Birth control/protection: Male Sterilization       Current Outpatient Medications on File Prior to Visit   Medication Sig   • albuterol (2.5 mg/3 mL) 0.083 % nebulizer solution Take 3 mL (2.5 mg total) by nebulization every 6 (six) hours as needed for wheezing or shortness of breath   • azithromycin (ZITHROMAX) 250 mg tablet Take 250 mg by mouth   • benzonatate (TESSALON) 200 MG capsule Take 200 mg by mouth Three times daily as needed   • budesonide (PULMICORT) 0.5 mg/2 mL nebulizer solution TAKE 2 ML (0.5 MG TOTAL) BY NEBULIZATION EVERY 12 (TWELVE) HOURS.    • Fluticasone-Umeclidin-Vilant (TRELEGY ELLIPTA IN) Inhale   • lisinopril (ZESTRIL) 2.5 mg tablet Take 2.5 mg by mouth daily   • oxyCODONE-acetaminophen (PERCOCET) 5-325 mg per tablet Take 1 tablet by mouth every 8 (eight) hours as needed for moderate pain or severe pain Max Daily Amount: 3 tablets   • pantoprazole (PROTONIX) 40 mg tablet TAKE 1 TABLET (40 MG TOTAL) BY MOUTH DAILY BEFORE BREAKFAST WAIT 30 MINUTES BEFORE EATING BREAKFAST AFTER TAKING TABLET   • predniSONE 20 mg tablet    • tamsulosin (FLOMAX) 0.4 mg TAKE 1 CAPSULE BY MOUTH EVERY DAY WITH DINNER   • Cholecalciferol 50 MCG (2000 UT) CAPS Take 1 capsule by mouth daily (Patient not taking: Reported on 8/14/2023)   • famotidine (PEPCID) 20 mg tablet TAKE 1 TABLET BY MOUTH TWICE A DAY (Patient not taking: Reported on 9/12/2023)   • gabapentin (Neurontin) 100 mg capsule Take 2 tabs at bed time (Patient not taking: Reported on 9/12/2023)     Current Facility-Administered Medications on File Prior to Visit   Medication   • [COMPLETED] albuterol inhalation solution 2.5 mg       No Known Allergies    Physical Exam:    /83   Pulse 96   Ht 5' 9" (1.753 m)   Wt 57.2 kg (126 lb)   SpO2 96%   BMI 18.61 kg/m²     Constitutional:normal, well developed, well nourished, alert, in no distress and non-toxic and no overt pain behavior.   Eyes:anicteric  HEENT:grossly intact  Neck: symmetric, trachea midline and no masses   Pulmonary:even and unlabored  Cardiovascular:No edema or pitting edema present  Skin:Normal without rashes or lesions and well hydrated  Psychiatric:Mood and affect appropriate  Neurologic:Cranial Nerves II-XII grossly intact  Musculoskeletal:normal, except for neck pain exacerbated by range of motion in all planes, patient describes crepitus sensation with range of motion activities    Imaging

## 2023-10-05 NOTE — PROGRESS NOTES
Assessment & Plan     Continue all inhalers as prescribed for emphysema as well as azithromycin 3 times a week. We are awaiting to see if he would be a candidate for a clinical trial coordinated through pulmonology. Follow-up with pain management for neck injection. The rest of pain management as per primary care physicians recommendations. 1. Pulmonary emphysema, unspecified emphysema type ([address])    2. Chronic midline low back pain without sciatica    3. Chronic midline thoracic back pain    4. Chronic neck pain    5. Continuous opioid dependence [facility])         Subjective     Transitional Care Management Review:   Cherylene Chain. is a 77 y.o. male here for TCM follow up. He presents today for transition of care visit after being admitted to the hospital due to COPD exacerbation. Started on azithromycin 3 times a week. Follows up with pulmonology. He is also following up with pain management due to chronic neck pain, he does have an injection upcoming. Currently on oxycodone prescribed by patient's primary care physician. Requesting an increase in the dosage. Review of Systems   Constitutional: Negative for activity change, appetite change, chills, fatigue and fever. HENT: Negative for congestion, rhinorrhea, sneezing and sore throat. Eyes: Negative for pain, discharge, redness and itching. Respiratory: Positive for shortness of breath. Negative for cough, chest tightness and wheezing. Cardiovascular: Negative for chest pain and palpitations. Gastrointestinal: Negative for abdominal pain, constipation, diarrhea, nausea and vomiting. Musculoskeletal: Positive for arthralgias, back pain, myalgias and neck pain. Negative for gait problem. Skin: Negative for rash. Neurological: Negative for dizziness, weakness, numbness and headaches. Hematological: Negative for adenopathy. Psychiatric/Behavioral: Negative for dysphoric mood.    All other systems reviewed and are negative. Objective     /80   Pulse (!) 42   Temp 97.9 °F (36.6 °C)   Ht 5' 9" (1.753 m)   Wt 57.6 kg (127 lb)   SpO2 91%   BMI 18.75 kg/m²      Physical Exam  Vitals reviewed. Constitutional:       General: He is not in acute distress. Appearance: Normal appearance. He is well-developed and normal weight. He is not ill-appearing or toxic-appearing. HENT:      Head: Normocephalic and atraumatic. Right Ear: External ear normal.      Left Ear: External ear normal.      Nose: Nose normal. No congestion or rhinorrhea. Mouth/Throat:      Mouth: Mucous membranes are moist.   Eyes:      General: No scleral icterus. Right eye: No discharge. Left eye: No discharge. Conjunctiva/sclera: Conjunctivae normal.      Pupils: Pupils are equal, round, and reactive to light. Cardiovascular:      Rate and Rhythm: Normal rate and regular rhythm. Pulses: Normal pulses. Heart sounds: Normal heart sounds. No murmur heard. Pulmonary:      Effort: Pulmonary effort is normal. No respiratory distress. Comments: Decreased breath sounds in lunge bases    Abdominal:      General: Abdomen is flat. There is no distension. Palpations: Abdomen is soft. There is no mass. Tenderness: There is no abdominal tenderness. Hernia: No hernia is present. Musculoskeletal:      Cervical back: Neck supple. Skin:     General: Skin is warm and dry. Capillary Refill: Capillary refill takes less than 2 seconds. Findings: No rash. Neurological:      General: No focal deficit present. Mental Status: He is alert. Motor: No weakness.    Psychiatric:         Mood and Affect: Mood normal.         Behavior: Behavior normal.       Medications have been reviewed by provider in current encounter    Becca Torres MD

## 2023-10-05 NOTE — PROGRESS NOTES
Chief Complaint   Patient presents with   • Follow-up     Follow up hospital.  COPD      Name: Juan Friedman. : 1955      MRN: [id number]  Encounter Provider: Marcia Tomas MD  Encounter Date: 10/5/2023   Encounter department: [facility] PRIMARY CARE    Assessment & Plan     1. Pulmonary emphysema, unspecified emphysema type ([address])    2. Chronic midline low back pain without sciatica    3. Chronic midline thoracic back pain    4. Chronic neck pain    5. Continuous opioid dependence ([address])           Subjective      HPI  Review of Systems    Current Outpatient Medications on File Prior to Visit   Medication Sig   • albuterol (2.5 mg/3 mL) 0.083 % nebulizer solution Take 3 mL (2.5 mg total) by nebulization every 6 (six) hours as needed for wheezing or shortness of breath   • [START ON 10/6/2023] azithromycin (ZITHROMAX) 250 mg tablet Take 1 tablet (250 mg total) by mouth 3 (three) times a week   • benzonatate (TESSALON) 200 MG capsule Take 200 mg by mouth Three times daily as needed   • Fluticasone-Umeclidin-Vilant (TRELEGY ELLIPTA IN) Inhale   • lisinopril (ZESTRIL) 2.5 mg tablet Take 2.5 mg by mouth daily   • oxyCODONE-acetaminophen (PERCOCET) 5-325 mg per tablet Take 1 tablet by mouth every 8 (eight) hours as needed for moderate pain or severe pain Max Daily Amount: 3 tablets   • pantoprazole (PROTONIX) 40 mg tablet TAKE 1 TABLET (40 MG TOTAL) BY MOUTH DAILY BEFORE BREAKFAST WAIT 30 MINUTES BEFORE EATING BREAKFAST AFTER TAKING TABLET   • predniSONE 20 mg tablet 10 mg Prednisone taper   • roflumilast (DALIRESP) 250 MCG tablet Take 1 tablet (250 mcg total) by mouth daily   • tamsulosin (FLOMAX) 0.4 mg TAKE 1 CAPSULE BY MOUTH EVERY DAY WITH DINNER   • [DISCONTINUED] azithromycin (ZITHROMAX) 250 mg tablet Take 250 mg by mouth   • [DISCONTINUED] budesonide (PULMICORT) 0.5 mg/2 mL nebulizer solution TAKE 2 ML (0.5 MG TOTAL) BY NEBULIZATION EVERY 12 (TWELVE) HOURS.    • [DISCONTINUED] Cholecalciferol 50 MCG (2000 UT) CAPS Take 1 capsule by mouth daily (Patient not taking: Reported on 8/14/2023)   • [DISCONTINUED] famotidine (PEPCID) 20 mg tablet TAKE 1 TABLET BY MOUTH TWICE A DAY (Patient not taking: Reported on 9/12/2023)   • [DISCONTINUED] gabapentin (Neurontin) 100 mg capsule Take 2 tabs at bed time (Patient not taking: Reported on 9/12/2023)       Objective     /80   Pulse (!) 42   Temp 97.9 °F (36.6 °C)   Ht 5' 9" (1.753 m)   Wt 57.6 kg (127 lb)   SpO2 91%   BMI 18.75 kg/m²     Physical Exam  Jordana Garcia MD

## 2023-10-05 NOTE — PATIENT INSTRUCTIONS
New medication - try Daliresp (roflumilast) - take once a day in the morning with food. If too costly then don't get it. If it causes stomach upset or nausea or diarrhea then stop it. A research coordinator may call you about the MyAirVO (which is a respiratory device wore at night to help with breathing while sleeping)    Testing - arterial blood gas done at the same place as your pulmonary function testing ([facility]). Get the CT chest done to see if your lungs are a good valve candidate. COPD medication regimen  Use Trelegy once a day  Use rescue albuterol inhaler with spacer while walking  Use nebulized albuterol twice a day  Completing 3 week of prednisone   Monday Wednesday Friday azithromycin     Tips for Living with COPD    Quitting smoking is paramount. Smoking will only cause more damage in lungs with COPD and lead to more trouble breathing. COPD will not get significantly worse without smoking and may actually improve after quitting smoking. Advanced interventions for COPD such as endobronchial valves, lung volume reduction, clinical trials, and lung transplantation all require that the patient quit smoking. If you have quit smoking already - CONGRATULATIONS on your accomplishment. When exercising or exerting yourself in anyway - I.e. climbing stairs or hills, try to focus on breathing out. You have to empty out your lungs after several breaths to prevent "breath stacking". COPD patients need more time to completely breath out before taking the next breath. Try using pursed lip breathing (mimic blowing out a candle) to empty out your lungs when feeling short of breath. Please use maintenance inhalers daily regardless of how you feel. Please only use rescue inhalers (albuterol, ProAir, Ventolin, levalbuterol, Atrovent, Xopenex, Combivent) only when short of breath or before exertion/exercise.   If inhalers are prohibitively expensive there are often much cheaper options that an insurance would cover - please ask your pharmacist or insurance company to find a cheaper alternative for you. Daily exercise is important. Try to walk 15-30 minutes a day. Keep your nutrition balanced and avoid being overweight or underweight. Avoid people with respiratory illness, coughing, sneezing. Avoid touching your face without washing your hands first.  When you have allergies, try to wash your hands before rubbing your eyes/nose. Avoid fumes, second hand smoke, allergens, alena environments, wood fire stoves, campfires. Wear a mask when the air quality outside is bad. If you are on oxygen, please use your oxygen as prescribed. Please get a pulse oximeter ($20-50 at [employer] etc.) and try to make sure your oxygen stays at 89% or higher. If you are on oxygen please see your pulmonologist at least once a year to re-check whether you need more or less oxygen. none known

## 2023-10-05 NOTE — ASSESSMENT & PLAN NOTE
Very severe COPD with air trapping and hyperinflation. FEV1 20% of predicted. DLCO 33% of predicted    Significant panlobular and paraseptal emphysema. Upper lobe distribution. 8 cm right lower lobe bleb. Gold stage 4E. Very symptomatic at baseline with shortness of breath with exertion. Frequent exacerbations with 4 hospitalizations over the past 2 months.    -Continue albuterol nebulizer twice a day, morning and afternoon, can go up to 3-4 times a day as needed  -Trelegy 200 once a day  -Completing 3-week prednisone taper from recent exacerbation  -Starting pulmonary rehab on October 24  -Continue 3 times weekly azithromycin started during recent exacerbation visit for anti-inflammatory purposes  -Starting Elliott Actis today, counseled patient on potential GI side effects and potential cost of the medication  -Good candidate for myairVo 3 trial.  I contacted the research coordinator      -Recent VBG during exacerbation 7.32/67. However previous arterial blood gases indicated no hypercapnia at baseline. Serum bicarbonate is 24-28.  -Repeat ABG now that he is not in exacerbation as BL VR criteria needs baseline PCO2 to be below 50 mmHg  -CT BL VR zephyr protocol    -If meeting ABG and CT criteria for BL VR, will refer to see Dr. Eva Webb    -I discussed with him that he needs to gain weight. Current BMI is 20. If he needs lung transplantation in the future    -Continue smoking cessation  -Had some elevated eosinophilia in the past.  600 eosinophils in August 2023 CBC. Can consider RAST in the future with repeat eosinophilia count to see if benralizumab may be a good option for him.

## 2023-10-06 ENCOUNTER — HOSPITAL ENCOUNTER (OUTPATIENT)
Dept: CT IMAGING | Facility: HOSPITAL | Age: 68
Discharge: HOME | End: 2023-10-06
Attending: INTERNAL MEDICINE
Payer: MEDICARE

## 2023-10-06 ENCOUNTER — TELEPHONE (OUTPATIENT)
Dept: FAMILY MEDICINE CLINIC | Facility: CLINIC | Age: 68
End: 2023-10-06

## 2023-10-06 ENCOUNTER — HOSPITAL ENCOUNTER (OUTPATIENT)
Dept: PULMONOLOGY | Facility: HOSPITAL | Age: 68
End: 2023-10-06
Attending: INTERNAL MEDICINE
Payer: MEDICARE

## 2023-10-06 DIAGNOSIS — J44.9 STAGE 4 VERY SEVERE COPD BY GOLD CLASSIFICATION (HCC): ICD-10-CM

## 2023-10-06 LAB
BASE EXCESS BLDA CALC-SCNC: 4 MMOL/L
HCO3 BLDA-SCNC: 28.7 MMOL/L (ref 22–28)
O2 CT BLDA-SCNC: 19.9 ML/DL (ref 16–23)
OXYHGB MFR BLDA: 95.4 % (ref 94–97)
PCO2 BLDA: 43.2 MM HG (ref 36–44)
PH BLDA: 7.44 [PH] (ref 7.35–7.45)
PO2 BLDA: 82.3 MM HG (ref 75–129)

## 2023-10-06 PROCEDURE — 71250 CT THORAX DX C-: CPT

## 2023-10-06 PROCEDURE — 82805 BLOOD GASES W/O2 SATURATION: CPT

## 2023-10-06 PROCEDURE — G1004 CDSM NDSC: HCPCS

## 2023-10-06 PROCEDURE — 36600 WITHDRAWAL OF ARTERIAL BLOOD: CPT

## 2023-10-06 NOTE — TELEPHONE ENCOUNTER
I spoke to the patient, he states his dog, Martin Paz, has seizures and takes phenobarbital 15 mg twice a day for a very long period of time. I also did make the patient aware he is overdue for a urine drug screen. He will come in on Monday for a urine drug screen, nurse visit scheduled.

## 2023-10-06 NOTE — TELEPHONE ENCOUNTER
----- Message from Zohra November, DO sent at 55/2/9849  6:43 PM EDT -----  See me tomorrow regarding this patient and the questionable prescription for phenobarbital from a . We will need to investigate this.  ----- Message -----  From: Pita Sauceda MD  Sent: 10/5/2023   4:37 PM EDT  To: Corrina Garcia DO; Zohra November, DO    Good afternoon,    You saw this patient earlier today to discuss injection therapy. He is currently on oxycodone and is requesting a higher and more frequent dose. On PDMP review, I did see Phenobarbital filled multiple times, prescribed from a . By any chance did you have a discussion with the patient in regards to this?     Thank You

## 2023-10-09 ENCOUNTER — TELEPHONE (OUTPATIENT)
Dept: FAMILY MEDICINE CLINIC | Facility: CLINIC | Age: 68
End: 2023-10-09

## 2023-10-09 ENCOUNTER — CLINICAL SUPPORT (OUTPATIENT)
Dept: FAMILY MEDICINE CLINIC | Facility: CLINIC | Age: 68
End: 2023-10-09

## 2023-10-09 ENCOUNTER — TELEPHONE (OUTPATIENT)
Dept: RADIOLOGY | Facility: MEDICAL CENTER | Age: 68
End: 2023-10-09

## 2023-10-09 DIAGNOSIS — G89.29 CHRONIC NECK PAIN: ICD-10-CM

## 2023-10-09 DIAGNOSIS — G89.4 CHRONIC PAIN SYNDROME: ICD-10-CM

## 2023-10-09 DIAGNOSIS — F11.20 CONTINUOUS OPIOID DEPENDENCE (HCC): ICD-10-CM

## 2023-10-09 DIAGNOSIS — M54.2 CHRONIC NECK PAIN: ICD-10-CM

## 2023-10-09 DIAGNOSIS — F11.20 CONTINUOUS OPIOID DEPENDENCE (HCC): Primary | ICD-10-CM

## 2023-10-09 DIAGNOSIS — M48.02 FORAMINAL STENOSIS OF CERVICAL REGION: Primary | ICD-10-CM

## 2023-10-09 RX ORDER — OXYCODONE AND ACETAMINOPHEN 7.5; 325 MG/1; MG/1
1 TABLET ORAL EVERY 8 HOURS PRN
Qty: 30 TABLET | Refills: 0 | Status: SHIPPED | OUTPATIENT
Start: 2023-10-09 | End: 2023-10-18 | Stop reason: SDUPTHER

## 2023-10-09 NOTE — TELEPHONE ENCOUNTER
Called patient and scheduled Rt C4-C6 MBB #1 on 11/1/23. Reviewed instructions: , NPO 1 hour prior, loose-fitting/comfortable clothes, if ill/fever/infx/abx to call and reschedule. Also pain level at leat 5/10 and refrain from PRN, as-needed pain meds 6h prior. Patient stated verbal understanding.

## 2023-10-09 NOTE — TELEPHONE ENCOUNTER
----- Message from Miguel Ángel Conrad sent at 10/9/2023 11:57 AM EDT -----    ----- Message -----  From: Elza Pereira DO  Sent: 10/5/2023   2:18 PM EDT  To: Maritza Guerrero MD    Thanks very much for clarifying Dr. Ana Hsieh! We will schedule the procedure for him. Veronica, please see note and schedule accordingly. Sae Lorenzo  ----- Message -----  From: Chaz Browning MD  Sent: 10/5/2023  11:12 AM EDT  To: Elza Pereira DO    Hi Dr. Liz Ledesma,    We have a mutual patient [facility] who has severe COPD. He is on azithromycin 3 times a week not due to active infection. We use azithromycin 3 times a week in COPD patients who have frequent hospitalizations for inflammatory purposes. He was telling me that his pain injections can't be given if he is on antibiotics. I think in this case an exception can be made as the TIW azithromycin is purely for antiinflammatory purposes with exacerbation prevention.      Thank you,  Chaz Browning

## 2023-10-15 LAB

## 2023-10-16 ENCOUNTER — TELEPHONE (OUTPATIENT)
Dept: OTHER | Facility: HOSPITAL | Age: 68
End: 2023-10-16

## 2023-10-16 ENCOUNTER — DOCUMENTATION (OUTPATIENT)
Dept: PULMONOLOGY | Facility: CLINIC | Age: 68
End: 2023-10-16

## 2023-10-16 DIAGNOSIS — J43.9 PULMONARY EMPHYSEMA, UNSPECIFIED EMPHYSEMA TYPE (HCC): Primary | ICD-10-CM

## 2023-10-16 DIAGNOSIS — J44.9 COPD, SEVERE (HCC): ICD-10-CM

## 2023-10-16 NOTE — PROGRESS NOTES
Advanced disease but may be a candidate for one of the lower lobes  Please schedule perfusion scan and visit with me

## 2023-10-16 NOTE — TELEPHONE ENCOUNTER
Left VM for patient regarding [facility]  Requested call back

## 2023-10-17 NOTE — PROGRESS NOTES
Patient is going to have his perfusion scan on 10/23 at 1400 and patient is scheduled for the consult on 11/9 in [place]

## 2023-10-18 DIAGNOSIS — M48.02 FORAMINAL STENOSIS OF CERVICAL REGION: ICD-10-CM

## 2023-10-18 DIAGNOSIS — M54.2 CHRONIC NECK PAIN: ICD-10-CM

## 2023-10-18 DIAGNOSIS — G89.29 CHRONIC NECK PAIN: ICD-10-CM

## 2023-10-18 RX ORDER — OXYCODONE AND ACETAMINOPHEN 7.5; 325 MG/1; MG/1
1 TABLET ORAL EVERY 8 HOURS PRN
Qty: 30 TABLET | Refills: 0 | Status: SHIPPED | OUTPATIENT
Start: 2023-10-18

## 2023-10-19 ENCOUNTER — CLINICAL SUPPORT (OUTPATIENT)
Dept: PULMONOLOGY | Age: 68
End: 2023-10-19

## 2023-10-19 DIAGNOSIS — J43.9 PULMONARY EMPHYSEMA, UNSPECIFIED EMPHYSEMA TYPE (HCC): ICD-10-CM

## 2023-10-19 NOTE — PROGRESS NOTES
PULMONARY REHAB ASSESSMENT    Today's date: 2023  Patient name: Rene Ang. : 1955       MRN: [id number]  PCP: Zohra November   Referring Physician: Mary Beth Camrona MD  Pulmonologist: [place] Sarah    Dx:   Encounter Diagnosis   Name Primary? Pulmonary emphysema, unspecified emphysema type ([address])          Date of onset: 18  Cultural needs:     Weight:    Wt Readings from Last 1 Encounters:   10/05/23 57.6 kg (127 lb)      Height:   Ht Readings from Last 1 Encounters:   10/05/23 5' 9" (1.753 m)     Medical History:   Past Medical History:   Diagnosis Date    Abnormal chest x-ray with multiple lung nodules     last assessed: 2012    Anxiety     Arthritis     Bipolar 2 disorder, major depressive episode ([address])     Chronic tension headaches     last assessed: 2012    COPD (chronic obstructive pulmonary disease) ([address])     Depression 1980    Emphysema of lung ([address]) 2017    Noticed from my first ct scan    GERD (gastroesophageal reflux disease)     Not sure of first occurance. Haven’t experienced any recent bouts. Hypertension     Insomnia     Scoliosis     Substance abuse ([address]) 1970    Use of marajuana. Some experiments with cocain, crank and lsd. Physical Limitations: chronic back pain, neck- ruptured disc, sciatica. Fall Risk: Low   Comments: Ambulates with a steady gait with no assist device and Denies a fall in the past 6 months    Oxygen needs:   Rest:  room air  Exercise/physical activity:  room air  Sleep:   room air    Patient has Rx for supplemental O2:  no    Rating of Perceived Dyspnea at rest:  varies/10    Does Pt monitor home SpO2? No - does have pulse ox to spot check but doesn't check regulalry. Usually in high 90's   Average SpO2 at rest:  %   Average SpO2 with ADLs/physical activity:  %    History of Toxic Exposure:  Possible, but long history of smoking.      Use of Rescue Inhaler: Yes:  as needed times per    Trelegy 1x/day    Use of Nebulizer Treatments:  Yes:  2 times per day    Patient practices breathing techniques at home:  yes      Risk Factors   Cholesterol: Yes  Smoking: Recent user, sept 4 th 2023, doing well abstaining  HTN: No  DM: No  Obesity: No   Inactivity: Yes  Stress:  perceived  stress: 5/10   Stressors: family, health   Goals for Stress Management:  managable, PLB    Family History:  Family History   Problem Relation Age of Onset    Stroke Mother     Other Mother         epilepsy     Vision loss Mother     Prostate cancer Father     Dementia Father     Depression Brother     Bipolar disorder Brother     Heart disease Brother        Allergies: Patient has no known allergies. ETOH:   Social History     Substance and Sexual Activity   Alcohol Use Not Currently    Alcohol/week: 1.0 standard drink of alcohol    Types: 1 Glasses of wine per week    Comment: Not even 1glass of wine per week.  Often go for months on end         Current Medications:   Current Outpatient Medications   Medication Sig Dispense Refill    albuterol (2.5 mg/3 mL) 0.083 % nebulizer solution Take 3 mL (2.5 mg total) by nebulization every 6 (six) hours as needed for wheezing or shortness of breath 180 mL 3    azithromycin (ZITHROMAX) 250 mg tablet Take 1 tablet (250 mg total) by mouth 3 (three) times a week 12 tablet 3    Fluticasone-Umeclidin-Vilant (TRELEGY ELLIPTA IN) Inhale      lisinopril (ZESTRIL) 2.5 mg tablet Take 2.5 mg by mouth daily      oxyCODONE-acetaminophen (Percocet) 7.5-325 MG per tablet Take 1 tablet by mouth every 8 (eight) hours as needed for moderate pain or severe pain Max Daily Amount: 3 tablets 30 tablet 0    pantoprazole (PROTONIX) 40 mg tablet TAKE 1 TABLET (40 MG TOTAL) BY MOUTH DAILY BEFORE BREAKFAST WAIT 30 MINUTES BEFORE EATING BREAKFAST AFTER TAKING TABLET 90 tablet 1    predniSONE 20 mg tablet 10 mg Prednisone taper      roflumilast (DALIRESP) 250 MCG tablet Take 1 tablet (250 mcg total) by mouth daily 90 tablet 3    tamsulosin (FLOMAX) 0.4 mg TAKE 1 CAPSULE BY MOUTH EVERY DAY WITH DINNER 90 capsule 2     No current facility-administered medications for this visit. Functional Status Prior to Diagnosis for Treatment   Occupation: retired - manufacturing   Recreation: used to do remodeling,   ADL’s: Capable of performing light ADLs only , bathes and dresses himself. [place]: Capable of performing light ADLs only limited by Dyspnea  Exercise: none  Other:     Patient Specific Goals:  be more independent, increase stamina     Short Term Program Goals: improved energy/stamina with ADLs reduce work of breathing    Long Term Goals: Improved functional capacity based on initial fitness assessment  Abstain from smoking  decreased shortness of breath    Oxygen Goals: Maintain SpO2>88% titrating supplemental oxygen as needed     Ability to reach goals/rehabilitation potential:  Very Good     Projected return to function: 12 weeks  Objective tests: 6 MWT      Nutritional   Reviewed details of Rate your Plate. Discussed key elements of heart healthy eating. Reviewed patient goals for dietary modifications and their clinical implications. Reviewed most recent lipid profile. Goals for dietary modification: low fat ground meat and poultry  low sodium  more nuts/seeds  Healthy weight gain with pb, nuts, shakes etc.  Increasing portion control - gained 5lbs      Emotional/Social  Pt is bipolar. Used to see therapist and was on medication for many years. Has many brothers and has wife. SOCIAL SUPPORT NETWORK    Marital status:       Domestic Violence Screening: No    Comments: motions with arms and sort of walking out of breath, wakes up breathless at times. Trouble sleeping. No oxygen; not qualified. Doesn't stay asleep for long 6 hrs a night. Nov 1st pain management - on oxy for chronic back pain (only for a few months). Will have consult for zephyr valve.

## 2023-10-19 NOTE — PROGRESS NOTES
Pulmonary Rehabilitation Plan of Care   Initial Care Plan      Today's date: 10/19/2023   # of Exercise Sessions Completed: initial eval   Patient name: Satish Barrera. : 1955  Age: 77 y.o. MRN: [id number]  Referring Physician: Blayne Love MD  Pulmonologist: Dr. Osiel Arredondo   Provider: [facility]  Clinician: Karon Mccracken MS, CEP    Dx:    Encounter Diagnosis   Name Primary? Pulmonary emphysema, unspecified emphysema type [facility])      Date of onset: 18      SUMMARY OF PROGRESS:  Today is Gerardo David initial evaluation to begin Pulmonary Rehab for the diagnosis of COPD, severe specifically panlobular and paraseptal emphysema. Patient does have a PFT on file, revealing an FEV1/FVC ratio of 32% and an FEV1 of 21. This is suggestive of very severeobstruction. Since their diagnosis, the patient has been experiencing increased dyspnea, increased sitting time, decreased physical activity, increased fatigue, and weakness. They report dyspnea and fatigue when completing ADLs . The patient currently does not follow a formal exercise program at home. Pt reports the following physical limitations: chronic back and neck pain. He is taking oxycodone for pain management now but has appt with pain management on  to discuss other options. Depression screening using the PHQ-9 interprets the patient's score of 10 10-14 = Moderate Depression. Anxiety screening using the ROSSI-7 interprets the patients score of 9  5-9 = Mild anxiety. When addressed, the patient reports having depression/anxiety. Pt is diagnosed bipolar. No longer attends therapy visits or takes medication. Patient reports sufficient social/emotional support from his brothers and wife. Information to begin using [employer] was provided as well as contact information for counseling through [employer]. PHQ-9 score will be reassessed in 30 days. The patient is a recent user, quit 23, and is doing well abstaining.  He has abstained since quitting. Patient admits to 100% medication compliance. At rest, the patient rated dyspnea varies/10 with SpO2 98% on room air. They will complete an initial 6MWT. Resting  /64. Resting HR 80bpm. Patient will exercise on room air. Education on smoking cessation, oxygen use, breathing techniques, pulmonary anatomy, exercise for the pulmonary patient, healthy eating, stress, and relaxation will be provided. Patient goals include: be more independent, increase stamina, healthy weight gain. Braydon Montiel will attend 24 exercise sessions, 2x/wk for 12-18 weeks beginning 10/25/23. Will progress patient as tolerated over the next 30 days. Medication compliance: Yes   Comments: Pt reports to be compliant with medications    Fall Risk: Low   Comments: Ambulates with a steady gait with no assist device and Denies a fall in the past 6 months    Smoking: Recent user, quit in last 6 months, doing well abstaining    RPD at Rest: varies/10  RPD with Exercise:  varies/10    Assessment of progression of lung disease and functional status:  CAT: 29/40  Shortness of breath questionnaire: 86/120      EXERCISE ASSESSMENT and PLAN    Current Exercise Program in Rehab:       Frequency: 2 days/week   Supplement with home exercise 2+ days/wk as tolerated        Minutes: 30         METS: 2.0-3.0              SpO2: >88%              RPD: 4-6                      HR: RHR+30bpm   RPE: 4-5         Modalities: UBE, NuStep, and Recumbent bike      Exercise Progression 30 Day Goals :    Frequency: 2-3 days/week    Supplement with home exercise 2+ days/wk as tolerated       Minutes: 35         METS: 2.7-3.5              SpO2: >88%              RPD: 4-6                      HR: Rhr+30   RPE: 4-5        Modalities: UBE, NuStep, Recumbent bike, and Room walking     Strength training:   Will be added following 2-3 weeks of monitored exercise sessions   Modalities: Leg Press, Chest Press, Pull Downs, and Lateral Raise    Oxygen needs: Rest:  room air  Exercise/physical activity:  room air  Sleep:   room air    Oxygen Goal: Maintain SpO2>88% during exercise    Home Exercise: none  Education: pursed lipped breathing, benefits of exercise for pulmonary disease, and energy conservation    Goals: reduced dyspnea during exercise , reduced score on CAT, attend pulmonary rehab regularly, reduced pulmonary related hospital readmissions , and increased stamina with ADLs  Progressing:  Reviewed Pt goals and determined plan of care, Will continue to educate and progress as tolerated. Plan: increased strength of respiratory muscles, utilize PLB with physical activity, and practice harmonica therapy    Readiness to change: Preparation:  (Getting ready to change)       NUTRITION ASSESSMENT AND PLAN    Weight control:    Starting weight: 131   Current weight:       Goals: Increase water intake to reduce/thin mucus production Increased body weight  Education: hydration  Progressing:Reviewed Pt goals and determined plan of care, Will continue to educate and progress as tolerated.   Plan: Education class: Reading Food Labels, Education Class: Heart Healthy Eating, drink more water, and add healthy fats and  lean proteins for healthy weight gain  Readiness to change: Preparation:  (Getting ready to change)  and Action:  (Changing behavior)      PSYCHOSOCIAL ASSESSMENT AND PLAN    Emotional:  Depression assessment:  PHQ-9 = 10 10-14 = Moderate Depression            Anxiety measure:  ROSSI-7 = 9 5-9 = Mild anxiety  Self-reported stress level: 5   Social support: Patient reports excellent emotional/social support from brothers and wife    Goals:  Reduce perceived stress to 1-3/10, improved [facility] QOL < 27, improved sleep, and increased energy  Education: benefits of a positive support system, stress management techniques, and benefits of enrolling in [employer]    Progressing:Reviewed Pt goals and determined plan of care, Will continue to educate and progress as tolerated. Plan: Class: Stress and Your Health, Class: Relaxation, Enroll in [employer], Practice relaxation techniques, and Repeat PHQ-9 every 30 days if score >5  Readiness to change: Preparation:  (Getting ready to change)       OTHER CORE COMPONENTS     Tobacco:   Social History     Tobacco Use   Smoking Status Former    Packs/day: 1.00    Years: 48.00    Total pack years: 48.00    Types: Cigarettes    Start date: 1969    Quit date: 2023    Years since quittin.1   Smokeless Tobacco Never   Tobacco Comments    Quit about 1 month ago       Tobacco Use Intervention: Referral to tobacco expert:   Pt quit 23   and has abstained    Blood pressure:    Restin/64   Exercise:     Goals: medication compliance, Abstain from smoking, and improved tolerance to exertion  Education:  pathophysiology of pulmonary disease, inspiratory muscle training, and harmonica therapy  Progressing:Reviewed Pt goals and determined plan of care, Will continue to educate and progress as tolerated.   Plan: medication compliance, Complete abstention from smoking, avoid places with second hand smoke, engage in regular exercise, and home harmonica therapy practice  Readiness to change: Preparation:  (Getting ready to change)  and Action:  (Changing behavior)

## 2023-10-23 ENCOUNTER — HOSPITAL ENCOUNTER (OUTPATIENT)
Dept: RADIOLOGY | Facility: HOSPITAL | Age: 68
Discharge: HOME | End: 2023-10-23
Attending: INTERNAL MEDICINE
Payer: MEDICARE

## 2023-10-23 DIAGNOSIS — J44.9 COPD, SEVERE (HCC): ICD-10-CM

## 2023-10-23 PROCEDURE — 78830 RP LOCLZJ TUM SPECT W/CT 1: CPT

## 2023-10-23 PROCEDURE — 78597 LUNG PERFUSION DIFFERENTIAL: CPT

## 2023-10-23 PROCEDURE — A9540 TC99M MAA: HCPCS

## 2023-10-25 ENCOUNTER — CLINICAL SUPPORT (OUTPATIENT)
Dept: PULMONOLOGY | Age: 68
End: 2023-10-25
Payer: MEDICARE

## 2023-10-25 DIAGNOSIS — J44.9 CHRONIC OBSTRUCTIVE PULMONARY DISEASE, UNSPECIFIED COPD TYPE (HCC): Primary | ICD-10-CM

## 2023-10-25 DIAGNOSIS — J43.9 PULMONARY EMPHYSEMA, UNSPECIFIED EMPHYSEMA TYPE (HCC): Primary | ICD-10-CM

## 2023-10-25 PROCEDURE — 94625 PHY/QHP OP PULM RHB W/O MNTR: CPT

## 2023-10-25 RX ORDER — FLUTICASONE FUROATE, UMECLIDINIUM BROMIDE AND VILANTEROL TRIFENATATE 200; 62.5; 25 UG/1; UG/1; UG/1
1 POWDER RESPIRATORY (INHALATION) DAILY
Qty: 60 BLISTER | Refills: 5 | Status: SHIPPED | OUTPATIENT
Start: 2023-10-25 | End: 2023-11-24

## 2023-10-27 ENCOUNTER — TELEPHONE (OUTPATIENT)
Dept: PULMONOLOGY | Facility: CLINIC | Age: 68
End: 2023-10-27

## 2023-10-27 NOTE — TELEPHONE ENCOUNTER
Please let patient know maybe he need to change/clean tubing moth piece ect., if not that then may be having some acid reflux

## 2023-10-27 NOTE — TELEPHONE ENCOUNTER
Spoke with patient and he explained that for the last 6wks to 2 months on/off has burning in his esophagus. It gets worse when uses his nebulizer. This is not sick call no other symptoms. He uses his nebulizer twice a day. He is unsure why this is happening and would like to know thoughts. Please advise.

## 2023-10-27 NOTE — TELEPHONE ENCOUNTER
Pt calling in C/O burning in his esophagus and stated it is constant and gets worse when he uses his nebulizer so he has stopped using that completely He is asking what Dr. Heather Bello thinks of this.

## 2023-10-30 ENCOUNTER — OFFICE VISIT (OUTPATIENT)
Dept: UROLOGY | Facility: CLINIC | Age: 68
End: 2023-10-30
Payer: MEDICARE

## 2023-10-30 VITALS
SYSTOLIC BLOOD PRESSURE: 110 MMHG | DIASTOLIC BLOOD PRESSURE: 64 MMHG | WEIGHT: 129 LBS | BODY MASS INDEX: 19.11 KG/M2 | HEIGHT: 69 IN | HEART RATE: 98 BPM

## 2023-10-30 DIAGNOSIS — N40.1 BENIGN PROSTATIC HYPERPLASIA WITH NOCTURIA: ICD-10-CM

## 2023-10-30 DIAGNOSIS — G89.29 CHRONIC NECK PAIN: ICD-10-CM

## 2023-10-30 DIAGNOSIS — R31.29 MICROSCOPIC HEMATURIA: ICD-10-CM

## 2023-10-30 DIAGNOSIS — R35.1 BENIGN PROSTATIC HYPERPLASIA WITH NOCTURIA: ICD-10-CM

## 2023-10-30 DIAGNOSIS — R35.1 BPH ASSOCIATED WITH NOCTURIA: Primary | ICD-10-CM

## 2023-10-30 DIAGNOSIS — M54.2 CHRONIC NECK PAIN: ICD-10-CM

## 2023-10-30 DIAGNOSIS — N40.1 BPH ASSOCIATED WITH NOCTURIA: Primary | ICD-10-CM

## 2023-10-30 DIAGNOSIS — N39.41 URGENCY INCONTINENCE: ICD-10-CM

## 2023-10-30 DIAGNOSIS — Z12.5 SCREENING FOR PROSTATE CANCER: ICD-10-CM

## 2023-10-30 DIAGNOSIS — M48.02 FORAMINAL STENOSIS OF CERVICAL REGION: ICD-10-CM

## 2023-10-30 LAB — POST-VOID RESIDUAL VOLUME, ML POC: 15 ML

## 2023-10-30 PROCEDURE — 99204 OFFICE O/P NEW MOD 45 MIN: CPT | Performed by: UROLOGY

## 2023-10-30 PROCEDURE — 51798 US URINE CAPACITY MEASURE: CPT | Performed by: UROLOGY

## 2023-10-30 RX ORDER — TAMSULOSIN HYDROCHLORIDE 0.4 MG/1
0.4 CAPSULE ORAL
Qty: 90 CAPSULE | Refills: 2 | Status: SHIPPED | OUTPATIENT
Start: 2023-10-30

## 2023-10-30 RX ORDER — OXYCODONE AND ACETAMINOPHEN 7.5; 325 MG/1; MG/1
1 TABLET ORAL EVERY 8 HOURS PRN
Qty: 30 TABLET | Refills: 0 | Status: SHIPPED | OUTPATIENT
Start: 2023-10-30

## 2023-10-30 NOTE — PROGRESS NOTES
UROLOGY NEW CONSULT NOTE     CHIEF COMPLAINT   Aden Huber is a 77 y.o. male with a complaint of   Chief Complaint   Patient presents with    Follow-up    Benign Prostatic Hypertrophy       History of Present Illness:   Aden Huber is a 77 y.o. male here for evaluation of prostate enlargement. Patient previously saw Dr. Nupur Elias in 2015. Patient reported poor urinary stream, nocturia at that time. Patient was offered Flomax at that visit. Of note, patient was a significant smoker. Patient has not had routine prostate cancer screening by review of the records. Of note, he did refuse digital rectal exam at his last visit with Dr. Nupur Elias. Patient returns. He has significant stage IV COPD, not currently on oxygen. He follows with pulmonology. Patient's urinary symptoms have worsened with urgency and the significant need to void with minimal output. He fluid restricts due to bathroom issues despite his COPD.   He was recently started on Flomax by his primary care team.    Lab Results   Component Value Date    PSA 1.1 11/24/2021    PSA 1.0 03/10/2018       Urinary Score(s)     AUA SYMPTOM SCORE      Flowsheet Row Most Recent Value   AUA SYMPTOM SCORE    How often have you had a sensation of not emptying your bladder completely after you finished urinating? 1 (P)     How often have you had to urinate again less than two hours after you finished urinating? 5 (P)     How often have you found you stopped and started again several times when you urinate? 2 (P)     How often have you found it difficult to postpone urination? 3 (P)     How often have you had a weak urinary stream? 3 (P)     How often have you had to push or strain to begin urination? 1 (P)     How many times did you most typically get up to urinate from the time you went to bed at night until the time you got up in the morning? 5 (P)     Quality of Life: If you were to spend the rest of your life with your urinary condition just the way it is now, how would you feel about that? 5 (P)     AUA SYMPTOM SCORE 20 (P)            Past Medical History:     Past Medical History:   Diagnosis Date    Abnormal chest x-ray with multiple lung nodules     last assessed: April 25, 2012    Anxiety     Arthritis     Bipolar 2 disorder, major depressive episode ([address])     Chronic tension headaches     last assessed: April 25, 2012    COPD (chronic obstructive pulmonary disease) ([address])     Depression 08/17/1980    Emphysema of lung ([address]) 1/01/2017    Noticed from my first ct scan    GERD (gastroesophageal reflux disease)     Not sure of first occurance. Haven’t experienced any recent bouts. Hypertension     Insomnia     Scoliosis     Substance abuse ([address]) 1/01/1970    Use of marajuana. Some experiments with cocain, crank and lsd.        PAST SURGICAL HISTORY:     Past Surgical History:   Procedure Laterality Date    CHOLECYSTECTOMY OPEN  01/2021    COLONOSCOPY      1-2016 [facility]    TONSILLECTOMY         CURRENT MEDICATIONS:     Current Outpatient Medications   Medication Sig Dispense Refill    albuterol (2.5 mg/3 mL) 0.083 % nebulizer solution Take 3 mL (2.5 mg total) by nebulization every 6 (six) hours as needed for wheezing or shortness of breath 180 mL 3    azithromycin (ZITHROMAX) 250 mg tablet Take 1 tablet (250 mg total) by mouth 3 (three) times a week 12 tablet 3    fluticasone-umeclidinium-vilanterol (Trelegy Ellipta) 200-62.5-25 mcg/actuation AEPB inhaler Inhale 1 puff daily 60 blister 5    lisinopril (ZESTRIL) 2.5 mg tablet Take 2.5 mg by mouth daily      oxyCODONE-acetaminophen (Percocet) 7.5-325 MG per tablet Take 1 tablet by mouth every 8 (eight) hours as needed for moderate pain or severe pain Max Daily Amount: 3 tablets 30 tablet 0    pantoprazole (PROTONIX) 40 mg tablet TAKE 1 TABLET (40 MG TOTAL) BY MOUTH DAILY BEFORE BREAKFAST WAIT 30 MINUTES BEFORE EATING BREAKFAST AFTER TAKING TABLET 90 tablet 1    tamsulosin (FLOMAX) 0.4 mg Take 1 capsule (0.4 mg total) by mouth daily with dinner 90 capsule 2    predniSONE 20 mg tablet 10 mg Prednisone taper (Patient not taking: Reported on 10/30/2023)      roflumilast (DALIRESP) 250 MCG tablet Take 1 tablet (250 mcg total) by mouth daily 90 tablet 3     No current facility-administered medications for this visit. ALLERGIES:   No Known Allergies    SOCIAL HISTORY:     Social History     Socioeconomic History    Marital status: /Civil Union     Spouse name: None    Number of children: None    Years of education: None    Highest education level: None   Occupational History    Occupation: retired    Tobacco Use    Smoking status: Former     Packs/day: 1.00     Years: 48.00     Total pack years: 48.00     Types: Cigarettes     Start date: 1969     Quit date: 2023     Years since quittin.1    Smokeless tobacco: Never    Tobacco comments:     Quit about 1 month ago   Vaping Use    Vaping Use: Never used   Substance and Sexual Activity    Alcohol use: Not Currently     Alcohol/week: 1.0 standard drink of alcohol     Types: 1 Glasses of wine per week     Comment: Not even 1glass of wine per week.  Often go for months on end    Drug use: Not Currently     Types: Cocaine, Hashish, LSD, Marijuana    Sexual activity: Not Currently     Partners: Female     Birth control/protection: Male Sterilization   Other Topics Concern    None   Social History Narrative    None     Social Determinants of Health     Financial Resource Strain: Not on file   Food Insecurity: Not on file   Transportation Needs: Not on file   Physical Activity: Not on file   Stress: Not on file   Social Connections: Not on file   Intimate Partner Violence: Not on file   Housing Stability: Not on file       SOCIAL HISTORY:     Family History   Problem Relation Age of Onset    Stroke Mother     Other Mother         epilepsy     Vision loss Mother     Prostate cancer Father     Dementia Father     Depression Brother     Bipolar disorder Brother     Heart disease Brother        REVIEW OF SYSTEMS:     Review of Systems   Constitutional:  Negative for chills and fever. HENT:  Negative for ear pain and sore throat. Eyes:  Negative for pain and visual disturbance. Respiratory:  Positive for shortness of breath. Negative for cough. Cardiovascular:  Negative for chest pain and palpitations. Gastrointestinal:  Negative for abdominal pain and vomiting. Genitourinary:  Positive for difficulty urinating, frequency and urgency. Negative for dysuria and hematuria. Musculoskeletal:  Negative for arthralgias and back pain. Skin:  Negative for color change and rash. Neurological:  Negative for seizures and syncope. All other systems reviewed and are negative. PHYSICAL EXAM:     /64 (BP Location: Left arm, Patient Position: Sitting, Cuff Size: Adult)   Pulse 98   Ht 5' 9" (1.753 m)   Wt 58.5 kg (129 lb)   BMI 19.05 kg/m²     Physical Exam  Vitals reviewed. Constitutional:       General: He is not in acute distress. Appearance: He is well-developed. He is ill-appearing. HENT:      Head: Normocephalic and atraumatic. Eyes:      Pupils: Pupils are equal, round, and reactive to light. Cardiovascular:      Rate and Rhythm: Normal rate. Pulmonary:      Effort: Respiratory distress present. Abdominal:      General: There is no distension. Palpations: Abdomen is soft. Tenderness: There is no abdominal tenderness. Musculoskeletal:         General: Normal range of motion. Cervical back: Normal range of motion and neck supple. Skin:     General: Skin is warm and dry. Neurological:      Mental Status: He is alert and oriented to person, place, and time.    Psychiatric:         Behavior: Behavior normal.         LABS:     CBC:   Lab Results   Component Value Date    WBC 6.7 11/24/2021    HGB 16.4 11/24/2021    HCT 47.1 11/24/2021    MCV 89 11/24/2021     11/24/2021       BMP:   Lab Results Component Value Date    GLUCOSE 86 06/21/2016    CALCIUM 9.7 06/21/2016     06/21/2016    K 5.6 (H) 11/24/2021    CO2 28 11/24/2021     11/24/2021    BUN 16 11/24/2021    CREATININE 0.90 11/24/2021     [facility]09/29/2023  Component 09/29/2023 09/05/2023 08/09/2023           Glucose, Urine, POC -- -- -- Load older lab results   Ketone, Urine, POC -- -- -- Load older lab results   Nitrite, Urine, POC -- -- -- Load older lab results   Leukocytes, Urine -- -- -- Load older lab results   pH, Urine, POC -- -- -- Load older lab results   Blood, Urine Negative Negative 0.03 Abnormal     Load older lab results   Protein, Urine 10-29 Abnormal     Negative Negative Load older lab results   Specific Gravity, Urine, POC -- -- -- Load older lab results   Specific Gravity, Urine 1.038 High     1.022 1.015    pH, Urine 6.0 7.0 5.5    Glucose, Urine 30-69 Abnormal     Negative Negative    Ketone, Urine Trace Abnormal     Negative 10-39 Abnormal        Leukocytes Esterase Negative Negative Negative    Nitrite, Urine Negative Negative Negative    Comment SEE NOTES SEE NOTES --    RBC/HPF 3-5 -- --    WBC/HPF 0 -- --    Mucous Threads 1+ -- --        IMAGING:     No recent  imaging    PROCEDURE:     Recent Results (from the past 2 hour(s))   POCT Measure PVR    Collection Time: 10/30/23  3:27 PM   Result Value Ref Range    POST-VOID RESIDUAL VOLUME, ML POC 15 mL   ]    ASSESSMENT:     77 y.o. male  with tobacco abuse history, recent microscopic hematuria, need for updated prostate cancer screening    PLAN:     The patient needs a hematuria work-up given his microscopic hematuria on recent testing in September, his severe urinary symptoms and his longstanding history of tobacco abuse. Patient is willing to proceed with CT urogram and cystoscopy in the office.   I would not typically recommend prostate cancer screening given his end-stage pulmonary disease however given his significant urinary symptoms I have recommended a diagnostic PSA and will perform rectal exam at the upcoming visit. Patient is agreeable to proceed.   He will continue on Flomax for the short interval.

## 2023-10-30 NOTE — TELEPHONE ENCOUNTER
Pt has appt scheduled w/ Dr. Yemi Bedoya on November 1 for a procedure at [facility] [employer]

## 2023-10-31 ENCOUNTER — TELEPHONE (OUTPATIENT)
Age: 68
End: 2023-10-31

## 2023-10-31 ENCOUNTER — TELEPHONE (OUTPATIENT)
Dept: SLEEP CENTER | Facility: CLINIC | Age: 68
End: 2023-10-31

## 2023-10-31 DIAGNOSIS — K21.00 GASTROESOPHAGEAL REFLUX DISEASE WITH ESOPHAGITIS, UNSPECIFIED WHETHER HEMORRHAGE: Primary | ICD-10-CM

## 2023-10-31 RX ORDER — PANTOPRAZOLE SODIUM 40 MG/1
40 TABLET, DELAYED RELEASE ORAL DAILY
Qty: 90 TABLET | Refills: 3 | Status: SHIPPED | OUTPATIENT
Start: 2023-10-31

## 2023-10-31 NOTE — TELEPHONE ENCOUNTER
Patient lvm regarding the burning sensation in his esophagus. Patient states that he has been taking Nexium but has not felt any relief. Please advise.

## 2023-10-31 NOTE — TELEPHONE ENCOUNTER
Patient is having some esophageal burning after using nebulizers. I tried calling the patient back but no answer. I called the spouse as well with no answer. Left voicemail.

## 2023-10-31 NOTE — TELEPHONE ENCOUNTER
Caller: Tiffani Smith PT    Doctor: Dr Kadi Gonzales     Reason for call: Pt called in to schedule a procedure     Call back#: [phone]

## 2023-11-01 NOTE — TELEPHONE ENCOUNTER
I called pt about his esophageal burning    Doesn't sound like he has any thrush. NO odynophagia.     I prescribed Pantoprazole 40mg qday and sent to pharmacy    He is seeing GI this Friday    He is holding nebulizers and Trelegy until he sees GI since the burning is worse after nebulizations    He is continuing azithromycin TIW but cannot get Roflumilast since it's too costly

## 2023-11-01 NOTE — TELEPHONE ENCOUNTER
Order placed per Dr. Guille Walker. Left message for patient to call me back DIRECTLY to schedule. Left my DIRECT phone # [phone] on message.

## 2023-11-02 ENCOUNTER — CLINICAL SUPPORT (OUTPATIENT)
Dept: PULMONOLOGY | Age: 68
End: 2023-11-02
Payer: MEDICARE

## 2023-11-02 DIAGNOSIS — J43.9 PULMONARY EMPHYSEMA, UNSPECIFIED EMPHYSEMA TYPE (HCC): Primary | ICD-10-CM

## 2023-11-02 PROCEDURE — 94625 PHY/QHP OP PULM RHB W/O MNTR: CPT

## 2023-11-06 ENCOUNTER — CLINICAL SUPPORT (OUTPATIENT)
Dept: PULMONOLOGY | Age: 68
End: 2023-11-06
Payer: MEDICARE

## 2023-11-06 DIAGNOSIS — J43.9 PULMONARY EMPHYSEMA, UNSPECIFIED EMPHYSEMA TYPE (HCC): Primary | ICD-10-CM

## 2023-11-06 PROCEDURE — 94625 PHY/QHP OP PULM RHB W/O MNTR: CPT

## 2023-11-06 NOTE — PROGRESS NOTES
Pt was wiped out after exercise last week and felt really short of breathe. He states it took him about a day and a half to get his breathing back to baseline. He states he tried the treadmill for the first time and he's unsure if it was the increased workload or weather related as more leaves are falling. [facility] valve consult scheduled for Thursday. Pt not eating prior to exercise and admits he does not drink enough water. We continue to encourage smaller, more frequent meals or supplement with nutritional shakes and consume more water.

## 2023-11-09 ENCOUNTER — HOSPITAL ENCOUNTER (OUTPATIENT)
Dept: CT IMAGING | Facility: HOSPITAL | Age: 68
Discharge: HOME | End: 2023-11-09
Attending: UROLOGY
Payer: MEDICARE

## 2023-11-09 ENCOUNTER — OFFICE VISIT (OUTPATIENT)
Dept: PULMONOLOGY | Facility: CLINIC | Age: 68
End: 2023-11-09
Payer: MEDICARE

## 2023-11-09 VITALS
SYSTOLIC BLOOD PRESSURE: 128 MMHG | TEMPERATURE: 97.6 F | BODY MASS INDEX: 19.26 KG/M2 | WEIGHT: 130 LBS | HEIGHT: 69 IN | HEART RATE: 111 BPM | DIASTOLIC BLOOD PRESSURE: 82 MMHG | OXYGEN SATURATION: 98 %

## 2023-11-09 DIAGNOSIS — N39.41 URGENCY INCONTINENCE: ICD-10-CM

## 2023-11-09 DIAGNOSIS — J44.9 COPD, VERY SEVERE (HCC): Primary | ICD-10-CM

## 2023-11-09 DIAGNOSIS — R31.29 MICROSCOPIC HEMATURIA: ICD-10-CM

## 2023-11-09 PROCEDURE — G1004 CDSM NDSC: HCPCS

## 2023-11-09 PROCEDURE — 99215 OFFICE O/P EST HI 40 MIN: CPT | Performed by: INTERNAL MEDICINE

## 2023-11-09 PROCEDURE — 74178 CT ABD&PLV WO CNTR FLWD CNTR: CPT

## 2023-11-09 RX ADMIN — IOHEXOL 100 ML: 350 INJECTION, SOLUTION INTRAVENOUS at 20:33

## 2023-11-09 NOTE — ASSESSMENT & PLAN NOTE
He has a 45-pack-year smoking history and quit September 2023. He remains committed to staying abstinent.   He understands that if he would start smoking again, the timeline for valves would be pushed back

## 2023-11-09 NOTE — ASSESSMENT & PLAN NOTE
Patient has advanced COPD/emphysema with hyperinflation and remains symptomatic despite optimal medical therapy. He has completed work-up for bronchoscopic lung volume reduction and appears to have 2 potential target lobes, either of the lower lobes. The perfusion to the right lower lobe is most favorable, but upon more detailed review of his imaging the presence of extensive bullous disease on the right increases the risk of pneumothorax. Therefore, we would move forward, targeting left lower lobe as her primary target with right lower lobe as a secondary option. I had a very honest conversation with the patient today regarding the severity of his lung disease making him a high risk procedure. Patient states that his current quality of life is not acceptable and he is willing to take risks, notably risk of pneumothorax. Given the high emphysema destruction scores, we would plan to keep him hospitalized 5 nights for close observation. We will plan to wait until after the new year when he is officially abstinent from cigarettes for 4 months. Additionally, he needs to remain out of the hospital until that time, otherwise valves would not be an option. If he ultimately is unable to have BLVR, transplant would be his only other option.

## 2023-11-09 NOTE — PROGRESS NOTES
Pulmonary Outpatient Consultation Note   Lieutenant Coelho. 77 y.o. male MRN: [id number]  11/9/2023    Referring provider: Dr. Jose D Nunn    Assessment/Plan:      COPD, very severe [facility])  Patient has advanced COPD/emphysema with hyperinflation and remains symptomatic despite optimal medical therapy. He has completed work-up for bronchoscopic lung volume reduction and appears to have 2 potential target lobes, either of the lower lobes. The perfusion to the right lower lobe is most favorable, but upon more detailed review of his imaging the presence of extensive bullous disease on the right increases the risk of pneumothorax. Therefore, we would move forward, targeting left lower lobe as her primary target with right lower lobe as a secondary option. I had a very honest conversation with the patient today regarding the severity of his lung disease making him a high risk procedure. Patient states that his current quality of life is not acceptable and he is willing to take risks, notably risk of pneumothorax. Given the high emphysema destruction scores, we would plan to keep him hospitalized 5 nights for close observation. We will plan to wait until after the new year when he is officially abstinent from cigarettes for 4 months. Additionally, he needs to remain out of the hospital until that time, otherwise valves would not be an option. If he ultimately is unable to have BLVR, transplant would be his only other option. Nicotine dependence in remission  He has a 45-pack-year smoking history and quit September 2023. He remains committed to staying abstinent.   He understands that if he would start smoking again, the timeline for valves would be pushed back      I spent 45 minutes with the patient counseling and coordinating care:  reviewing all available patient data related to procedure, personally reviewing imaging, studies and pulmonary function testing and discussing the procedure of bronchoscopic lung volume reduction. I shared an informational presentation and reviewed data from the LIBERATE trial.  They understand that nearly 50% of patients will have a 15% improvement in FEV1. Patients will generally have improved exercise tolerance and quality of life related to COPD after treatment. We also discussed the risks, notably a 30% risk of pneumothorax in the postprocedure period. Additional risks include COPD exacerbation, pneumonia and increased supplemental oxygen needs. Patient will require a minimum of a 6 day / 5 night hospitalization to monitor for postprocedure complications. Patient also provided with literature and web site resource regarding [place] valve treatment    Visit orders:    Diagnoses and all orders for this visit:    COPD, very severe ([address])        History of Present Illness   HPI:  Sandy Cueto is a 77 y.o. male who is here today to discuss bronchoscopic lung volume reduction. He has a several year history of COPD with progressive decline in his pulmonary status over the past 1-2 years. He has been taking Trelegy Ellipta once daily but does not find any real benefit from it. He is enrolled in pulmonary rehabilitation, but finds it difficult to get to both sessions each week. He has shortness of breath with even minimal exertion. He must stop frequently to take rests. He denies significant wheezing. He does not use home oxygen. He has required several hospitalizations this year, most recent at the end of September. Prior to that, he was hospitalized briefly in August and early September. He reports episodes of reflux. He had an appointment with gastroenterology, but canceled it thinking that his symptoms were actually improving. He completed a 14-day course of Nexium and is on Protonix without significant improvement. With the symptoms, he has also scaled back on his inhalers and nebulizer, thinking that perhaps they were exacerbating the problem.     He has slowly lost 15 or so pounds over the past few years. Review of Systems   Constitutional:  Positive for appetite change. Negative for fever. HENT:  Positive for sore throat. Negative for ear pain, postnasal drip, rhinorrhea, sneezing and trouble swallowing. Respiratory:  Positive for shortness of breath. Cardiovascular:  Positive for chest pain. Musculoskeletal:  Positive for myalgias. Neurological:  Negative for headaches. otherwise negative    Historical Information   Past Medical History:   Diagnosis Date    Abnormal chest x-ray with multiple lung nodules     last assessed: 2012    Anxiety     Arthritis     Bipolar 2 disorder, major depressive episode ([address])     Chronic tension headaches     last assessed: 2012    COPD (chronic obstructive pulmonary disease) ([address])     Depression 1980    Emphysema of lung ([address]) 2017    Noticed from my first ct scan    GERD (gastroesophageal reflux disease)     Not sure of first occurance. Haven’t experienced any recent bouts. Hypertension     Insomnia     Rheumatoid arthritis ([address]) 10/01/1975    Scoliosis     Substance abuse ([address] [facility]) 1970    Use of marajuana. Some experiments with cocain, crank and lsd.      Past Surgical History:   Procedure Laterality Date    CHOLECYSTECTOMY OPEN  2021    COLONOSCOPY       [facility]    TONSILLECTOMY       Family History   Problem Relation Age of Onset    Stroke Mother     Other Mother         epilepsy     Vision loss Mother     Prostate cancer Father     Dementia Father     Depression Brother     Bipolar disorder Brother     Heart disease Brother        Occupational History: Worked in [employer]    Social History     Tobacco Use   Smoking Status Former    Packs/day: 1.00    Years: 45.00    Total pack years: 45.00    Types: Cigarettes    Start date: 1969    Quit date: 3/15/2023    Years since quittin.6   Smokeless Tobacco Never   Tobacco Comments    Quit about 1 month ago Meds/Allergies     Current Outpatient Medications:     albuterol (2.5 mg/3 mL) 0.083 % nebulizer solution, Take 3 mL (2.5 mg total) by nebulization every 6 (six) hours as needed for wheezing or shortness of breath, Disp: 180 mL, Rfl: 3    azithromycin (ZITHROMAX) 250 mg tablet, Take 1 tablet (250 mg total) by mouth 3 (three) times a week, Disp: 12 tablet, Rfl: 3    fluticasone-umeclidinium-vilanterol (Trelegy Ellipta) 200-62.5-25 mcg/actuation AEPB inhaler, Inhale 1 puff daily, Disp: 60 blister, Rfl: 5    oxyCODONE-acetaminophen (Percocet) 7.5-325 MG per tablet, Take 1 tablet by mouth every 8 (eight) hours as needed for moderate pain or severe pain Max Daily Amount: 3 tablets, Disp: 30 tablet, Rfl: 0    pantoprazole (PROTONIX) 40 mg tablet, Take 1 tablet (40 mg total) by mouth daily, Disp: 90 tablet, Rfl: 3    tamsulosin (FLOMAX) 0.4 mg, Take 1 capsule (0.4 mg total) by mouth daily with dinner, Disp: 90 capsule, Rfl: 2  No Known Allergies    Vitals: Blood pressure 128/82, pulse (!) 111, temperature 97.6 °F (36.4 °C), temperature source Tympanic, height 5' 9" (1.753 m), weight 59 kg (130 lb), SpO2 98 %. , Body mass index is 19.2 kg/m². Oxygen Therapy  SpO2: 98 %  Oxygen Therapy: None (Room air)    Physical Exam   Physical Exam  Constitutional:       General: He is not in acute distress. Appearance: Normal appearance. HENT:      Head: Normocephalic. Mouth/Throat:      Pharynx: No oropharyngeal exudate. Eyes:      General: No scleral icterus. Neck:      Vascular: No JVD. Cardiovascular:      Rate and Rhythm: Normal rate and regular rhythm. Pulmonary:      Breath sounds: No wheezing, rhonchi or rales. Abdominal:      Palpations: Abdomen is soft. Tenderness: There is no abdominal tenderness. Musculoskeletal:         General: No deformity. Cervical back: Neck supple. Lymphadenopathy:      Cervical: No cervical adenopathy. Skin:     General: Skin is warm and dry.    Neurological: Mental Status: He is alert and oriented to person, place, and time. Psychiatric:         Mood and Affect: Mood normal.         Labs: I have personally reviewed pertinent lab results. Lab Results   Component Value Date    WBC 6.7 11/24/2021    HGB 16.4 11/24/2021    HCT 47.1 11/24/2021    MCV 89 11/24/2021     11/24/2021     Lab Results   Component Value Date    GLUCOSE 86 06/21/2016    CALCIUM 9.7 06/21/2016     06/21/2016    K 5.6 (H) 11/24/2021    CO2 28 11/24/2021     11/24/2021    BUN 16 11/24/2021    CREATININE 0.90 11/24/2021     No results found for: "IGE"  Lab Results   Component Value Date    ALT 13 11/24/2021    AST 20 11/24/2021    ALKPHOS 65 06/21/2016    BILITOT <0.2 06/21/2016       Imaging and other studies: I have personally reviewed pertinent reports.    and I have personally reviewed pertinent films in PACS  Chest CT - date 10/6/2023  Severe emphysema with right lower lobe bulla  Stable nodules measuring up to 4 mm in size in the right middle, right lower and left upper lobes -stable going back to 2011    SPECT-CT scan from 10/23/2023  RIGHT LUNG:     RIGHT LUNG COUNTS (%):  Right upper lobe:    23  Right middle lobe:     7  Right lower lobe:   13  Total:                42     RIGHT LUNG VOLUMES (%):  Right upper lobe:   22  Right middle lobe:    4  Right lower lobe:     27  Total:                52     LEFT LUNG:     LEFT LUNG COUNTS (%):  Left upper lobe:    35  Left lower lobe:   23  Total:                58     LEFT LUNG VOLUMES (%):  Left upper lobe:    22  Left lower lobe:   25  Total:                48    Pulmonary function testing:  Performed 10/4/2023  FEV1/FVC ratio 32%    FEV1 21% predicted  FVC 50% predicted  No response to bronchodilators  Post BD FEV1 20 percent predicted   % predicted   % predicted  DLCO corrected for hemoglobin 33 % predicted  PFTs with very severe obstruction, severe hyperinflation and air trapping and severe diffusion impairment    ABG -10/6/2023- pH 7.44 / pCO2 43 / pO2 82    6WMT   Date 10/6/2023  Distance walked 183 m, saturation reached 93% on room air    Pulmonary Rehabilitation -ongoing    Other Studies: I have personally reviewed pertinent reports.       2D echocardiogram -9/5/2023  EF 56-94%, normal diastolic function, no evidence of pulm hypertension            Answers submitted by the patient for this visit:  Pulmonology Questionnaire (Submitted on 11/2/2023)  Chief Complaint: Primary symptoms  Do you have difficulty breathing?: Yes  Do you experience frequent throat clearing?: Yes  Chronicity: chronic  When did you first notice your symptoms?: more than 1 year ago  How often do your symptoms occur?: constantly  Since you first noticed this problem, how has it changed?: unchanged  Do you have shortness of breath that occurs with effort or exertion?: Yes  Do you have ear congestion?: No  Do you have heartburn?: Yes  Do you have fatigue?: Yes  Do you have nasal congestion?: Yes  Do you have shortness of breath when lying flat?: No  Do you have shortness of breath when you wake up?: Yes  Do you have sweats?: No  Have you experienced weight loss?: Yes  Which of the following makes your symptoms worse?: any activity, climbing stairs, eating, emotional stress, exercise, minimal activity, strenuous activity  Which of the following makes your symptoms better?: nothing  Risk factors for lung disease: animal exposure

## 2023-11-09 NOTE — LETTER
November 9, 2023     Christ Jacobson MD  [address] [place] [address]    Patient: Dejan Whatley. YOB: 1955   Date of Visit: 11/9/2023       Dear Dr. Heather Bello: Thank you for referring Praveen Browning to me for evaluation. Below are my notes for this consultation. If you have questions, please do not hesitate to call me. I look forward to following your patient along with you. Sincerely,        Marten Councilman, DO        CC: No Recipients    Marten Councilman, DO  11/9/2023  2:42 PM  Sign when Signing Visit  Pulmonary Outpatient Consultation Note   Dejan Whatley. 77 y.o. male MRN: [id number]  11/9/2023    Referring provider: Dr. Heather Bello    Assessment/Plan:      COPD, very severe [facility])  Patient has advanced COPD/emphysema with hyperinflation and remains symptomatic despite optimal medical therapy. He has completed work-up for bronchoscopic lung volume reduction and appears to have 2 potential target lobes, either of the lower lobes. The perfusion to the right lower lobe is most favorable, but upon more detailed review of his imaging the presence of extensive bullous disease on the right increases the risk of pneumothorax. Therefore, we would move forward, targeting left lower lobe as her primary target with right lower lobe as a secondary option. I had a very honest conversation with the patient today regarding the severity of his lung disease making him a high risk procedure. Patient states that his current quality of life is not acceptable and he is willing to take risks, notably risk of pneumothorax. Given the high emphysema destruction scores, we would plan to keep him hospitalized 5 nights for close observation. We will plan to wait until after the new year when he is officially abstinent from cigarettes for 4 months. Additionally, he needs to remain out of the hospital until that time, otherwise valves would not be an option.   If he ultimately is unable to have BLVR, transplant would be his only other option. Nicotine dependence in remission  He has a 45-pack-year smoking history and quit September 2023. He remains committed to staying abstinent. He understands that if he would start smoking again, the timeline for valves would be pushed back      I spent 45 minutes with the patient counseling and coordinating care:  reviewing all available patient data related to procedure, personally reviewing imaging, studies and pulmonary function testing and discussing the procedure of bronchoscopic lung volume reduction. I shared an informational presentation and reviewed data from the LIBERATE trial.  They understand that nearly 50% of patients will have a 15% improvement in FEV1. Patients will generally have improved exercise tolerance and quality of life related to COPD after treatment. We also discussed the risks, notably a 30% risk of pneumothorax in the postprocedure period. Additional risks include COPD exacerbation, pneumonia and increased supplemental oxygen needs. Patient will require a minimum of a 6 day / 5 night hospitalization to monitor for postprocedure complications. Patient also provided with literature and web site resource regarding [place] valve treatment    Visit orders:    Diagnoses and all orders for this visit:    COPD, very severe ([address])        History of Present Illness  HPI:  Killian Franco is a 77 y.o. male who is here today to discuss bronchoscopic lung volume reduction. He has a several year history of COPD with progressive decline in his pulmonary status over the past 1-2 years. He has been taking Trelegy Ellipta once daily but does not find any real benefit from it. He is enrolled in pulmonary rehabilitation, but finds it difficult to get to both sessions each week. He has shortness of breath with even minimal exertion. He must stop frequently to take rests. He denies significant wheezing. He does not use home oxygen.     He has required several hospitalizations this year, most recent at the end of September. Prior to that, he was hospitalized briefly in August and early September. He reports episodes of reflux. He had an appointment with gastroenterology, but canceled it thinking that his symptoms were actually improving. He completed a 14-day course of Nexium and is on Protonix without significant improvement. With the symptoms, he has also scaled back on his inhalers and nebulizer, thinking that perhaps they were exacerbating the problem. He has slowly lost 15 or so pounds over the past few years. Review of Systems   Constitutional:  Positive for appetite change. Negative for fever. HENT:  Positive for sore throat. Negative for ear pain, postnasal drip, rhinorrhea, sneezing and trouble swallowing. Respiratory:  Positive for shortness of breath. Cardiovascular:  Positive for chest pain. Musculoskeletal:  Positive for myalgias. Neurological:  Negative for headaches. otherwise negative    Historical Information  Past Medical History:   Diagnosis Date   • Abnormal chest x-ray with multiple lung nodules     last assessed: April 25, 2012   • Anxiety    • Arthritis    • Bipolar 2 disorder, major depressive episode ([address])    • Chronic tension headaches     last assessed: April 25, 2012   • COPD (chronic obstructive pulmonary disease) ([address])    • Depression 08/17/1980   • Emphysema of lung ([address]) 1/01/2017    Noticed from my first ct scan   • GERD (gastroesophageal reflux disease)     Not sure of first occurance. Haven’t experienced any recent bouts. • Hypertension    • Insomnia    • Rheumatoid arthritis ([address]) 10/01/1975   • Scoliosis    • Substance abuse ([address]) 1/01/1970    Use of marajuana. Some experiments with cocain, crank and lsd.      Past Surgical History:   Procedure Laterality Date   • CHOLECYSTECTOMY OPEN  01/2021   • COLONOSCOPY      1-2016 [facility]   • TONSILLECTOMY       Family History   Problem Relation Age of Onset   • Stroke Mother    • Other Mother         epilepsy    • Vision loss Mother    • Prostate cancer Father    • Dementia Father    • Depression Brother    • Bipolar disorder Brother    • Heart disease Brother        Occupational History: Worked in manufacturing    Social History     Tobacco Use   Smoking Status Former   • Packs/day: 1.00   • Years: 45.00   • Total pack years: 45.00   • Types: Cigarettes   • Start date: 1969   • Quit date: 3/15/2023   • Years since quittin.6   Smokeless Tobacco Never   Tobacco Comments    Quit about 1 month ago       Meds/Allergies    Current Outpatient Medications:   •  albuterol (2.5 mg/3 mL) 0.083 % nebulizer solution, Take 3 mL (2.5 mg total) by nebulization every 6 (six) hours as needed for wheezing or shortness of breath, Disp: 180 mL, Rfl: 3  •  azithromycin (ZITHROMAX) 250 mg tablet, Take 1 tablet (250 mg total) by mouth 3 (three) times a week, Disp: 12 tablet, Rfl: 3  •  fluticasone-umeclidinium-vilanterol (Trelegy Ellipta) 200-62.5-25 mcg/actuation AEPB inhaler, Inhale 1 puff daily, Disp: 60 blister, Rfl: 5  •  oxyCODONE-acetaminophen (Percocet) 7.5-325 MG per tablet, Take 1 tablet by mouth every 8 (eight) hours as needed for moderate pain or severe pain Max Daily Amount: 3 tablets, Disp: 30 tablet, Rfl: 0  •  pantoprazole (PROTONIX) 40 mg tablet, Take 1 tablet (40 mg total) by mouth daily, Disp: 90 tablet, Rfl: 3  •  tamsulosin (FLOMAX) 0.4 mg, Take 1 capsule (0.4 mg total) by mouth daily with dinner, Disp: 90 capsule, Rfl: 2  No Known Allergies    Vitals: Blood pressure 128/82, pulse (!) 111, temperature 97.6 °F (36.4 °C), temperature source Tympanic, height 5' 9" (1.753 m), weight 59 kg (130 lb), SpO2 98 %. , Body mass index is 19.2 kg/m². Oxygen Therapy  SpO2: 98 %  Oxygen Therapy: None (Room air)    Physical Exam   Physical Exam  Constitutional:       General: He is not in acute distress. Appearance: Normal appearance.    HENT:      Head: Normocephalic. Mouth/Throat:      Pharynx: No oropharyngeal exudate. Eyes:      General: No scleral icterus. Neck:      Vascular: No JVD. Cardiovascular:      Rate and Rhythm: Normal rate and regular rhythm. Pulmonary:      Breath sounds: No wheezing, rhonchi or rales. Abdominal:      Palpations: Abdomen is soft. Tenderness: There is no abdominal tenderness. Musculoskeletal:         General: No deformity. Cervical back: Neck supple. Lymphadenopathy:      Cervical: No cervical adenopathy. Skin:     General: Skin is warm and dry. Neurological:      Mental Status: He is alert and oriented to person, place, and time. Psychiatric:         Mood and Affect: Mood normal.         Labs: I have personally reviewed pertinent lab results. Lab Results   Component Value Date    WBC 6.7 11/24/2021    HGB 16.4 11/24/2021    HCT 47.1 11/24/2021    MCV 89 11/24/2021     11/24/2021     Lab Results   Component Value Date    GLUCOSE 86 06/21/2016    CALCIUM 9.7 06/21/2016     06/21/2016    K 5.6 (H) 11/24/2021    CO2 28 11/24/2021     11/24/2021    BUN 16 11/24/2021    CREATININE 0.90 11/24/2021     No results found for: "IGE"  Lab Results   Component Value Date    ALT 13 11/24/2021    AST 20 11/24/2021    ALKPHOS 65 06/21/2016    BILITOT <0.2 06/21/2016       Imaging and other studies: I have personally reviewed pertinent reports.    and I have personally reviewed pertinent films in PACS  Chest CT - date 10/6/2023  Severe emphysema with right lower lobe bulla  Stable nodules measuring up to 4 mm in size in the right middle, right lower and left upper lobes -stable going back to 2011    SPECT-CT scan from 10/23/2023  RIGHT LUNG:     RIGHT LUNG COUNTS (%):  Right upper lobe:    23  Right middle lobe:     7  Right lower lobe:   13  Total:                42     RIGHT LUNG VOLUMES (%):  Right upper lobe:   22  Right middle lobe:    4  Right lower lobe:     27  Total:                52 LEFT LUNG:     LEFT LUNG COUNTS (%):  Left upper lobe:    35  Left lower lobe:   23  Total:                58     LEFT LUNG VOLUMES (%):  Left upper lobe:    22  Left lower lobe:   25  Total:                48    Pulmonary function testing:  Performed 10/4/2023  FEV1/FVC ratio 32%    FEV1 21% predicted  FVC 50% predicted  No response to bronchodilators  Post BD FEV1 20 percent predicted   % predicted   % predicted  DLCO corrected for hemoglobin 33 % predicted  PFTs with very severe obstruction, severe hyperinflation and air trapping and severe diffusion impairment    ABG -10/6/2023- pH 7.44 / pCO2 43 / pO2 82    6WMT   Date 10/6/2023  Distance walked 183 m, saturation reached 93% on room air    Pulmonary Rehabilitation -ongoing    Other Studies: I have personally reviewed pertinent reports.       2D echocardiogram -9/5/2023  EF 21-47%, normal diastolic function, no evidence of pulm hypertension            Answers submitted by the patient for this visit:  Pulmonology Questionnaire (Submitted on 11/2/2023)  Chief Complaint: Primary symptoms  Do you have difficulty breathing?: Yes  Do you experience frequent throat clearing?: Yes  Chronicity: chronic  When did you first notice your symptoms?: more than 1 year ago  How often do your symptoms occur?: constantly  Since you first noticed this problem, how has it changed?: unchanged  Do you have shortness of breath that occurs with effort or exertion?: Yes  Do you have ear congestion?: No  Do you have heartburn?: Yes  Do you have fatigue?: Yes  Do you have nasal congestion?: Yes  Do you have shortness of breath when lying flat?: No  Do you have shortness of breath when you wake up?: Yes  Do you have sweats?: No  Have you experienced weight loss?: Yes  Which of the following makes your symptoms worse?: any activity, climbing stairs, eating, emotional stress, exercise, minimal activity, strenuous activity  Which of the following makes your symptoms better?: nothing  Risk factors for lung disease: animal exposure

## 2023-11-10 DIAGNOSIS — M48.02 FORAMINAL STENOSIS OF CERVICAL REGION: ICD-10-CM

## 2023-11-10 DIAGNOSIS — G89.29 CHRONIC NECK PAIN: ICD-10-CM

## 2023-11-10 DIAGNOSIS — M54.2 CHRONIC NECK PAIN: ICD-10-CM

## 2023-11-10 NOTE — TELEPHONE ENCOUNTER
Patient needs a refill on his medication he will run out over the weekend. He also stated he does have an appointment scheduled with Dr. Irma Ventura with pain management for a procedure to try an d help with his pain.

## 2023-11-11 RX ORDER — OXYCODONE AND ACETAMINOPHEN 7.5; 325 MG/1; MG/1
1 TABLET ORAL EVERY 8 HOURS PRN
Qty: 30 TABLET | Refills: 0 | Status: SHIPPED | OUTPATIENT
Start: 2023-11-11

## 2023-11-14 ENCOUNTER — APPOINTMENT (OUTPATIENT)
Dept: PULMONOLOGY | Age: 68
End: 2023-11-14
Payer: MEDICARE

## 2023-11-14 NOTE — PROGRESS NOTES
Pulmonary Rehabilitation Plan of Care   30 Day Reassessment      Today's date: 2023   # of Exercise Sessions Completed: 3  Patient name: Arun Gaspar. : 1955  Age: 77 y.o. MRN: [id number]  Referring Physician: Hugo Rogel MD  Pulmonologist: Dr. Jelena Mckee   Provider: Lisa Chaney  Clinician: Scarlett Cordoba MS, CEP    Dx:    Encounter Diagnosis   Name Primary? Pulmonary emphysema, unspecified emphysema type ([address]) Yes     Date of onset: 18      SUMMARY OF PROGRESS:  :  Yani Rodriguez is currently hospitalized with a COPD exacerbation and was not available for discussion at the time this note is being written. He has attended 3 sessions  in the past 30 days. The patient tolerates 45 mins of intermittent exercise at 1.0 - 2.0 METs  on room air. A light strength training component will be added in a future exercise session. Tyshawn Lute Resting SpO2 98 - 95% with maintained O2 saturation during exercise 98 - 97%. Will titrate supplemental O2 to maintain SpO2 >90% Resting BP  120/80  with appropriate response to exercise reaching 118/86. RHR 85 ,  ExHR 95. The patient reports dyspnea, chest pain, fatigue, and dizziness during exercise. Rating of perceived dyspnea with exercise 6/10 at max METs. He has not begun a regular exercise program at home. Yani Rodriguez was introduced to the harmonica therapy in pulmonary rehab to encourage the practice of breathing exercises at home. The patient is a recent user, quit 2 months ago, and is doing well abstaining. . Depression screening using the PHQ-9 was not able to be reassessed due to the patient not being at rehab to reassess. Patient is diagnosed bipolar. Patient previously reports excellent social/emotional support from his brothers and wife   Patient attends group educational classes on pulmonary disease. Pursed lipped breathing continues to be demonstrated, practiced, and reinforced with the patient.   Pt will continue to be educated on lifestyle modifications and encouraged to supplement with a home exercise program when the patient is discharged from the hospital.     Today is Rosa Lundberg initial evaluation to begin Pulmonary Rehab for the diagnosis of COPD, severe specifically panlobular and paraseptal emphysema. Patient does have a PFT on file, revealing an FEV1/FVC ratio of 32% and an FEV1 of 21. This is suggestive of very severeobstruction. Since their diagnosis, the patient has been experiencing increased dyspnea, increased sitting time, decreased physical activity, increased fatigue, and weakness. They report dyspnea and fatigue when completing ADLs . The patient currently does not follow a formal exercise program at home. Pt reports the following physical limitations: chronic back and neck pain. He is taking oxycodone for pain management now but has appt with pain management on Nov 1st to discuss other options. Depression screening using the PHQ-9 interprets the patient's score of 10 10-14 = Moderate Depression. Anxiety screening using the ROSSI-7 interprets the patients score of 9  5-9 = Mild anxiety. When addressed, the patient reports having depression/anxiety. Pt is diagnosed bipolar. No longer attends therapy visits or takes medication. Patient reports sufficient social/emotional support from his brothers and wife. Information to begin using [employer] was provided as well as contact information for counseling through [employer]. PHQ-9 score will be reassessed in 30 days. The patient is a recent user, quit 9/4/23, and is doing well abstaining. He has abstained since quitting. Patient admits to 100% medication compliance. At rest, the patient rated dyspnea varies/10 with SpO2 98% on room air. They will complete an initial 6MWT. Resting  /64. Resting HR 80bpm. Patient will exercise on room air.  Education on smoking cessation, oxygen use, breathing techniques, pulmonary anatomy, exercise for the pulmonary patient, healthy eating, stress, and relaxation will be provided. Patient goals include: be more independent, increase stamina, healthy weight gain. Sae Duque will attend 24 exercise sessions, 2x/wk for 12-18 weeks beginning 10/25/23. Will progress patient as tolerated over the next 30 days. Medication compliance: Yes   Comments: Pt reports to be compliant with medications    Fall Risk: Low   Comments: Ambulates with a steady gait with no assist device and Denies a fall in the past 6 months    Smoking: Recent user, quit in last 6 months, doing well abstaining    RPD at Rest: 3/10  RPD with Exercise:  5-6/10    Assessment of progression of lung disease and functional status:  CAT: 29/40  Shortness of breath questionnaire: 86/120      EXERCISE ASSESSMENT and PLAN    Current Exercise Program in Rehab:       Frequency: 2 days/week   Supplement with home exercise 2+ days/wk as tolerated        Minutes: 30         METS: 2.0-3.0              SpO2: 97-98              RPD: 4-6                      HR: RHR+30bpm   RPE: 4-5         Modalities: Treadmill, UBE, NuStep, and Recumbent bike      Exercise Progression 30 Day Goals :    Frequency: 2-3 days/week    Supplement with home exercise 2+ days/wk as tolerated       Minutes: 35-40         METS: 2.7-3.5              SpO2: >88%              RPD: 4-6                      HR: Rhr+30   RPE: 4-5        Modalities: Treadmill, UBE, NuStep, and Recumbent bike     Strength training:   Will be added following 2-3 weeks of monitored exercise sessions   Modalities: Leg Press, Chest Press, Pull Downs, and Lateral Raise    Oxygen needs:   Rest:  room air  Exercise/physical activity:  room air  Sleep:   room air    Oxygen Goal: Maintain SpO2>88% during exercise    Home Exercise: none  Education: pursed lipped breathing, benefits of exercise for pulmonary disease, and energy conservation    Goals: reduced dyspnea during exercise , reduced score on CAT, attend pulmonary rehab regularly, reduced pulmonary related hospital readmissions , and increased stamina with ADLs  Progressing:  Pt has not made progress toward the following goals: reduced dyspnea during exercise , reduced score on CAT, attend pulmonary rehab regularly, reduced pulmonary related hospital readmissions , and increased stamina with ADLs. Plan: increased strength of respiratory muscles, utilize PLB with physical activity, and practice harmonica therapy    Readiness to change: Preparation:  (Getting ready to change)       NUTRITION ASSESSMENT AND PLAN    Weight control:    Starting weight: 131   Current weight:   131    Goals: Increase water intake to reduce/thin mucus production Increased body weight  Education: hydration  Progressing:Pt has not made progress toward the following goals: Increase water intake to reduce/thin mucus production Increased body weight, supplement with nutrition shakes. Plan: Education class: Reading Food Labels, Education Class: Heart Healthy Eating, drink more water, and add healthy fats and  lean proteins for healthy weight gain  Readiness to change: Preparation:  (Getting ready to change)       PSYCHOSOCIAL ASSESSMENT AND PLAN    Emotional:  Depression assessment:  PHQ-9 = 10 10-14 = Moderate Depression            Anxiety measure:  ROSSI-7 = 9 5-9 = Mild anxiety  Self-reported stress level: 5   Social support: Patient reports excellent emotional/social support from brothers and wife    Goals:  Reduce perceived stress to 1-3/10, improved [facility] QOL < 27, improved sleep, and increased energy  Education: benefits of a positive support system, stress management techniques, and benefits of enrolling in [employer]    Progressing:Reviewed Pt goals and determined plan of care, Will continue to educate and progress as tolerated. , Patient is in the hospital at the time of the note, reassessment not available  Plan: Class: Stress and Your Health, Class: Relaxation, Enroll in [employer], Practice relaxation techniques, and Repeat PHQ-9 every 30 days if score >5  Readiness to change: Preparation:  (Getting ready to change)       OTHER CORE COMPONENTS     Tobacco:   Social History     Tobacco Use   Smoking Status Former    Packs/day: 1.00    Years: 45.00    Total pack years: 45.00    Types: Cigarettes    Start date: 1969    Quit date: 3/15/2023    Years since quittin.6   Smokeless Tobacco Never   Tobacco Comments    Quit about 1 month ago       Tobacco Use Intervention: Referral to tobacco expert:   Pt quit 23   and has abstained    Blood pressure:    Restin/80   Exercise: 118/86    Goals: medication compliance, Abstain from smoking, and improved tolerance to exertion  Education:  pathophysiology of pulmonary disease, inspiratory muscle training, and harmonica therapy  Progressing:Pt is progressing and showing improvement  toward the following goals:  medication compliance, Abstain from smoking, and improved tolerance to exertion.     Plan: medication compliance, Complete abstention from smoking, avoid places with second hand smoke, engage in regular exercise, and home harmonica therapy practice  Readiness to change: Action:  (Changing behavior)

## 2023-11-16 ENCOUNTER — APPOINTMENT (OUTPATIENT)
Dept: PULMONOLOGY | Age: 68
End: 2023-11-16
Payer: MEDICARE

## 2023-11-20 ENCOUNTER — APPOINTMENT (OUTPATIENT)
Dept: PULMONOLOGY | Age: 68
End: 2023-11-20
Payer: MEDICARE

## 2023-11-21 ENCOUNTER — APPOINTMENT (OUTPATIENT)
Dept: PULMONOLOGY | Age: 68
End: 2023-11-21
Payer: MEDICARE

## 2023-11-22 ENCOUNTER — APPOINTMENT (OUTPATIENT)
Dept: PULMONOLOGY | Age: 68
End: 2023-11-22
Payer: MEDICARE

## 2023-11-24 ENCOUNTER — TELEPHONE (OUTPATIENT)
Dept: FAMILY MEDICINE CLINIC | Facility: CLINIC | Age: 68
End: 2023-11-24

## 2023-11-24 ENCOUNTER — TELEPHONE (OUTPATIENT)
Dept: PULMONOLOGY | Facility: CLINIC | Age: 68
End: 2023-11-24

## 2023-11-24 DIAGNOSIS — M48.02 FORAMINAL STENOSIS OF CERVICAL REGION: ICD-10-CM

## 2023-11-24 DIAGNOSIS — U07.1 COVID-19 VIRUS INFECTION: Primary | ICD-10-CM

## 2023-11-24 DIAGNOSIS — M54.2 CHRONIC NECK PAIN: ICD-10-CM

## 2023-11-24 DIAGNOSIS — G89.29 CHRONIC NECK PAIN: ICD-10-CM

## 2023-11-24 RX ORDER — OXYCODONE AND ACETAMINOPHEN 7.5; 325 MG/1; MG/1
1 TABLET ORAL EVERY 8 HOURS PRN
Qty: 30 TABLET | Refills: 0 | OUTPATIENT
Start: 2023-11-24

## 2023-11-24 RX ORDER — NIRMATRELVIR AND RITONAVIR 300-100 MG
3 KIT ORAL 2 TIMES DAILY
Qty: 30 TABLET | Refills: 0 | Status: SHIPPED | OUTPATIENT
Start: 2023-11-24 | End: 2023-11-29

## 2023-11-24 NOTE — TELEPHONE ENCOUNTER
Called patient and left a voicemail. Given recent hospitalization and his severe COPD, I sent Paxlovid to the pharmacy for him to start. I did ask him to call us back if any issues.

## 2023-11-24 NOTE — TELEPHONE ENCOUNTER
PT calling in stating he just got out of [facility] for COPD exacerbation. He just had a   Positive at home COVID test today,  C/O Runny nose, cough, clear mucus, because of his recent hospitilaztion and COPD he is asking what Dr. Sudarshan Goff wants him to do.

## 2023-11-25 NOTE — TELEPHONE ENCOUNTER
Patient is calling in stating that he is out of his medication and is having pain and unable to sleep.     He states he is completely out of medication

## 2023-11-27 ENCOUNTER — APPOINTMENT (OUTPATIENT)
Dept: PULMONOLOGY | Age: 68
End: 2023-11-27
Payer: MEDICARE

## 2023-11-27 DIAGNOSIS — M54.2 CHRONIC NECK PAIN: ICD-10-CM

## 2023-11-27 DIAGNOSIS — G89.29 CHRONIC NECK PAIN: ICD-10-CM

## 2023-11-27 DIAGNOSIS — M48.02 FORAMINAL STENOSIS OF CERVICAL REGION: ICD-10-CM

## 2023-11-27 NOTE — TELEPHONE ENCOUNTER
Patient called in stating that he requested a refill for Oxy on 11/24/23 and that he got a message stating that it got denied. Patient was upset saying that this is the only medication that is helping with his pain. He wanted me to mention that he will be seeing pain management tomorrow but he would still like a refill because he went all weekend without his pain medication?  He stated that that if he can not get one he would like an explanation on why due to the fact he's been taking it and its the only thing that works       Please advise   Thank you

## 2023-11-28 ENCOUNTER — APPOINTMENT (OUTPATIENT)
Dept: PULMONOLOGY | Age: 68
End: 2023-11-28
Payer: MEDICARE

## 2023-11-29 ENCOUNTER — OFFICE VISIT (OUTPATIENT)
Dept: FAMILY MEDICINE CLINIC | Facility: CLINIC | Age: 68
End: 2023-11-29
Payer: MEDICARE

## 2023-11-29 VITALS
OXYGEN SATURATION: 99 % | HEART RATE: 111 BPM | SYSTOLIC BLOOD PRESSURE: 141 MMHG | DIASTOLIC BLOOD PRESSURE: 87 MMHG | WEIGHT: 124.4 LBS | HEIGHT: 69 IN | BODY MASS INDEX: 18.43 KG/M2

## 2023-11-29 DIAGNOSIS — F11.20 CONTINUOUS OPIOID DEPENDENCE (HCC): Primary | ICD-10-CM

## 2023-11-29 DIAGNOSIS — F41.1 GENERALIZED ANXIETY DISORDER: ICD-10-CM

## 2023-11-29 DIAGNOSIS — M54.50 CHRONIC MIDLINE LOW BACK PAIN WITHOUT SCIATICA: ICD-10-CM

## 2023-11-29 DIAGNOSIS — G89.29 CHRONIC BILATERAL LOW BACK PAIN, UNSPECIFIED WHETHER SCIATICA PRESENT: ICD-10-CM

## 2023-11-29 DIAGNOSIS — G89.29 CHRONIC MIDLINE LOW BACK PAIN WITHOUT SCIATICA: ICD-10-CM

## 2023-11-29 DIAGNOSIS — M54.50 CHRONIC BILATERAL LOW BACK PAIN, UNSPECIFIED WHETHER SCIATICA PRESENT: ICD-10-CM

## 2023-11-29 DIAGNOSIS — J43.9 BULLOUS EMPHYSEMA (HCC): ICD-10-CM

## 2023-11-29 DIAGNOSIS — J44.9 COPD, VERY SEVERE (HCC): ICD-10-CM

## 2023-11-29 PROCEDURE — 99214 OFFICE O/P EST MOD 30 MIN: CPT | Performed by: FAMILY MEDICINE

## 2023-11-29 RX ORDER — OXYCODONE AND ACETAMINOPHEN 7.5; 325 MG/1; MG/1
1 TABLET ORAL EVERY 8 HOURS PRN
Qty: 30 TABLET | Refills: 0 | Status: SHIPPED | OUTPATIENT
Start: 2023-11-29

## 2023-11-29 RX ORDER — ASPIRIN 81 MG/1
81 TABLET, CHEWABLE ORAL DAILY
COMMUNITY
Start: 2023-11-17 | End: 2024-11-16

## 2023-11-29 RX ORDER — LOSARTAN POTASSIUM 25 MG/1
12.5 TABLET ORAL DAILY
COMMUNITY
Start: 2023-11-16

## 2023-11-29 RX ORDER — PREDNISONE 10 MG/1
TABLET ORAL
COMMUNITY
Start: 2023-11-16 | End: 2023-12-06

## 2023-11-30 ENCOUNTER — APPOINTMENT (OUTPATIENT)
Dept: PULMONOLOGY | Age: 68
End: 2023-11-30
Payer: MEDICARE

## 2023-12-06 ENCOUNTER — OFFICE VISIT (OUTPATIENT)
Dept: PULMONOLOGY | Facility: CLINIC | Age: 68
End: 2023-12-06
Payer: MEDICARE

## 2023-12-06 ENCOUNTER — TELEPHONE (OUTPATIENT)
Dept: FAMILY MEDICINE CLINIC | Facility: CLINIC | Age: 68
End: 2023-12-06

## 2023-12-06 VITALS
TEMPERATURE: 96.4 F | RESPIRATION RATE: 18 BRPM | BODY MASS INDEX: 19.26 KG/M2 | SYSTOLIC BLOOD PRESSURE: 110 MMHG | DIASTOLIC BLOOD PRESSURE: 82 MMHG | HEIGHT: 69 IN | OXYGEN SATURATION: 99 % | WEIGHT: 130 LBS | HEART RATE: 84 BPM

## 2023-12-06 DIAGNOSIS — U07.1 COVID-19: ICD-10-CM

## 2023-12-06 DIAGNOSIS — J44.9 COPD, VERY SEVERE (HCC): Primary | ICD-10-CM

## 2023-12-06 DIAGNOSIS — J43.9 BULLOUS EMPHYSEMA (HCC): ICD-10-CM

## 2023-12-06 PROBLEM — I51.81 TAKOTSUBO CARDIOMYOPATHY: Status: ACTIVE | Noted: 2023-09-07

## 2023-12-06 PROBLEM — M54.9 CHRONIC BACK PAIN: Status: ACTIVE | Noted: 2018-02-16

## 2023-12-06 PROCEDURE — 99214 OFFICE O/P EST MOD 30 MIN: CPT | Performed by: INTERNAL MEDICINE

## 2023-12-06 RX ORDER — PREDNISONE 5 MG/1
5 TABLET ORAL DAILY
Qty: 90 TABLET | Refills: 3 | Status: SHIPPED | OUTPATIENT
Start: 2023-12-06

## 2023-12-06 RX ORDER — AZITHROMYCIN 250 MG/1
250 TABLET, FILM COATED ORAL 3 TIMES WEEKLY
Qty: 24 TABLET | Refills: 3 | Status: SHIPPED | OUTPATIENT
Start: 2023-12-06 | End: 2024-07-17

## 2023-12-06 NOTE — PROGRESS NOTES
Assessment/Plan   Patient here today with wife. States he is out of his pain medication and had a horrible weekend. States he is 10 out of 10 pain. I told him if he is 10 out of 10 pain he should be in the emergency room. Was recently just hospitalized for acute exacerbation for hypoxia and COPD exacerbation. Continues to follow with [facility] pulmonary medicine regarding his significant COPD. Recent hospitalization and COVID positive status reviewed. States he does not think inhalers help him. Mention the potential of coordinating with \[facility]\"" for potential lung transplant. Once again reviewed his scans of the neck and previous scans of the lumbar spine. Lumbar spine is really not that bad not to say that it can cause pain but pain seems out of proportion to the actual findings. Not sure how much is psychiatric status plays into this. PDMP checked. Narcotic contract checked. Urine drug screen up-to-date. Refer back to pain management. I will see him 1 more time before I retire. Vaccines discussed. 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[address])      Treatment Plan: Continue current meds and return back to pain management    Treatment Goals: To improve pain on the least amount of medication as best as possible        Subjective     Opioid Management:   Type of visit: Follow-up    Pain related diagnoses: chronic neck, hip, back pain    Screening Tools/Assessments:    PHQ-2/9:  Last PHQ-2 score: 0 (Last PHQ-2 date: 7/5/2023)      Drug Screen:  Date of last drug screen: 10/15/2023    Opioid agreement:  Active Opioid agreement on file?: Yes    Opioid agreement signed date: 6/29/2023  Opioid agreement expiration date: 6/28/2024    Naloxone:  Currently prescribed Naloxone (Narcan): No      Recheck pain.   Ran out of pain medication called late Friday. Meds not refilled per contract    Back Pain  Pertinent negatives include no abdominal pain, chest pain, dysuria, fever, headaches or weakness. Pain Medications               aspirin 81 mg chewable tablet Chew 81 mg daily           Outpatient Morphine Milligram Equivalents Per Day       12/6/23 and after 33.75 MME/Day      Order Name Dose Route Frequency Maximum MME/Day     oxyCODONE-acetaminophen (Percocet) 7.5-325 MG per tablet 1 tablet Oral Every 8 hours PRN 33.75 MME/Day    Total Potential Morphine Milligram Equivalents Per Day 33.75 MME/Day      Calculation Information          oxyCODONE-acetaminophen (Percocet) 7.5-325 MG per tablet    oxyCODONE-acetaminophen 7.5-325 MG Tabs: maximum daily dose of 22.5 mg of opioid in combo * morphine equivalence factor of 1.5 = 33.75 MME/Day                                 PDMP Review         Value Time User    PDMP Reviewed  Yes 11/24/2023  3:01 PM Gaby Tang DO           Review of Systems   Constitutional:  Negative for appetite change, fatigue, fever and unexpected weight change. HENT:  Negative for congestion, ear pain, postnasal drip, rhinorrhea, sinus pressure, sinus pain and sore throat. Eyes:  Negative for redness and visual disturbance. Respiratory:  Negative for chest tightness. Chronic dyspnea on exertion. Improved to some degree since hospitalization   Cardiovascular:  Negative for chest pain, palpitations and leg swelling. Gastrointestinal:  Negative for abdominal distention, abdominal pain, diarrhea and nausea. Endocrine: Negative for cold intolerance and heat intolerance. Genitourinary:  Negative for dysuria and hematuria. Musculoskeletal:  Positive for back pain, gait problem, neck pain and neck stiffness. Skin:  Negative for pallor and rash. Neurological:  Negative for dizziness, tremors, weakness, light-headedness and headaches.    Psychiatric/Behavioral:  Positive for dysphoric mood. Negative for behavioral problems, self-injury and suicidal ideas. The patient is nervous/anxious. Objective     /87 (BP Location: Left arm, Patient Position: Sitting, Cuff Size: Adult)   Pulse (!) 111   Ht 5' 9" (1.753 m)   Wt 56.4 kg (124 lb 6.4 oz)   SpO2 99%   BMI 18.37 kg/m²     Physical Exam  Vitals and nursing note reviewed. Constitutional:       General: He is in acute distress. Appearance: He is well-developed. HENT:      Head: Normocephalic and atraumatic. Eyes:      Conjunctiva/sclera: Conjunctivae normal.   Cardiovascular:      Rate and Rhythm: Normal rate and regular rhythm. Heart sounds: No murmur heard. Comments: Heart rate has returned to the low 100s since first being entered into the office  Pulmonary:      Comments: Bilateral decreased breath sounds  Abdominal:      General: There is no distension. Musculoskeletal:         General: No swelling. Cervical back: Neck supple. Skin:     General: Skin is warm and dry. Capillary Refill: Capillary refill takes less than 2 seconds. Neurological:      General: No focal deficit present. Mental Status: He is alert and oriented to person, place, and time.    Psychiatric:      Comments: Depressed appearing         Oriana Salazar, DO

## 2023-12-06 NOTE — ASSESSMENT & PLAN NOTE
Very severe COPD with emphysema, hyperinflation. Symptomatic at baseline with frequent exacerbations. He notes worse dyspnea after using nebulizers so he has stopped nebulization therapy. He also did not benefit from Trelegy and actually had possible irritation of the airways after inhaling Trelegy. He wants to trial off inhalers and nebulizers at this time because he says he feels better without them    For frequent exacerbations he is on azithromycin 250 mg 3 times weekly. EKG and [facility] did not show elevated Qtc. Roflumilast was too expensive. He has decreased appetite, low BMI, frequent exacerbations. He does benefit from prednisone tapers. Currently we will place him on 5 mg prednisone daily for exacerbation prevention, dyspnea, and potentially increased appetite    Continue workup with Dr. Sirisha Welch for BL VR candidacy  If not a candidate for BL VR, will need referral to lung transplant team to begin the evaluation process.   Tobacco free since September 2023

## 2023-12-06 NOTE — ASSESSMENT & PLAN NOTE
Recent COVID-19 infection in November 2023 associated with some shortness of breath, upper airway symptoms. He is status post 5 days of Paxlovid.   Did not require supplemental oxygen

## 2023-12-06 NOTE — PROGRESS NOTES
Pulmonary Outpatient Follow Up Note   Pau Tinajero. 77 y.o. male MRN: [id number]  12/6/2023      Reason for Consultation:    Chief Complaint   Patient presents with    COPD     Assessment/Plan:    1. COPD, very severe ([address])  Assessment & Plan:  Very severe COPD with emphysema, hyperinflation. Symptomatic at baseline with frequent exacerbations. He notes worse dyspnea after using nebulizers so he has stopped nebulization therapy. He also did not benefit from Trelegy and actually had possible irritation of the airways after inhaling Trelegy. He wants to trial off inhalers and nebulizers at this time because he says he feels better without them    For frequent exacerbations he is on azithromycin 250 mg 3 times weekly. EKG and [facility] did not show elevated Qtc. Roflumilast was too expensive. He has decreased appetite, low BMI, frequent exacerbations. He does benefit from prednisone tapers. Currently we will place him on 5 mg prednisone daily for exacerbation prevention, dyspnea, and potentially increased appetite    Continue workup with Dr. Perla Kaplan for BL VR candidacy  If not a candidate for BL VR, will need referral to lung transplant team to begin the evaluation process. Tobacco free since September 2023    Orders:  -     azithromycin (ZITHROMAX) 250 mg tablet; Take 1 tablet (250 mg total) by mouth 3 (three) times a week Take 2 tablets today then 1 tablet daily x 4 days  -     predniSONE 5 mg tablet; Take 1 tablet (5 mg total) by mouth daily    2. Bullous emphysema (HCC)  Assessment & Plan:  Bullous emphysema. Large bullae on the right lower lobe. No history of pneumothorax      3. COVID-19  Assessment & Plan:  Recent COVID-19 infection in November 2023 associated with some shortness of breath, upper airway symptoms. He is status post 5 days of Paxlovid.   Did not require supplemental oxygen           Health Maintenance  Immunization History   Administered Date(s) Administered    COVID-19 Rebeca Orellana VACC 0.5 ML IM 03/17/2021, 04/14/2021    COVID-19 Moderna mRNA Vaccine 12 Yr+ 50 mcg/0.5 mL (Spikevax) 03/17/2021, 04/14/2021    INFLUENZA 11/13/2012, 12/10/2013, 12/09/2014, 09/17/2015    Pneumococcal Conjugate Vaccine 20-valent (Pcv20), Polysace 07/05/2023    Zoster 10/13/2017        CT Lung Cancer Screening: Next CT July 2024    No follow-ups on file. History of Present Illness   HPI:  Belgica Harevy. is a 77 y.o. male who has a past medical history of benign pulmonary nodules, diverticulosis, BPH, arthritis, depression/bipolar, insomnia, chronic back pain, history of tobacco abuse who is presenting for the follow up of severe emphysema    He continues to have poor quality of life, and inability to do most physical activity. Difficulty with ADLs. Chronic pain requiring oxycodone. He feels like the inhalers and nebulized medications are hurting his lungs more. He feels like the nebulized solutions and inhaled powder irritates his lungs and causes more worsening exacerbations. He had to temporarily stop pulmonary rehab as he was being worn out by it    He had COVID-19 in November 2023 and completed Paxlovid    Currently not on prednisone    Appetite is poor    Recently saw Dr. Gracie Velasquez in pursuit of [facility] evaluation    Pulmonary history   He quit smoking in Sept 2023. 45 years of cigarettes/marijuana. Worsening dyspnea for at least 7 years, now with severe symptoms with exertion. Having frequent exacerbations starting of Summer 2023 with more than 5 hospitalizations    His wife does not smoke. 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 He has always been thin but within the last few years he has lost about 10 lbs.   He only eats about one regular meal a day as a habit from his younger years when he didn't have much money. Denies any mold or smoke in the house. He recently acquired a heat pump with UV light to help with house hygiene. He had the ducts cleaned out. No personal history of cancer or treatment of cancer. Historical Information   Past Medical History:   Diagnosis Date    Abnormal chest x-ray with multiple lung nodules     last assessed: April 25, 2012    Anxiety     Arthritis     Bipolar 2 disorder, major depressive episode ([address])     Chronic tension headaches     last assessed: April 25, 2012    COPD (chronic obstructive pulmonary disease) ([address])     Depression 08/17/1980    Emphysema of lung ([address]) 1/01/2017    Noticed from my first ct scan    GERD (gastroesophageal reflux disease)     Not sure of first occurance. Haven’t experienced any recent bouts. Hypertension     Insomnia     Rheumatoid arthritis ([address]) 10/01/1975    Scoliosis     Substance abuse ([address]) 1/01/1970    Use of marajuana. Some experiments with cocain, crank and lsd.      Past Surgical History:   Procedure Laterality Date    CHOLECYSTECTOMY OPEN  01/2021    COLONOSCOPY      1-2016 [facility]    TONSILLECTOMY       Family History   Problem Relation Age of Onset    Stroke Mother     Other Mother         epilepsy     Vision loss Mother     Prostate cancer Father     Dementia Father     Depression Brother     Bipolar disorder Brother     Heart disease Brother        Meds/Allergies     Current Outpatient Medications:     aspirin 81 mg chewable tablet, Chew 81 mg daily, Disp: , Rfl:     azithromycin (ZITHROMAX) 250 mg tablet, Take 1 tablet (250 mg total) by mouth 3 (three) times a week Take 2 tablets today then 1 tablet daily x 4 days, Disp: 24 tablet, Rfl: 3    losartan (COZAAR) 25 mg tablet, Take 12.5 mg by mouth daily, Disp: , Rfl:     oxyCODONE-acetaminophen (Percocet) 7.5-325 MG per tablet, Take 1 tablet by mouth every 8 (eight) hours as needed for moderate pain or severe pain Max Daily Amount: 3 tablets, Disp: 30 tablet, Rfl: 0    pantoprazole (PROTONIX) 40 mg tablet, Take 1 tablet (40 mg total) by mouth daily, Disp: 90 tablet, Rfl: 3    predniSONE 5 mg tablet, Take 1 tablet (5 mg total) by mouth daily, Disp: 90 tablet, Rfl: 3    tamsulosin (FLOMAX) 0.4 mg, Take 1 capsule (0.4 mg total) by mouth daily with dinner, Disp: 90 capsule, Rfl: 2    fluticasone-umeclidinium-vilanterol (Trelegy Ellipta) 200-62.5-25 mcg/actuation AEPB inhaler, Inhale 1 puff daily, Disp: 60 blister, Rfl: 5  No Known Allergies    Vitals: Blood pressure 110/82, pulse 84, temperature (!) 96.4 °F (35.8 °C), temperature source Tympanic, resp. rate 18, height 5' 9" (1.753 m), weight 59 kg (130 lb), SpO2 99 %. Body mass index is 19.2 kg/m². Oxygen Therapy  SpO2: 99 %  Oxygen Therapy: None (Room air)    Physical Exam  Constitutional:       General: He is not in acute distress. Appearance: He is not ill-appearing, toxic-appearing or diaphoretic. Comments: Thin appearing   HENT:      Head: Normocephalic and atraumatic. Eyes:      General: No scleral icterus. Extraocular Movements: Extraocular movements intact. Cardiovascular:      Rate and Rhythm: Normal rate. Pulmonary:      Effort: Pulmonary effort is normal. No respiratory distress. Breath sounds: No stridor. Comments: Diminished breath sounds. Pursed lip breathing  Musculoskeletal:         General: Normal range of motion. Cervical back: Normal range of motion and neck supple. Right lower leg: No edema. Left lower leg: No edema. Lymphadenopathy:      Cervical: No cervical adenopathy. Skin:     General: Skin is warm. Neurological:      General: No focal deficit present. Mental Status: He is alert and oriented to person, place, and time. Mental status is at baseline. Psychiatric:         Mood and Affect: Mood normal.         Behavior: Behavior normal.         Thought Content: Thought content normal.         Labs:    I have personally reviewed pertinent lab results. ABG:   Lab Results   Component Value Date    PHART 7.440 10/06/2023    RPM4DPG 43.2 10/06/2023    PO2ART 82.3 10/06/2023    VGV0AAY 28.7 (H) 10/06/2023    BEART 4.0 10/06/2023   ,   BNP: No results found for: "BNP",   CBC:  Lab Results   Component Value Date    WBC 6.7 11/24/2021    HGB 16.4 11/24/2021    HCT 47.1 11/24/2021    MCV 89 11/24/2021     11/24/2021    EOSPCT 4 11/24/2021    EOSABS 0.3 11/24/2021    NEUTOPHILPCT 60 11/24/2021    LYMPHOPCT 26 11/24/2021   ,   CMP:   Lab Results   Component Value Date    SODIUM 143 11/24/2021    K 5.6 (H) 11/24/2021     11/24/2021    CO2 28 11/24/2021    ANIONGAP 3 (L) 04/08/2015    BUN 16 11/24/2021    CREATININE 0.90 11/24/2021    GLUCOSE 86 06/21/2016    CALCIUM 9.7 06/21/2016    AST 20 11/24/2021    ALT 13 11/24/2021    ALKPHOS 65 06/21/2016    PROT 6.3 06/21/2016    BILITOT <0.2 06/21/2016   ,   PT/INR: No results found for: "PT", "INR",   Troponin:   Lab Results   Component Value Date    TROPONINI <0.01 06/21/2016       Imaging and other studies: I have personally reviewed pertinent reports. and I have personally reviewed pertinent films in PACS  6/22/2023  Severe emphysema, right lower lobe bleb 8.8 cm. Previously measured in 2021 of similar size. Noncalcified pulmonary nodule 4 mm. Left upper lobe nodule measuring 3 mm without change. Overall pulmonary nodules are stable since 2015 are presumed to be benign. Pulmonary function testing:   Pulmonary Functions Testing Results:  Very severe airflow obstruction. Very severe air trapping and hyperinflation  Significantly reduced diffusion  No need for oxygen during 6-minute walk                 Transthoracic Echo: none      Niki Harrison MD  Pulmonary, Critical Care and Sleep Medicine  [address] [facility]     Portions of the record may have been created with voice recognition software.  Occasional wrong word or "sound a like" substitutions may have occurred due to the inherent limitations of voice recognition software. Please read the chart carefully and recognize, using context, where substitutions have occurred.       Answers submitted by the patient for this visit:  Pulmonology Questionnaire (Submitted on 11/29/2023)  Chief Complaint: Primary symptoms  Do you have a cough?: Yes  Do you experience frequent throat clearing?: Yes  Do you have shortness of breath?: Yes  Chronicity: chronic  When did you first notice your symptoms?: more than 1 year ago  How often do your symptoms occur?: 2 to 4 times per day  Since you first noticed this problem, how has it changed?: unchanged  Have you had a change in appetite?: Yes  Do you have chest pain?: Yes  Do you have shortness of breath that occurs with effort or exertion?: Yes  Do you have ear congestion?: No  Do you have ear pain?: No  Do you have a fever?: No  Do you have headaches?: Yes  Do you have heartburn?: No  Do you have fatigue?: Yes  Do you have muscle pain?: Yes  Do you have nasal congestion?: Yes  Do you have shortness of breath when lying flat?: No  Do you have shortness of breath when you wake up?: Yes  Do you have post-nasal drip?: Yes  Do you have a runny nose?: Yes  Do you have sneezing?: Yes  Do you have a sore throat?: Yes  Do you have sweats?: Yes  Do you have trouble swallowing?: No  Have you experienced weight loss?: Yes  Which of the following makes your symptoms worse?: any activity, climbing stairs, eating, emotional stress, exercise, minimal activity  Which of the following makes your symptoms better?: nothing  Risk factors for lung disease: smoking/tobacco exposure

## 2023-12-07 ENCOUNTER — PROCEDURE VISIT (OUTPATIENT)
Dept: UROLOGY | Facility: CLINIC | Age: 68
End: 2023-12-07
Payer: MEDICARE

## 2023-12-07 ENCOUNTER — DOCUMENTATION (OUTPATIENT)
Dept: CARDIAC REHAB | Age: 68
End: 2023-12-07

## 2023-12-07 VITALS
DIASTOLIC BLOOD PRESSURE: 72 MMHG | HEIGHT: 69 IN | SYSTOLIC BLOOD PRESSURE: 110 MMHG | HEART RATE: 80 BPM | BODY MASS INDEX: 18.51 KG/M2 | OXYGEN SATURATION: 100 % | WEIGHT: 125 LBS

## 2023-12-07 DIAGNOSIS — R31.29 MICROSCOPIC HEMATURIA: Primary | ICD-10-CM

## 2023-12-07 DIAGNOSIS — N40.1 BPH ASSOCIATED WITH NOCTURIA: ICD-10-CM

## 2023-12-07 DIAGNOSIS — R35.1 BPH ASSOCIATED WITH NOCTURIA: ICD-10-CM

## 2023-12-07 PROCEDURE — 88112 CYTOPATH CELL ENHANCE TECH: CPT | Performed by: PATHOLOGY

## 2023-12-07 PROCEDURE — 99214 OFFICE O/P EST MOD 30 MIN: CPT | Performed by: UROLOGY

## 2023-12-07 PROCEDURE — 52000 CYSTOURETHROSCOPY: CPT | Performed by: UROLOGY

## 2023-12-07 RX ORDER — FINASTERIDE 5 MG/1
5 TABLET, FILM COATED ORAL DAILY
Qty: 90 TABLET | Refills: 3 | Status: SHIPPED | OUTPATIENT
Start: 2023-12-07 | End: 2024-12-01

## 2023-12-07 NOTE — PROGRESS NOTES
ASSESSMENT:     77 y.o. male  with tobacco abuse history, recent microscopic hematuria, need for updated prostate cancer screening    PLAN:     Patient CT urogram was reviewed. Cystoscopy today demonstrates mild bladder outlet obstruction primarily within the prostatic urethra. No bladder mucosal abnormality seen. PSA and rectal exam are normal again Depope enlargement on the exam.  Cytology sent from the cystoscopic procedure today. I did offer the patient finasteride or Proscar to assist with treatment of the enlarged prostate and his ongoing urinary symptoms. He is already on Flomax and we discussed the benefit of dual therapy. Patient is not sexually active although sexual side effects reviewed. Will recommend the patient return in 6 months for symptom assessment and assessment of any recurrent bleeding      ---        UROLOGY PROCEDURE NOTE     CHIEF COMPLAINT   Soco Renteria is a 77 y.o. male with a complaint of   Chief Complaint   Patient presents with    Cystoscopy    Microhematuria     PSA 1.36 11/8/23       History of Present Illness:   Soco Renteria is a 77 y.o. male here for evaluation of prostate enlargement. Patient previously saw Dr. London Martínez in 2015. Patient reported poor urinary stream, nocturia at that time. Patient was offered Flomax at that visit. Of note, patient was a significant smoker. Patient has not had routine prostate cancer screening by review of the records. Of note, he did refuse digital rectal exam at his last visit with Dr. London Martínez. Patient returns. He has significant stage IV COPD, not currently on oxygen. He follows with pulmonology. Patient's urinary symptoms have worsened with urgency and the significant need to void with minimal output. He fluid restricts due to bathroom issues despite his COPD.   He was recently started on Flomax by his primary care team.    PSA 11/8/23 - 1.36  Lab Results   Component Value Date    PSA 1.1 11/24/2021    PSA 1.0 03/10/2018       Urinary Score(s)     AUA SYMPTOM SCORE      Flowsheet Row Most Recent Value   AUA SYMPTOM SCORE    How often have you had a sensation of not emptying your bladder completely after you finished urinating? 1 (P)     How often have you had to urinate again less than two hours after you finished urinating? 5 (P)     How often have you found you stopped and started again several times when you urinate? 2 (P)     How often have you found it difficult to postpone urination? 3 (P)     How often have you had a weak urinary stream? 3 (P)     How often have you had to push or strain to begin urination? 1 (P)     How many times did you most typically get up to urinate from the time you went to bed at night until the time you got up in the morning? 5 (P)     Quality of Life: If you were to spend the rest of your life with your urinary condition just the way it is now, how would you feel about that? 5 (P)     AUA SYMPTOM SCORE 20 (P)            Past Medical History:     Past Medical History:   Diagnosis Date    Abnormal chest x-ray with multiple lung nodules     last assessed: April 25, 2012    Anxiety     Arthritis     Bipolar 2 disorder, major depressive episode ([address])     Chronic tension headaches     last assessed: April 25, 2012    COPD (chronic obstructive pulmonary disease) ([address])     Depression 08/17/1980    Emphysema of lung ([address]) 1/01/2017    Noticed from my first ct scan    GERD (gastroesophageal reflux disease)     Not sure of first occurance. Haven’t experienced any recent bouts. Hypertension     Insomnia     Rheumatoid arthritis ([address]) 10/01/1975    Scoliosis     Substance abuse ([address]) 1/01/1970    Use of marajuana. Some experiments with cocain, crank and lsd.        PAST SURGICAL HISTORY:     Past Surgical History:   Procedure Laterality Date    CHOLECYSTECTOMY OPEN  01/2021    COLONOSCOPY      1-2016 [facility]    TONSILLECTOMY         CURRENT MEDICATIONS:     Current Outpatient Medications Medication Sig Dispense Refill    aspirin 81 mg chewable tablet Chew 81 mg daily      azithromycin (ZITHROMAX) 250 mg tablet Take 1 tablet (250 mg total) by mouth 3 (three) times a week Take 2 tablets today then 1 tablet daily x 4 days 24 tablet 3    finasteride (PROSCAR) 5 mg tablet Take 1 tablet (5 mg total) by mouth daily for 360 doses 90 tablet 3    losartan (COZAAR) 25 mg tablet Take 12.5 mg by mouth daily      oxyCODONE-acetaminophen (Percocet) 7.5-325 MG per tablet Take 1 tablet by mouth every 8 (eight) hours as needed for moderate pain or severe pain Max Daily Amount: 3 tablets 30 tablet 0    pantoprazole (PROTONIX) 40 mg tablet Take 1 tablet (40 mg total) by mouth daily 90 tablet 3    predniSONE 5 mg tablet Take 1 tablet (5 mg total) by mouth daily 90 tablet 3    tamsulosin (FLOMAX) 0.4 mg Take 1 capsule (0.4 mg total) by mouth daily with dinner 90 capsule 2     No current facility-administered medications for this visit. ALLERGIES:   No Known Allergies    SOCIAL HISTORY:     Social History     Socioeconomic History    Marital status: /Civil Union     Spouse name: None    Number of children: None    Years of education: None    Highest education level: None   Occupational History    Occupation: retired    Tobacco Use    Smoking status: Former     Packs/day: 1.00     Years: 45.00     Total pack years: 45.00     Types: Cigarettes     Start date: 1969     Quit date: 2023     Years since quittin.2    Smokeless tobacco: Never   Vaping Use    Vaping Use: Never used   Substance and Sexual Activity    Alcohol use: Yes     Alcohol/week: 1.0 standard drink of alcohol     Types: 1 Glasses of wine per week     Comment: Not even 1glass of wine per week.  Often go for months on end    Drug use: Not Currently     Types: Cocaine, Hashish, LSD, Marijuana    Sexual activity: Not Currently     Partners: Female     Birth control/protection: Male Sterilization   Other Topics Concern    None   Social History Narrative    None     Social Determinants of Health     Financial Resource Strain: Not on file   Food Insecurity: Not on file   Transportation Needs: Not on file   Physical Activity: Not on file   Stress: Not on file   Social Connections: Not on file   Intimate Partner Violence: Not on file   Housing Stability: Not on file       SOCIAL HISTORY:     Family History   Problem Relation Age of Onset    Stroke Mother     Other Mother         epilepsy     Vision loss Mother     Prostate cancer Father     Dementia Father     Depression Brother     Bipolar disorder Brother     Heart disease Brother        REVIEW OF SYSTEMS:     Review of Systems   Constitutional:  Negative for chills and fever. HENT:  Negative for ear pain and sore throat. Eyes:  Negative for pain and visual disturbance. Respiratory:  Positive for shortness of breath. Negative for cough. Cardiovascular:  Negative for chest pain and palpitations. Gastrointestinal:  Negative for abdominal pain and vomiting. Genitourinary:  Positive for difficulty urinating, frequency and urgency. Negative for dysuria and hematuria. Musculoskeletal:  Negative for arthralgias and back pain. Skin:  Negative for color change and rash. Neurological:  Negative for seizures and syncope. All other systems reviewed and are negative. PHYSICAL EXAM:     /72 (BP Location: Left arm, Patient Position: Sitting, Cuff Size: Adult)   Pulse 80   Ht 5' 9" (1.753 m)   Wt 56.7 kg (125 lb)   SpO2 100%   BMI 18.46 kg/m²     Physical Exam  Vitals reviewed. Constitutional:       General: He is not in acute distress. Appearance: He is well-developed. He is ill-appearing. HENT:      Head: Normocephalic and atraumatic. Eyes:      Pupils: Pupils are equal, round, and reactive to light. Cardiovascular:      Rate and Rhythm: Normal rate. Pulmonary:      Effort: Respiratory distress present. Abdominal:      General: There is no distension. Palpations: Abdomen is soft. Tenderness: There is no abdominal tenderness. Musculoskeletal:         General: Normal range of motion. Cervical back: Normal range of motion and neck supple. Skin:     General: Skin is warm and dry. Neurological:      Mental Status: He is alert and oriented to person, place, and time. Psychiatric:         Behavior: Behavior normal.         LABS:     CBC:   Lab Results   Component Value Date    WBC 6.7 2021    HGB 16.4 2021    HCT 47.1 2021    MCV 89 2021     2021       BMP:   Lab Results   Component Value Date    GLUCOSE 86 2016    CALCIUM 9.7 2016     2016    K 5.6 (H) 2021    CO2 28 2021     2021    BUN 16 2021    CREATININE 0.90 2021     [id number] [id number]  Component 2023           Glucose, Urine, POC -- -- -- Load older lab results   Ketone, Urine, POC -- -- -- Load older lab results   Nitrite, Urine, POC -- -- -- Load older lab results   Leukocytes, Urine -- -- -- Load older lab results   pH, Urine, POC -- -- -- Load older lab results   Blood, Urine Negative Negative 0.03 Abnormal     Load older lab results   Protein, Urine 10-29 Abnormal     Negative Negative Load older lab results   Specific Gravity, Urine, POC -- -- -- Load older lab results   Specific Gravity, Urine 1.038 High     1.022 1.015    pH, Urine 6.0 7.0 5.5    Glucose, Urine 30-69 Abnormal     Negative Negative    Ketone, Urine Trace Abnormal     Negative 10-39 Abnormal        Leukocytes Esterase Negative Negative Negative    Nitrite, Urine Negative Negative Negative    Comment SEE NOTES SEE NOTES --    RBC/HPF 3-5 -- --    WBC/HPF 0 -- --    Mucous Threads 1+ -- --        IMAGIN/9/23  CT ABDOMEN AND PELVIS WITH AND WITHOUT IV CONTRAST     INDICATION:   R31.29: Other microscopic hematuria  N39.41: Urge incontinence.      COMPARISON: CT abdomen pelvis 4/8/2015. TECHNIQUE: Initial CT of the abdomen and pelvis was performed without intravenous contrast.  Subsequent dynamic CT evaluation of the abdomen and pelvis was performed after the administration of intravenous contrast in both nephrographic and delayed   phases. Multiplanar 2D reformatted images were created from the source data. This examination, like all CT scans performed in the [facility], was performed utilizing techniques to minimize radiation dose exposure, including the use of iterative reconstruction and automated exposure control. Radiation dose length   product (DLP) for this visit:  960.12 mGy-cm     IV Contrast:  100 mL of iohexol (OMNIPAQUE)  Enteric Contrast:  Enteric contrast was not administered. FINDINGS:     ABDOMEN     RIGHT KIDNEY AND URETER:  No solid renal mass. No detectable urothelial mass. Small 1 cm cyst at the right midpole anteriorly. Additional subcentimeter hypodensities, too small to characterize but also likely tiny cysts. No hydronephrosis or hydroureter. No urinary tract calculi. No perinephric collection. LEFT KIDNEY AND URETER:  No solid renal mass. No detectable urothelial mass. No hydronephrosis or hydroureter. No urinary tract calculi. No perinephric collection. URINARY BLADDER:  Underdistended limiting evaluation. No definite masses. No calculi. LOWER CHEST: Bullous changes at the right lung base. LIVER/BILIARY TREE:  One or more subcentimeter sharply circumscribed low-density hepatic lesion(s) are noted, too small to accurately characterize, but statistically most likely to represent subcentimeter hepatic cysts. No suspicious solid hepatic   lesion is identified. Hepatic contours are normal.  No biliary dilatation. GALLBLADDER:  Gallbladder is surgically absent. SPLEEN:  Unremarkable. PANCREAS:  Unremarkable. ADRENAL GLANDS:  Unremarkable.      STOMACH AND BOWEL:  There is colonic diverticulosis without evidence of acute diverticulitis. APPENDIX:  No findings to suggest appendicitis. ABDOMINOPELVIC CAVITY:  No ascites. No free intraperitoneal air. No lymphadenopathy. VESSELS:  Unremarkable for patient's age. PELVIS     REPRODUCTIVE ORGANS: Prostate is moderately enlarged. Symmetric prominence of the bilateral seminal vesicle glands. ABDOMINAL WALL/INGUINAL REGIONS:  There is a small fat-containing umbilical hernia. OSSEOUS STRUCTURES:  No acute fracture or destructive osseous lesion. IMPRESSION:     1. No CT abnormality identified to account for hematuria. 2. Prostatomegaly.     PROCEDURE:     SEE NOTE

## 2023-12-07 NOTE — PROGRESS NOTES
[address] [facility]  Pulmonary Rehabilitation    Patient Sveta Mantilla is being placed on a medical hold at this time for illness. The patient will return to rehab when appropriate and cleared by an advance practitioner. If there are any questions please contact [facility] cardiop[facility] rehab department at [phone].

## 2023-12-07 NOTE — PROGRESS NOTES
Cystoscopy     Date/Time  12/7/2023 8:15 AM     Performed by  Khushboo Davison MD   Authorized by  Khushboo Davison MD     Universal Protocol:  Timeout called at: 12/7/2023 8:21 AM.      Procedure Details:  Procedure type: cystoscopy    Patient tolerance: Patient tolerated the procedure well with no immediate complications    Additional Procedure Details:   Cystoscopy procedure note:  Risk and benefits of flexible cystoscopy were discussed. Informed consent was obtained. Urine dip was adequate for cystoscopy. The patient was placed in the supine position. His genitalia was prepped with Betadine and draped in a sterile fashion. Viscous 2% lidocaine jelly was instilled into the urethra and allowed dwell time for local anesthesia. Flexible cystoscopy was then performed using a 16F scope. The distal urethra and prostatic urethra were evaluated and were normal in course and caliber. Patient had bladder outlet obstruction primarily within the prostatic urethra. Once inside the bladder, it was carefully inspected in a 360 degree fashion. There was no evidence of mucosal abnormalities, lesions, diverticula or stones. Both ureteral orifices in their orthotopic location were visualized with clear efflux of urine. Retroflexion for evaluation of the bladder neck was normal.  Overall this was a negative cystoscopy. Bladder outlet obstruction noted.

## 2023-12-08 ENCOUNTER — TELEPHONE (OUTPATIENT)
Dept: OTHER | Facility: OTHER | Age: 68
End: 2023-12-08

## 2023-12-08 NOTE — TELEPHONE ENCOUNTER
Went over provider note with patient. Advised patient to follow up with Pain management. Patient states that pain management doesn't fill medication for pain and was wondering if Dr. Lauren Geller could prescribe the oxycodone.  Please follow up with patient

## 2023-12-08 NOTE — TELEPHONE ENCOUNTER
Left voicemail message for patient to return call.     Roverto Kebede, DO         Tell the patient and reviewing his recent chart notes obviously he was hospitalized for exacerbation of COPD status post COVID.  Currently he has no follow-up with Dr. Mccord with pain management at [facility].  I would recommend that he contact their office to reconnect and follow-up as I believed may be his MRI that was ordered was denied and to see what the neck step is.      Admission

## 2023-12-11 DIAGNOSIS — G89.29 CHRONIC NECK PAIN: ICD-10-CM

## 2023-12-11 DIAGNOSIS — M48.02 FORAMINAL STENOSIS OF CERVICAL REGION: ICD-10-CM

## 2023-12-11 DIAGNOSIS — M54.2 CHRONIC NECK PAIN: ICD-10-CM

## 2023-12-11 PROCEDURE — 88112 CYTOPATH CELL ENHANCE TECH: CPT | Performed by: PATHOLOGY

## 2023-12-11 RX ORDER — OXYCODONE AND ACETAMINOPHEN 7.5; 325 MG/1; MG/1
1 TABLET ORAL EVERY 8 HOURS PRN
Qty: 30 TABLET | Refills: 0 | Status: SHIPPED | OUTPATIENT
Start: 2023-12-11

## 2024-01-11 ENCOUNTER — OFFICE VISIT (OUTPATIENT)
Dept: PULMONOLOGY | Facility: CLINIC | Age: 69
End: 2024-01-11
Payer: MEDICARE

## 2024-01-11 VITALS
OXYGEN SATURATION: 98 % | DIASTOLIC BLOOD PRESSURE: 76 MMHG | HEIGHT: 78 IN | WEIGHT: 128.7 LBS | BODY MASS INDEX: 14.89 KG/M2 | HEART RATE: 87 BPM | TEMPERATURE: 96.8 F | SYSTOLIC BLOOD PRESSURE: 112 MMHG

## 2024-01-11 DIAGNOSIS — J44.9 COPD, VERY SEVERE (HCC): Primary | ICD-10-CM

## 2024-01-11 DIAGNOSIS — I51.81 TAKOTSUBO CARDIOMYOPATHY: ICD-10-CM

## 2024-01-11 PROCEDURE — 94618 PULMONARY STRESS TESTING: CPT

## 2024-01-11 PROCEDURE — 99215 OFFICE O/P EST HI 40 MIN: CPT | Performed by: INTERNAL MEDICINE

## 2024-01-11 NOTE — ASSESSMENT & PLAN NOTE
Patient was hospitalized in November with Takotsubo, felt to be related to COPD exacerbation.  He had already shown recovery with an EF of 45%.  I would like to update his echocardiogram to ensure ongoing cardiac improvement.

## 2024-01-11 NOTE — LETTER
January 11, 2024     Hesham Davenport MD  [address] [facility]  [place] [address]    Patient: Samuel Flores Jr.   YOB: 1955   Date of Visit: 1/11/2024       Dear Dr. Davenport:    Thank you for referring Samuel Flores to me for evaluation. Below are my notes for this consultation.    If you have questions, please do not hesitate to call me. I look forward to following your patient along with you.         Sincerely,        Lorraine Olivarez, DO        CC: DO Yanely Holguin DO  1/11/2024  3:26 PM  Sign when Signing Visit  Pulmonary Follow Up Note   Samuel Flores Jr. 68 y.o. male MRN: [id number]  1/11/2024      Assessment/Plan:     COPD, very severe (HCC)  Patient has advanced COPD with hyperinflation and remains very symptomatic.  He does not find inhalers or nebulizer therapy to be beneficial.  He would like to move forward with bronchoscopic lung volume reduction.  I again went over the data with him regarding risks and benefits.  He understands that nearly 50% of patients to undergo the procedure will have a 15% improvement in FEV1.  He further understands that there is up to 30% risk of pneumothorax, with additional risks including COPD exacerbation, pneumonia and respiratory failure requiring supplemental oxygen even after hospital discharge.    He would like to move forward.  We will plan for left lower lobe as our target lobe.  Although the right lower lobe could be considered, there is significant bullous disease on the right side, making pneumothorax with persistent air leak a prohibitive possibility.    Unfortunately, he would not be a transplant candidate given his very low BMI, therefore valves appear to be his only option.  We will submit authorization through his insurance company, hoping to move forward with treatment in the coming weeks.  He understands that after valves, he would need to go back to pulmonary rehabilitation.    As it relates to  his support system, he states that his wife does not provide much in the way of support, but he does have a daughter that lives locally and he will discuss the procedure in detail with her as well.    Takotsubo cardiomyopathy  Patient was hospitalized in November with Takotsubo, felt to be related to COPD exacerbation.  He had already shown recovery with an EF of 45%.  I would like to update his echocardiogram to ensure ongoing cardiac improvement.    45 minutes spent with patient today with greater than 50% spent counseling coordinating care: discussing procedure in detail and discussing risks and benefits.    Visit orders:    Diagnoses and all orders for this visit:    COPD, very severe (HCC)  -     POCT 6 minute walk  -     Echo complete w/ contrast if indicated; Future    Takotsubo cardiomyopathy  -     Echo complete w/ contrast if indicated; Future      History of Present Illness  HPI:  Samuel Flores  is a 68 y.o. male who is here today to discuss bronchoscopic lung volume reduction as an option for treatment of advanced COPD.  He has completed the workup and appears to be a candidate with treatment targeting the left lower lobe.  He continues to struggle with shortness of breath with even minimal exertion and feels that his endurance has declined over time.  He was unable to enroll in pulmonary rehabilitation due to significant weakness.  He was hospitalized in November with COPD exacerbation and associated Takotsubo cardiomyopathy with an EF of 20%.  Follow-up echocardiogram showed improvement in EF to 45%.  He has no lower extremity edema.  He has no significant wheezing.  Denies cough or sputum production.  He is no longer using any inhalers or nebulizer therapy, feeling that they made his symptoms worse.  He does not use supplemental oxygen.    Review of Systems   Constitutional:  Positive for appetite change. Negative for fever.   HENT:  Positive for postnasal drip, rhinorrhea and sore throat.  Negative for ear pain, sneezing and trouble swallowing.    Respiratory:  Positive for shortness of breath.    Cardiovascular:  Positive for chest pain.   Musculoskeletal:  Positive for myalgias.   Neurological:  Negative for headaches.    otherwise negative    Medical, Family and Social history reviewed and updated as appropriate    Historical Information  Past Medical History:   Diagnosis Date   • Abnormal chest x-ray with multiple lung nodules     last assessed: 2012   • Anxiety    • Arthritis    • Bipolar 2 disorder, major depressive episode ([facility])    • Chronic tension headaches     last assessed: 2012   • COPD (chronic obstructive pulmonary disease) ([facility])    • Depression 1980   • Emphysema of lung ([facility]) 2017    Noticed from my first ct scan   • GERD (gastroesophageal reflux disease)     Not sure of first occurance. Haven’t experienced any recent bouts.   • Hypertension    • Insomnia    • Rheumatoid arthritis ([facility]) 10/01/1975   • Scoliosis    • Substance abuse ([facility]) 1970    Use of marajuana. Some experiments with cocain, crank and lsd.     Past Surgical History:   Procedure Laterality Date   • CHOLECYSTECTOMY OPEN  2021   • COLONOSCOPY       [facility]   • TONSILLECTOMY       Family History   Problem Relation Age of Onset   • Stroke Mother    • Other Mother         epilepsy    • Vision loss Mother    • Prostate cancer Father    • Dementia Father    • Depression Brother    • Bipolar disorder Brother    • Heart disease Brother        Social History     Tobacco Use   Smoking Status Former   • Current packs/day: 0.00   • Average packs/day: 1 pack/day for 54.0 years (54.0 ttl pk-yrs)   • Types: Cigarettes   • Start date: 1969   • Quit date: 2023   • Years since quittin.3   Smokeless Tobacco Never     Meds/Allergies    Current Outpatient Medications:   •  aspirin 81 mg chewable tablet, Chew 81 mg daily, Disp: , Rfl:   •  azithromycin (ZITHROMAX) 250 mg tablet, Take 1  "tablet (250 mg total) by mouth 3 (three) times a week Take 2 tablets today then 1 tablet daily x 4 days, Disp: 24 tablet, Rfl: 3  •  finasteride (PROSCAR) 5 mg tablet, Take 1 tablet (5 mg total) by mouth daily for 360 doses, Disp: 90 tablet, Rfl: 3  •  losartan (COZAAR) 25 mg tablet, Take 12.5 mg by mouth daily, Disp: , Rfl:   •  oxyCODONE-acetaminophen (Percocet) 7.5-325 MG per tablet, Take 1 tablet by mouth every 8 (eight) hours as needed for moderate pain or severe pain Max Daily Amount: 3 tablets, Disp: 30 tablet, Rfl: 0  •  pantoprazole (PROTONIX) 40 mg tablet, Take 1 tablet (40 mg total) by mouth daily, Disp: 90 tablet, Rfl: 3  •  predniSONE 5 mg tablet, Take 1 tablet (5 mg total) by mouth daily, Disp: 90 tablet, Rfl: 3  •  tamsulosin (FLOMAX) 0.4 mg, Take 1 capsule (0.4 mg total) by mouth daily with dinner, Disp: 90 capsule, Rfl: 2  No Known Allergies    Vitals: Blood pressure 112/76, pulse 87, temperature (!) 96.8 °F (36 °C), temperature source Tympanic, height 6' 9\" (2.057 m), weight 58.4 kg (128 lb 11.2 oz), SpO2 98%. Body mass index is 13.79 kg/m². Oxygen Therapy  SpO2: 98 %  Oxygen Therapy: None (Room air)    Physical Exam   Physical Exam  Constitutional:       General: He is not in acute distress.     Appearance: Normal appearance.   HENT:      Head: Normocephalic.      Mouth/Throat:      Pharynx: No oropharyngeal exudate.   Eyes:      General: No scleral icterus.  Neck:      Vascular: No JVD.   Cardiovascular:      Rate and Rhythm: Normal rate and regular rhythm.   Pulmonary:      Breath sounds: No wheezing, rhonchi or rales.   Musculoskeletal:         General: No deformity.      Cervical back: Neck supple.   Lymphadenopathy:      Cervical: No cervical adenopathy.   Skin:     General: Skin is warm and dry.   Neurological:      Mental Status: He is alert and oriented to person, place, and time.   Psychiatric:         Mood and Affect: Mood normal.         Labs: I have personally reviewed pertinent lab " "results.  Lab Results   Component Value Date    WBC 6.7 11/24/2021    HGB 16.4 11/24/2021    HCT 47.1 11/24/2021    MCV 89 11/24/2021     11/24/2021     Lab Results   Component Value Date    GLUCOSE 86 06/21/2016    CALCIUM 9.7 06/21/2016     06/21/2016    K 5.6 (H) 11/24/2021    CO2 28 11/24/2021     11/24/2021    BUN 16 11/24/2021    CREATININE 0.90 11/24/2021     No results found for: \"IGE\"  Lab Results   Component Value Date    ALT 13 11/24/2021    AST 20 11/24/2021    ALKPHOS 65 06/21/2016    BILITOT <0.2 06/21/2016     Chest CT - date 10/6/2023  Severe emphysema with right lower lobe bulla  Stable nodules measuring up to 4 mm in size in the right middle, right lower and left upper lobes -stable going back to 2011     SPECT-CT scan from 10/23/2023  RIGHT LUNG:     RIGHT LUNG COUNTS (%):  Right upper lobe:    23  Right middle lobe:     7  Right lower lobe:   13  Total:                42     RIGHT LUNG VOLUMES (%):  Right upper lobe:   22  Right middle lobe:    4  Right lower lobe:     27  Total:                52     LEFT LUNG:     LEFT LUNG COUNTS (%):  Left upper lobe:    35  Left lower lobe:   23  Total:                58     LEFT LUNG VOLUMES (%):  Left upper lobe:    22  Left lower lobe:   25  Total:                48     Pulmonary function testing:  Performed 10/4/2023  FEV1/FVC ratio 32%    FEV1 21% predicted  FVC 50% predicted  No response to bronchodilators  Post BD FEV1 20 percent predicted   % predicted   % predicted  DLCO corrected for hemoglobin 33 % predicted  PFTs with very severe obstruction, severe hyperinflation and air trapping and severe diffusion impairment     ABG -10/6/2023- pH 7.44 / pCO2 43 / pO2 82     6WMT   Date 10/6/2023  Distance walked 183 m, saturation reached 93% on room air    6-minute walk test was performed today.  Patient ambulated 126 m with saturations reaching a low of 95%.     Pulmonary Rehabilitation -ongoing     Other Studies: I have " personally reviewed pertinent reports.       2D echocardiogram -9/5/2023  EF 55-60%, normal diastolic function, no evidence of pulm hypertension    Echocardiogram on 11/12/2023 showed an EF of 20% consistent with stress-induced cardiomyopathy    Echocardiogram on 11/16/2023 showed an EF of 40-45%          Answers submitted by the patient for this visit:  Pulmonology Questionnaire (Submitted on 1/4/2024)  Chief Complaint: Primary symptoms  Do you have difficulty breathing?: Yes  Do you experience frequent throat clearing?: Yes  Chronicity: chronic  When did you first notice your symptoms?: more than 1 year ago  How often do your symptoms occur?: constantly  Since you first noticed this problem, how has it changed?: gradually worsening  Do you have shortness of breath that occurs with effort or exertion?: Yes  Do you have ear congestion?: No  Do you have heartburn?: Yes  Do you have fatigue?: Yes  Do you have nasal congestion?: Yes  Do you have shortness of breath when lying flat?: No  Do you have shortness of breath when you wake up?: Yes  Do you have sweats?: No  Have you experienced weight loss?: Yes  Which of the following makes your symptoms worse?: any activity, change in weather, climbing stairs, eating, emotional stress, exercise, exposure to fumes, exposure to smoke, minimal activity, pollen, strenuous activity  Which of the following makes your symptoms better?: nothing  Risk factors for lung disease: animal exposure

## 2024-01-11 NOTE — ASSESSMENT & PLAN NOTE
Patient has advanced COPD with hyperinflation and remains very symptomatic.  He does not find inhalers or nebulizer therapy to be beneficial.  He would like to move forward with bronchoscopic lung volume reduction.  I again went over the data with him regarding risks and benefits.  He understands that nearly 50% of patients to undergo the procedure will have a 15% improvement in FEV1.  He further understands that there is up to 30% risk of pneumothorax, with additional risks including COPD exacerbation, pneumonia and respiratory failure requiring supplemental oxygen even after hospital discharge.    He would like to move forward.  We will plan for left lower lobe as our target lobe.  Although the right lower lobe could be considered, there is significant bullous disease on the right side, making pneumothorax with persistent air leak a prohibitive possibility.    Unfortunately, he would not be a transplant candidate given his very low BMI, therefore valves appear to be his only option.  We will submit authorization through his insurance company, hoping to move forward with treatment in the coming weeks.  He understands that after valves, he would need to go back to pulmonary rehabilitation.    As it relates to his support system, he states that his wife does not provide much in the way of support, but he does have a daughter that lives locally and he will discuss the procedure in detail with her as well.

## 2024-01-11 NOTE — PROGRESS NOTES
Pulmonary Follow Up Note   Samuel Flores . 68 y.o. male MRN: [id number]  1/11/2024      Assessment/Plan:     COPD, very severe (HCC)  Patient has advanced COPD with hyperinflation and remains very symptomatic.  He does not find inhalers or nebulizer therapy to be beneficial.  He would like to move forward with bronchoscopic lung volume reduction.  I again went over the data with him regarding risks and benefits.  He understands that nearly 50% of patients to undergo the procedure will have a 15% improvement in FEV1.  He further understands that there is up to 30% risk of pneumothorax, with additional risks including COPD exacerbation, pneumonia and respiratory failure requiring supplemental oxygen even after hospital discharge.    He would like to move forward.  We will plan for left lower lobe as our target lobe.  Although the right lower lobe could be considered, there is significant bullous disease on the right side, making pneumothorax with persistent air leak a prohibitive possibility.    Unfortunately, he would not be a transplant candidate given his very low BMI, therefore valves appear to be his only option.  We will submit authorization through his insurance company, hoping to move forward with treatment in the coming weeks.  He understands that after valves, he would need to go back to pulmonary rehabilitation.    As it relates to his support system, he states that his wife does not provide much in the way of support, but he does have a daughter that lives locally and he will discuss the procedure in detail with her as well.    Takotsubo cardiomyopathy  Patient was hospitalized in November with Takotsubo, felt to be related to COPD exacerbation.  He had already shown recovery with an EF of 45%.  I would like to update his echocardiogram to ensure ongoing cardiac improvement.    45 minutes spent with patient today with greater than 50% spent counseling coordinating care: discussing procedure in detail  and discussing risks and benefits.    Visit orders:    Diagnoses and all orders for this visit:    COPD, very severe (HCC)  -     POCT 6 minute walk  -     Echo complete w/ contrast if indicated; Future    Takotsubo cardiomyopathy  -     Echo complete w/ contrast if indicated; Future      History of Present Illness   HPI:  Samuel Flores Jr. is a 68 y.o. male who is here today to discuss bronchoscopic lung volume reduction as an option for treatment of advanced COPD.  He has completed the workup and appears to be a candidate with treatment targeting the left lower lobe.  He continues to struggle with shortness of breath with even minimal exertion and feels that his endurance has declined over time.  He was unable to enroll in pulmonary rehabilitation due to significant weakness.  He was hospitalized in November with COPD exacerbation and associated Takotsubo cardiomyopathy with an EF of 20%.  Follow-up echocardiogram showed improvement in EF to 45%.  He has no lower extremity edema.  He has no significant wheezing.  Denies cough or sputum production.  He is no longer using any inhalers or nebulizer therapy, feeling that they made his symptoms worse.  He does not use supplemental oxygen.    Review of Systems   Constitutional:  Positive for appetite change. Negative for fever.   HENT:  Positive for postnasal drip, rhinorrhea and sore throat. Negative for ear pain, sneezing and trouble swallowing.    Respiratory:  Positive for shortness of breath.    Cardiovascular:  Positive for chest pain.   Musculoskeletal:  Positive for myalgias.   Neurological:  Negative for headaches.    otherwise negative    Medical, Family and Social history reviewed and updated as appropriate    Historical Information   Past Medical History:   Diagnosis Date    Abnormal chest x-ray with multiple lung nodules     last assessed: April 25, 2012    Anxiety     Arthritis     Bipolar 2 disorder, major depressive episode (HCC)     Chronic tension  headaches     last assessed: 2012    COPD (chronic obstructive pulmonary disease) ([facility])     Depression 1980    Emphysema of lung ([facility]) 2017    Noticed from my first ct scan    GERD (gastroesophageal reflux disease)     Not sure of first occurance. Haven’t experienced any recent bouts.    Hypertension     Insomnia     Rheumatoid arthritis ([facility]) 10/01/1975    Scoliosis     Substance abuse ([facility]) 1970    Use of marajuana. Some experiments with cocain, crank and lsd.     Past Surgical History:   Procedure Laterality Date    CHOLECYSTECTOMY OPEN  2021    COLONOSCOPY       [facility]    TONSILLECTOMY       Family History   Problem Relation Age of Onset    Stroke Mother     Other Mother         epilepsy     Vision loss Mother     Prostate cancer Father     Dementia Father     Depression Brother     Bipolar disorder Brother     Heart disease Brother        Social History     Tobacco Use   Smoking Status Former    Current packs/day: 0.00    Average packs/day: 1 pack/day for 54.0 years (54.0 ttl pk-yrs)    Types: Cigarettes    Start date: 1969    Quit date: 2023    Years since quittin.3   Smokeless Tobacco Never     Meds/Allergies     Current Outpatient Medications:     aspirin 81 mg chewable tablet, Chew 81 mg daily, Disp: , Rfl:     azithromycin (ZITHROMAX) 250 mg tablet, Take 1 tablet (250 mg total) by mouth 3 (three) times a week Take 2 tablets today then 1 tablet daily x 4 days, Disp: 24 tablet, Rfl: 3    finasteride (PROSCAR) 5 mg tablet, Take 1 tablet (5 mg total) by mouth daily for 360 doses, Disp: 90 tablet, Rfl: 3    losartan (COZAAR) 25 mg tablet, Take 12.5 mg by mouth daily, Disp: , Rfl:     oxyCODONE-acetaminophen (Percocet) 7.5-325 MG per tablet, Take 1 tablet by mouth every 8 (eight) hours as needed for moderate pain or severe pain Max Daily Amount: 3 tablets, Disp: 30 tablet, Rfl: 0    pantoprazole (PROTONIX) 40 mg tablet, Take 1 tablet (40 mg total) by mouth daily,  "Disp: 90 tablet, Rfl: 3    predniSONE 5 mg tablet, Take 1 tablet (5 mg total) by mouth daily, Disp: 90 tablet, Rfl: 3    tamsulosin (FLOMAX) 0.4 mg, Take 1 capsule (0.4 mg total) by mouth daily with dinner, Disp: 90 capsule, Rfl: 2  No Known Allergies    Vitals: Blood pressure 112/76, pulse 87, temperature (!) 96.8 °F (36 °C), temperature source Tympanic, height 6' 9\" (2.057 m), weight 58.4 kg (128 lb 11.2 oz), SpO2 98%. Body mass index is 13.79 kg/m². Oxygen Therapy  SpO2: 98 %  Oxygen Therapy: None (Room air)    Physical Exam   Physical Exam  Constitutional:       General: He is not in acute distress.     Appearance: Normal appearance.   HENT:      Head: Normocephalic.      Mouth/Throat:      Pharynx: No oropharyngeal exudate.   Eyes:      General: No scleral icterus.  Neck:      Vascular: No JVD.   Cardiovascular:      Rate and Rhythm: Normal rate and regular rhythm.   Pulmonary:      Breath sounds: No wheezing, rhonchi or rales.   Musculoskeletal:         General: No deformity.      Cervical back: Neck supple.   Lymphadenopathy:      Cervical: No cervical adenopathy.   Skin:     General: Skin is warm and dry.   Neurological:      Mental Status: He is alert and oriented to person, place, and time.   Psychiatric:         Mood and Affect: Mood normal.         Labs: I have personally reviewed pertinent lab results.  Lab Results   Component Value Date    WBC 6.7 11/24/2021    HGB 16.4 11/24/2021    HCT 47.1 11/24/2021    MCV 89 11/24/2021     11/24/2021     Lab Results   Component Value Date    GLUCOSE 86 06/21/2016    CALCIUM 9.7 06/21/2016     06/21/2016    K 5.6 (H) 11/24/2021    CO2 28 11/24/2021     11/24/2021    BUN 16 11/24/2021    CREATININE 0.90 11/24/2021     No results found for: \"IGE\"  Lab Results   Component Value Date    ALT 13 11/24/2021    AST 20 11/24/2021    ALKPHOS 65 06/21/2016    BILITOT <0.2 06/21/2016     Chest CT - date 10/6/2023  Severe emphysema with right lower lobe " bulla  Stable nodules measuring up to 4 mm in size in the right middle, right lower and left upper lobes -stable going back to 2011     SPECT-CT scan from 10/23/2023  RIGHT LUNG:     RIGHT LUNG COUNTS (%):  Right upper lobe:    23  Right middle lobe:     7  Right lower lobe:   13  Total:                42     RIGHT LUNG VOLUMES (%):  Right upper lobe:   22  Right middle lobe:    4  Right lower lobe:     27  Total:                52     LEFT LUNG:     LEFT LUNG COUNTS (%):  Left upper lobe:    35  Left lower lobe:   23  Total:                58     LEFT LUNG VOLUMES (%):  Left upper lobe:    22  Left lower lobe:   25  Total:                48     Pulmonary function testing:  Performed 10/4/2023  FEV1/FVC ratio 32%    FEV1 21% predicted  FVC 50% predicted  No response to bronchodilators  Post BD FEV1 20 percent predicted   % predicted   % predicted  DLCO corrected for hemoglobin 33 % predicted  PFTs with very severe obstruction, severe hyperinflation and air trapping and severe diffusion impairment     ABG -10/6/2023- pH 7.44 / pCO2 43 / pO2 82     6WMT   Date 10/6/2023  Distance walked 183 m, saturation reached 93% on room air    6-minute walk test was performed today.  Patient ambulated 126 m with saturations reaching a low of 95%.     Pulmonary Rehabilitation -ongoing     Other Studies: I have personally reviewed pertinent reports.       2D echocardiogram -9/5/2023  EF 55-60%, normal diastolic function, no evidence of pulm hypertension    Echocardiogram on 11/12/2023 showed an EF of 20% consistent with stress-induced cardiomyopathy    Echocardiogram on 11/16/2023 showed an EF of 40-45%          Answers submitted by the patient for this visit:  Pulmonology Questionnaire (Submitted on 1/4/2024)  Chief Complaint: Primary symptoms  Do you have difficulty breathing?: Yes  Do you experience frequent throat clearing?: Yes  Chronicity: chronic  When did you first notice your symptoms?: more than 1 year  ago  How often do your symptoms occur?: constantly  Since you first noticed this problem, how has it changed?: gradually worsening  Do you have shortness of breath that occurs with effort or exertion?: Yes  Do you have ear congestion?: No  Do you have heartburn?: Yes  Do you have fatigue?: Yes  Do you have nasal congestion?: Yes  Do you have shortness of breath when lying flat?: No  Do you have shortness of breath when you wake up?: Yes  Do you have sweats?: No  Have you experienced weight loss?: Yes  Which of the following makes your symptoms worse?: any activity, change in weather, climbing stairs, eating, emotional stress, exercise, exposure to fumes, exposure to smoke, minimal activity, pollen, strenuous activity  Which of the following makes your symptoms better?: nothing  Risk factors for lung disease: animal exposure

## 2024-01-18 ENCOUNTER — HOSPITAL ENCOUNTER (OUTPATIENT)
Dept: NON INVASIVE DIAGNOSTICS | Facility: HOSPITAL | Age: 69
Discharge: HOME | End: 2024-01-18
Attending: INTERNAL MEDICINE
Payer: MEDICARE

## 2024-01-18 VITALS
SYSTOLIC BLOOD PRESSURE: 112 MMHG | BODY MASS INDEX: 18.96 KG/M2 | HEIGHT: 69 IN | DIASTOLIC BLOOD PRESSURE: 76 MMHG | HEART RATE: 87 BPM | WEIGHT: 128 LBS

## 2024-01-18 DIAGNOSIS — J44.9 COPD, VERY SEVERE (HCC): ICD-10-CM

## 2024-01-18 DIAGNOSIS — I51.81 TAKOTSUBO CARDIOMYOPATHY: ICD-10-CM

## 2024-01-18 PROCEDURE — 93308 TTE F-UP OR LMTD: CPT

## 2024-01-18 PROCEDURE — 93308 TTE F-UP OR LMTD: CPT | Performed by: INTERNAL MEDICINE

## 2024-01-18 PROCEDURE — 93325 DOPPLER ECHO COLOR FLOW MAPG: CPT | Performed by: INTERNAL MEDICINE

## 2024-01-18 PROCEDURE — 93325 DOPPLER ECHO COLOR FLOW MAPG: CPT

## 2024-01-18 PROCEDURE — 93321 DOPPLER ECHO F-UP/LMTD STD: CPT

## 2024-01-18 PROCEDURE — 93321 DOPPLER ECHO F-UP/LMTD STD: CPT | Performed by: INTERNAL MEDICINE

## 2024-01-19 ENCOUNTER — TELEPHONE (OUTPATIENT)
Age: 69
End: 2024-01-19

## 2024-01-19 LAB
AORTIC ROOT: 2.8 CM
APICAL FOUR CHAMBER EJECTION FRACTION: 61 %
BSA FOR ECHO PROCEDURE: 1.71 M2
E WAVE DECELERATION TIME: 238 MS
E/A RATIO: 0.8
FRACTIONAL SHORTENING: 30 (ref 28–44)
INTERVENTRICULAR SEPTUM IN DIASTOLE (PARASTERNAL SHORT AXIS VIEW): 0.9 CM
INTERVENTRICULAR SEPTUM: 0.9 CM (ref 0.6–1.1)
IVC: 14 MM
LEFT ATRIUM SIZE: 2.2 CM
LEFT INTERNAL DIMENSION IN SYSTOLE: 2.6 CM (ref 2.1–4)
LEFT VENTRICULAR INTERNAL DIMENSION IN DIASTOLE: 3.7 CM (ref 3.5–6)
LEFT VENTRICULAR POSTERIOR WALL IN END DIASTOLE: 1 CM
LEFT VENTRICULAR STROKE VOLUME: 35 ML
LVSV (TEICH): 35 ML
MV E'TISSUE VEL-SEP: 8 CM/S
MV PEAK A VEL: 0.71 M/S
MV PEAK E VEL: 57 CM/S
MV STENOSIS PRESSURE HALF TIME: 69 MS
MV VALVE AREA P 1/2 METHOD: 3.19
RA PRESSURE ESTIMATED: 3 MMHG
RIGHT VENTRICLE ID DIMENSION: 3.2 CM
SL CV LV EF: 61
SL CV PED ECHO LEFT VENTRICLE DIASTOLIC VOLUME (MOD BIPLANE) 2D: 59 ML
SL CV PED ECHO LEFT VENTRICLE SYSTOLIC VOLUME (MOD BIPLANE) 2D: 24 ML

## 2024-01-19 NOTE — TELEPHONE ENCOUNTER
Pt calling stating he had his Echo done and was told by Dr. Olivarez to follow up once that was complete. He states he was told to have his daughter come to to the follow up to discuss the valves. He said the only days she can come in are 2/5, 2/12, 2/15 or 2/19. He is asking if Dr. Olivarez can fit him in on one of those days. He is willing to go to any office. Please advise.

## 2024-01-29 ENCOUNTER — RA CDI HCC (OUTPATIENT)
Dept: OTHER | Facility: HOSPITAL | Age: 69
End: 2024-01-29

## 2024-02-13 ENCOUNTER — TELEMEDICINE (OUTPATIENT)
Dept: FAMILY MEDICINE CLINIC | Facility: CLINIC | Age: 69
End: 2024-02-13
Payer: MEDICARE

## 2024-02-13 DIAGNOSIS — K21.00 GASTROESOPHAGEAL REFLUX DISEASE WITH ESOPHAGITIS WITHOUT HEMORRHAGE: Primary | ICD-10-CM

## 2024-02-13 DIAGNOSIS — J44.9 COPD, VERY SEVERE (HCC): ICD-10-CM

## 2024-02-13 PROBLEM — U07.1 COVID-19: Status: RESOLVED | Noted: 2023-12-06 | Resolved: 2024-02-13

## 2024-02-13 PROCEDURE — 99213 OFFICE O/P EST LOW 20 MIN: CPT | Performed by: FAMILY MEDICINE

## 2024-02-13 RX ORDER — PANTOPRAZOLE SODIUM 40 MG/1
40 TABLET, DELAYED RELEASE ORAL 2 TIMES DAILY
COMMUNITY
End: 2024-02-20 | Stop reason: SDUPTHER

## 2024-02-13 NOTE — PROGRESS NOTES
Virtual Regular Visit    Verification of patient location:    Patient is located at Home in the following state in which I hold an active license [place]      Assessment/Plan:    Problem List Items Addressed This Visit          Digestive    Gastroesophageal reflux disease with esophagitis without hemorrhage - Primary     Increase the protonix to BID  Patient will be referred to GI  Patient will watch diet          Relevant Medications    pantoprazole (PROTONIX) 40 mg tablet    Other Relevant Orders    Ambulatory Referral to Gastroenterology       Respiratory    COPD, very severe (HCC)     Patient breathing is stable Patient to continue prednisone as directed                  Reason for visit is   Chief Complaint   Patient presents with    Virtual Regular Visit        Encounter provider Nicole Mcneil DO    Provider located at [facility]  [address]  [address]  [place] [address] [phone]      Recent Visits  No visits were found meeting these conditions.  Showing recent visits within past 7 days and meeting all other requirements  Today's Visits  Date Type Provider Dept   02/13/24 Telemedicine Nicole Mcneil DO [facility]   Showing today's visits and meeting all other requirements  Future Appointments  No visits were found meeting these conditions.  Showing future appointments within next 150 days and meeting all other requirements       The patient was identified by name and date of birth. Samuel Flores Jr. was informed that this is a telemedicine visit and that the visit is being conducted through the [employer] platform. He agrees to proceed..  My office door was closed. No one else was in the room.  He acknowledged consent and understanding of privacy and security of the video platform. The patient has agreed to participate and understands they can discontinue the visit at any time.    [facility]     Subjective  Samuel Flores Jr. is a 68 y.o. male burning sensation in the esophagus and stomach area for 3 weeks  .      Patient has long standing history of severe COPD Patient also is on chronic pain medication for lumbar disc disease  Patient is on chronic oxycodone for back pain 7.5 mg every 6hours  Patient has no nausea Patient does have constipation also Patient notes coffee and acidic things like tomatoes and orange juice  Patient has not had a change in bowel movement She moves her bowels every 3 days Patient has no have any emesis He has not have any weight loss     Abdominal Pain  This is a new problem. Episode onset: 3 weeks. The onset quality is gradual. The problem occurs constantly. The most recent episode lasted 3 weeks. The problem has been gradually worsening. The pain is located in the epigastric region. The pain is at a severity of 7/10. The pain is moderate. The quality of the pain is colicky and burning. The abdominal pain radiates to the epigastric region. Associated symptoms include constipation and nausea. Pertinent negatives include no anorexia, arthralgias, belching, diarrhea, dysuria, fever, flatus, frequency, headaches, hematochezia, hematuria, melena, myalgias, vomiting or weight loss. The pain is aggravated by eating. The pain is relieved by Nothing. He has tried proton pump inhibitors and antacids for the symptoms. The treatment provided mild relief. His past medical history is significant for GERD. There is no history of abdominal surgery, colon cancer, Crohn's disease, gallstones, irritable bowel syndrome, pancreatitis, PUD or ulcerative colitis.        Past Medical History:   Diagnosis Date    Abnormal chest x-ray with multiple lung nodules     last assessed: April 25, 2012    Anxiety     Arthritis     Bipolar 2 disorder, major depressive episode (HCC)     Chronic tension headaches     last assessed: April 25, 2012    COPD (chronic obstructive pulmonary  disease) ([facility])     Depression 08/17/1980    Emphysema of lung ([facility]) 1/01/2017    Noticed from my first ct scan    GERD (gastroesophageal reflux disease)     Not sure of first occurance. Haven’t experienced any recent bouts.    Hypertension     Insomnia     Rheumatoid arthritis ([facility]) 10/01/1975    Scoliosis     Substance abuse ([facility]) 1/01/1970    Use of marajuana. Some experiments with cocain, crank and lsd.       Past Surgical History:   Procedure Laterality Date    CHOLECYSTECTOMY OPEN  01/2021    COLONOSCOPY      1-2016 [facility]    TONSILLECTOMY         Current Outpatient Medications   Medication Sig Dispense Refill    pantoprazole (PROTONIX) 40 mg tablet Take 40 mg by mouth 2 (two) times a day      aspirin 81 mg chewable tablet Chew 81 mg daily      azithromycin (ZITHROMAX) 250 mg tablet Take 1 tablet (250 mg total) by mouth 3 (three) times a week Take 2 tablets today then 1 tablet daily x 4 days 24 tablet 3    finasteride (PROSCAR) 5 mg tablet Take 1 tablet (5 mg total) by mouth daily for 360 doses 90 tablet 3    losartan (COZAAR) 25 mg tablet Take 12.5 mg by mouth daily      oxyCODONE-acetaminophen (Percocet) 7.5-325 MG per tablet Take 1 tablet by mouth every 8 (eight) hours as needed for moderate pain or severe pain Max Daily Amount: 3 tablets 30 tablet 0    pantoprazole (PROTONIX) 40 mg tablet Take 1 tablet (40 mg total) by mouth daily 90 tablet 3    predniSONE 5 mg tablet Take 1 tablet (5 mg total) by mouth daily 90 tablet 3    tamsulosin (FLOMAX) 0.4 mg Take 1 capsule (0.4 mg total) by mouth daily with dinner 90 capsule 2     No current facility-administered medications for this visit.        No Known Allergies    Review of Systems   Constitutional:  Negative for fever and weight loss.   Gastrointestinal:  Positive for abdominal pain, constipation and nausea. Negative for anorexia, diarrhea, flatus, hematochezia, melena and vomiting.   Genitourinary:  Negative for dysuria, frequency and hematuria.    Musculoskeletal:  Negative for arthralgias and myalgias.   Neurological:  Negative for headaches.       Video Exam    There were no vitals filed for this visit.    Physical Exam  Constitutional:       Appearance: Normal appearance.   HENT:      Head: Normocephalic.   Eyes:      Extraocular Movements: Extraocular movements intact.      Conjunctiva/sclera: Conjunctivae normal.      Pupils: Pupils are equal, round, and reactive to light.   Pulmonary:      Effort: Pulmonary effort is normal.   Neurological:      General: No focal deficit present.      Mental Status: He is alert and oriented to person, place, and time.   Psychiatric:         Mood and Affect: Mood normal.         Behavior: Behavior normal.          Visit Time  Total Visit Duration: 12

## 2024-02-20 ENCOUNTER — OFFICE VISIT (OUTPATIENT)
Dept: GASTROENTEROLOGY | Facility: MEDICAL CENTER | Age: 69
End: 2024-02-20
Payer: MEDICARE

## 2024-02-20 ENCOUNTER — OFFICE VISIT (OUTPATIENT)
Dept: PULMONOLOGY | Facility: CLINIC | Age: 69
End: 2024-02-20
Payer: MEDICARE

## 2024-02-20 ENCOUNTER — TELEPHONE (OUTPATIENT)
Dept: GASTROENTEROLOGY | Facility: MEDICAL CENTER | Age: 69
End: 2024-02-20

## 2024-02-20 VITALS
HEIGHT: 69 IN | TEMPERATURE: 97.4 F | DIASTOLIC BLOOD PRESSURE: 76 MMHG | SYSTOLIC BLOOD PRESSURE: 122 MMHG | OXYGEN SATURATION: 97 % | BODY MASS INDEX: 19.11 KG/M2 | HEART RATE: 96 BPM | WEIGHT: 129 LBS

## 2024-02-20 VITALS
WEIGHT: 130.2 LBS | TEMPERATURE: 97.2 F | HEART RATE: 88 BPM | SYSTOLIC BLOOD PRESSURE: 121 MMHG | DIASTOLIC BLOOD PRESSURE: 84 MMHG | BODY MASS INDEX: 19.23 KG/M2

## 2024-02-20 DIAGNOSIS — J44.9 COPD, VERY SEVERE (HCC): Primary | ICD-10-CM

## 2024-02-20 DIAGNOSIS — K21.00 GASTROESOPHAGEAL REFLUX DISEASE WITH ESOPHAGITIS WITHOUT HEMORRHAGE: ICD-10-CM

## 2024-02-20 DIAGNOSIS — R10.13 EPIGASTRIC PAIN: ICD-10-CM

## 2024-02-20 DIAGNOSIS — R63.4 WEIGHT LOSS: ICD-10-CM

## 2024-02-20 DIAGNOSIS — R11.0 NAUSEA: Primary | ICD-10-CM

## 2024-02-20 DIAGNOSIS — Z86.010 PERSONAL HISTORY OF COLONIC POLYPS: ICD-10-CM

## 2024-02-20 PROCEDURE — 99215 OFFICE O/P EST HI 40 MIN: CPT | Performed by: INTERNAL MEDICINE

## 2024-02-20 PROCEDURE — 99204 OFFICE O/P NEW MOD 45 MIN: CPT | Performed by: NURSE PRACTITIONER

## 2024-02-20 RX ORDER — PANTOPRAZOLE SODIUM 40 MG/1
40 TABLET, DELAYED RELEASE ORAL 2 TIMES DAILY
Qty: 60 TABLET | Refills: 3 | Status: SHIPPED | OUTPATIENT
Start: 2024-02-20 | End: 2024-03-02

## 2024-02-20 RX ORDER — OXYCODONE AND ACETAMINOPHEN 7.5; 325 MG/1; MG/1
1 TABLET ORAL EVERY 6 HOURS PRN
COMMUNITY
Start: 2024-01-17

## 2024-02-20 RX ORDER — POLYETHYLENE GLYCOL 3350, SODIUM SULFATE ANHYDROUS, SODIUM BICARBONATE, SODIUM CHLORIDE, POTASSIUM CHLORIDE 236; 22.74; 6.74; 5.86; 2.97 G/4L; G/4L; G/4L; G/4L; G/4L
4000 POWDER, FOR SOLUTION ORAL ONCE
Qty: 4000 ML | Refills: 0 | Status: SHIPPED | OUTPATIENT
Start: 2024-02-20 | End: 2024-03-02

## 2024-02-20 NOTE — H&P (VIEW-ONLY)
Pulmonary Follow Up Note   Samuel Flores Jr. 68 y.o. male MRN: [id number]  2/20/2024      Assessment/Plan:     COPD, very severe (HCC)  Patient has completed workup for bronchoscopic lung volume reduction and appears to be a candidate for treatment targeting the left lower lobe.  Although he has significant disease in the right lower lobe, the degree of bullous emphysema precludes treatment on the right due to prohibitive pneumothorax risk.  He understands the benefits of the procedure, noting improvement in FEV1, improved quality of life and further 6-minute walk distance.  We also discussed the risks at length.  Specifically, there is a 30% risk of pneumothorax which can be life-threatening.  Additional risks include hypoxia, pneumonia and COPD exacerbation.  Patient states that his current quality of life is not acceptable and he is willing to take these risks.  We have tentatively scheduled the procedure for Monday, 2/26.  He may elect to push into March, and will decide later this week regarding definitive timing.    His 2 daughters were also present for the conversation via telephone and all of their questions were answered.  They verbalized understanding of risk and benefit        Visit orders:    Diagnoses and all orders for this visit:    COPD, very severe (HCC)  -     Bronchoscopy w/ bronchoscopic lung volume reduction; Future    Other orders  -     oxyCODONE-acetaminophen (PERCOCET) 7.5-325 MG per tablet; Take 1 tablet by mouth every 6 (six) hours as needed for moderate pain or severe pain  -     Diet NPO; Sips with meds; Standing  -     Insert peripheral IV; Standing          History of Present Illness   HPI:  Samuel Flores Jr. is a 68 y.o. male who is here today to finalize plans for bronchoscopic lung volume reduction.  We also made arrangements for this visit so that his adult children can be participants in the conversation so that they also can hear risks and benefits of the procedure.  From  pulmonary perspective, patient's overall status is unchanged compared with last visit in January.  He continues struggle with daily symptoms of shortness of breath.  Unfortunately he is not able to take any maintenance inhalers or nebulizer therapy due to feeling as if it worsens his breathing.  He is maintained on prednisone 5 mg daily and azithromycin 3 times weekly.  No recent ER visits or hospitalizations related to COPD.    Review of Systems   Constitutional:  Positive for appetite change. Negative for fever.   HENT:  Positive for postnasal drip, rhinorrhea, sneezing and sore throat. Negative for ear pain and trouble swallowing.    Respiratory:  Positive for shortness of breath.    Cardiovascular:  Positive for chest pain.   Musculoskeletal:  Positive for myalgias.   Neurological:  Negative for headaches.    patient takes oxycodone 4 times per day for chronic back pain.  Otherwise negative.    Medical, Family and Social history reviewed and updated as appropriate    Historical Information   Past Medical History:   Diagnosis Date    Abnormal chest x-ray with multiple lung nodules     last assessed: April 25, 2012    Anxiety     Arthritis     Bipolar 2 disorder, major depressive episode ([facility])     Chronic tension headaches     last assessed: April 25, 2012    COPD (chronic obstructive pulmonary disease) ([facility])     Depression 08/17/1980    Emphysema of lung ([facility]) 1/01/2017    Noticed from my first ct scan    GERD (gastroesophageal reflux disease)     Not sure of first occurance. Haven’t experienced any recent bouts.    Hypertension     Insomnia     Rheumatoid arthritis ([facility]) 10/01/1975    Scoliosis     Substance abuse ([facility]) 1/01/1970    Use of marajuana. Some experiments with cocain, crank and lsd.     Past Surgical History:   Procedure Laterality Date    CHOLECYSTECTOMY OPEN  01/2021    COLONOSCOPY      1-2016 [facility]    TONSILLECTOMY       Family History   Problem Relation Age of Onset    Stroke Mother     Other  "Mother         epilepsy     Vision loss Mother     Prostate cancer Father     Dementia Father     Depression Brother     Bipolar disorder Brother     Heart disease Brother        Social History     Tobacco Use   Smoking Status Former    Current packs/day: 0.00    Average packs/day: 1 pack/day for 54.0 years (54.0 ttl pk-yrs)    Types: Cigarettes    Start date: 1969    Quit date: 2023    Years since quittin.4   Smokeless Tobacco Never         Meds/Allergies     Current Outpatient Medications:     aspirin 81 mg chewable tablet, Chew 81 mg daily, Disp: , Rfl:     azithromycin (ZITHROMAX) 250 mg tablet, Take 1 tablet (250 mg total) by mouth 3 (three) times a week Take 2 tablets today then 1 tablet daily x 4 days, Disp: 24 tablet, Rfl: 3    finasteride (PROSCAR) 5 mg tablet, Take 1 tablet (5 mg total) by mouth daily for 360 doses, Disp: 90 tablet, Rfl: 3    losartan (COZAAR) 25 mg tablet, Take 12.5 mg by mouth daily, Disp: , Rfl:     oxyCODONE-acetaminophen (PERCOCET) 7.5-325 MG per tablet, Take 1 tablet by mouth every 6 (six) hours as needed for moderate pain or severe pain, Disp: , Rfl:     pantoprazole (PROTONIX) 40 mg tablet, Take 1 tablet (40 mg total) by mouth 2 (two) times a day, Disp: 60 tablet, Rfl: 3    predniSONE 5 mg tablet, Take 1 tablet (5 mg total) by mouth daily, Disp: 90 tablet, Rfl: 3    tamsulosin (FLOMAX) 0.4 mg, Take 1 capsule (0.4 mg total) by mouth daily with dinner, Disp: 90 capsule, Rfl: 2    pantoprazole (PROTONIX) 40 mg tablet, Take 1 tablet (40 mg total) by mouth daily (Patient not taking: Reported on 2024), Disp: 90 tablet, Rfl: 3    polyethylene glycol (Golytely) 4000 mL solution, Take 4,000 mL by mouth once for 1 dose Take 4000 mL by mouth once for 1 dose. Use as directed (Patient not taking: Reported on 2024), Disp: 4000 mL, Rfl: 0  No Known Allergies    Vitals: Blood pressure 122/76, pulse 96, temperature (!) 97.4 °F (36.3 °C), height 5' 9\" (1.753 m), weight 58.5 kg " "(129 lb), SpO2 97%. Body mass index is 19.05 kg/m². Oxygen Therapy  SpO2: 97 %  Oxygen Therapy: None (Room air)    Physical Exam   Physical Exam  Constitutional:       Appearance: Normal appearance.   HENT:      Mouth/Throat:      Pharynx: No oropharyngeal exudate.   Cardiovascular:      Rate and Rhythm: Normal rate and regular rhythm.   Pulmonary:      Effort: Pulmonary effort is normal. No respiratory distress.   Musculoskeletal:         General: No swelling.   Neurological:      Mental Status: He is alert.   Psychiatric:         Mood and Affect: Mood normal.         Behavior: Behavior normal.         Labs: I have personally reviewed pertinent lab results.  Lab Results   Component Value Date    WBC 6.7 11/24/2021    HGB 16.4 11/24/2021    HCT 47.1 11/24/2021    MCV 89 11/24/2021     11/24/2021     Lab Results   Component Value Date    GLUCOSE 86 06/21/2016    CALCIUM 9.0 11/15/2023     06/21/2016    K 4.1 11/15/2023    CO2 30 11/15/2023     11/15/2023    BUN 24 11/15/2023    CREATININE 0.81 11/15/2023     No results found for: \"IGE\"  Lab Results   Component Value Date    ALT 16 11/12/2023    AST 18 11/12/2023    ALKPHOS 58 11/12/2023    BILITOT <0.2 06/21/2016     Chest CT - date 10/6/2023  Severe emphysema with right lower lobe bulla  Stable nodules measuring up to 4 mm in size in the right middle, right lower and left upper lobes -stable going back to 2011     SPECT-CT scan from 10/23/2023  RIGHT LUNG:     RIGHT LUNG COUNTS (%):  Right upper lobe:    23  Right middle lobe:     7  Right lower lobe:   13  Total:                42     RIGHT LUNG VOLUMES (%):  Right upper lobe:   22  Right middle lobe:    4  Right lower lobe:     27  Total:                52     LEFT LUNG:     LEFT LUNG COUNTS (%):  Left upper lobe:    35  Left lower lobe:   23  Total:                58     LEFT LUNG VOLUMES (%):  Left upper lobe:    22  Left lower lobe:   25  Total:                48     Pulmonary function " testing:  Performed 10/4/2023  FEV1/FVC ratio 32%    FEV1 21% predicted  FVC 50% predicted  No response to bronchodilators  Post BD FEV1 20 percent predicted   % predicted   % predicted  DLCO corrected for hemoglobin 33 % predicted  PFTs with very severe obstruction, severe hyperinflation and air trapping and severe diffusion impairment     ABG -10/6/2023- pH 7.44 / pCO2 43 / pO2 82     6WMT   Date 10/6/2023  Distance walked 183 m, saturation reached 93% on room air     6-minute walk test was performed 1/11/24.  Patient ambulated 126 m with saturations reaching a low of 95%.     Pulmonary Rehabilitation -on hold -will need to resume after valves     Other Studies: I have personally reviewed pertinent reports.       2D echocardiogram -9/5/2023  EF 55-60%, normal diastolic function, no evidence of pulm hypertension     Echocardiogram on 11/12/2023 showed an EF of 20% consistent with stress-induced cardiomyopathy     Echocardiogram on 11/16/2023 showed an EF of 40-45%    Echocardiogram on 1/18/2024 showed complete resolution of cardiomyopathy with an EF of 61%            Answers submitted by the patient for this visit:  Pulmonology Questionnaire (Submitted on 2/19/2024)  Chief Complaint: Primary symptoms  Do you have difficulty breathing?: Yes  Do you experience frequent throat clearing?: Yes  Chronicity: chronic  When did you first notice your symptoms?: more than 1 year ago  How often do your symptoms occur?: constantly  Since you first noticed this problem, how has it changed?: unchanged  Do you have shortness of breath that occurs with effort or exertion?: Yes  Do you have ear congestion?: No  Do you have heartburn?: Yes  Do you have fatigue?: Yes  Do you have nasal congestion?: Yes  Do you have shortness of breath when lying flat?: No  Do you have shortness of breath when you wake up?: Yes  Do you have sweats?: Yes  Have you experienced weight loss?: Yes  Which of the following makes your symptoms  worse?: any activity, climbing stairs, eating, emotional stress, exercise, exposure to fumes, exposure to smoke, minimal activity, strenuous activity  Which of the following makes your symptoms better?: nothing  Risk factors for lung disease: animal exposure

## 2024-02-20 NOTE — ASSESSMENT & PLAN NOTE
Patient has completed workup for bronchoscopic lung volume reduction and appears to be a candidate for treatment targeting the left lower lobe.  Although he has significant disease in the right lower lobe, the degree of bullous emphysema precludes treatment on the right due to prohibitive pneumothorax risk.  He understands the benefits of the procedure, noting improvement in FEV1, improved quality of life and further 6-minute walk distance.  We also discussed the risks at length.  Specifically, there is a 30% risk of pneumothorax which can be life-threatening.  Additional risks include hypoxia, pneumonia and COPD exacerbation.  Patient states that his current quality of life is not acceptable and he is willing to take these risks.  We have tentatively scheduled the procedure for Monday, 2/26.  He may elect to push into March, and will decide later this week regarding definitive timing.    His 2 daughters were also present for the conversation via telephone and all of their questions were answered.  They verbalized understanding of risk and benefit

## 2024-02-20 NOTE — PROGRESS NOTES
[facility] - Outpatient Consultation  Samuel Flores Jr. 68 y.o. male MRN: [id number]  Encounter: [id number]          ASSESSMENT AND PLAN:    Samuel Flores Jr. is a 68 y.o. male who presents with complaint of epigastric burning/reflux and personal history of colon polyps.    1. Gastroesophageal reflux disease with esophagitis without hemorrhage  2.  Epigastric pain    Started approximately 3 months ago with epigastric burning and midsternal chest burning with regurgitation.  This can occur several times per week.  Cannot pinpoint triggers.  He was drinking 3 cups of caffeine per day but now only drinking 2 cups/week.  He is on chronic prednisone 5 mg daily for his severe COPD.  Reports last EGD approximately 10 years ago.  States he was treated for H. pylori x 2 in the past.  Recently started on pantoprazole 40 mg daily with some help.  PCP increased it to twice daily but he has not started that dose yet.  Denies any dysphagia.  Some nausea but no vomiting.  This can occur at any time.  No alcohol use.  He was drinking 3 cups of caffeine per day but now only using 2 cups/week.  No NSAID use.  Will rule out PUD, H. pylori, gastritis/esophagitis, celiac.  Patient does not want to stop PPI to check for H. pylori at this time.  Will check during EGD.    -Antireflux diet  -Pantoprazole 40 mg twice daily  -Celiac panel, TSH  -EGD in hospital setting with colonoscopy  -Will obtain pulmonary clearance before procedure      2.  Personal history of colon polyps    He has been on oxycodone for chronic back pain over the past 6 months.  Reports BMs are brown and formed every other day.  He does use Ex-Lax as needed to have a BM.  Denies any melena hematochezia.  Has tried MiraLAX but not on a daily basis with little help.  Last colonoscopy was 1/16 which noted severe diverticulosis and 2 polyps.  Path report not available during visit today.  Repeat colonoscopy recommended in 5  years.    -Colonoscopy with 2-day prep  -Start MiraLAX 1 capful daily  -Increase water intake to 8 cups/day  -High-fiber diet  -Follow-up in office after test    I reviewed with patient/family potential risks of endoscopic evaluation including possible infection, bleeding or perforation.  Patient/family verbalized understanding of potential risks and agreed to procedure(s).    Will obtain pulmonary clearance before procedures.           ______________________________________________________________________    HPI:    Samuel Flores Jr. is a 68 y.o. male who presents with complaint of epigastric burning/reflux and personal history of colon polyps.  He has a past medical history of diverticulosis, GERD, COPD, Takotsubo cardiomyopathy, BPH, HLD, bipolar disorder, ROSSI and opioid dependence.    Started approximately 3 months ago with epigastric burning and midsternal chest burning with regurgitation.  This can occur several times per week.  Cannot pinpoint triggers.  He was drinking 3 cups of caffeine per day but now only drinking 2 cups/week.  He is on chronic prednisone 5 mg daily for his severe COPD.  Pantoprazole 40 mg daily not controlling his symptoms but helping somewhat.  Reports he has had a history of H. pylori x 2 in the past.  Occasional nausea postprandially but no vomiting.  Reports he has lost about 10 pounds over the past year but has recently gained about 5 back.  Patient has had a decreased appetite for several years.    He has been on oxycodone for chronic back pain over the past 6 months.  Reports BMs are brown and formed every other day.  He does use Ex-Lax as needed to have a BM.  Denies any melena hematochezia.  Has tried MiraLAX but not on a daily basis with little help.  Last colonoscopy was 1/16 which noted severe diverticulosis and 2 polyps.  Path report not available during visit today.  Repeat colonoscopy recommended in 5 years.    Labs 11/23-BMP normal and CBC normal.      Previous  EGD/colonoscopy    Colonoscopy 1/16-severe diverticulosis, 2 polyps.  Path report not available during visit today.  Recall colonoscopy recommended in 5 years.    EGD more then 10 years ago- normal per pt        REVIEW OF SYSTEMS:    CONSTITUTIONAL: Denies any fever, chills, rigors, and weight loss.  HEENT: No earache or tinnitus. Denies hearing loss or visual disturbances.  CARDIOVASCULAR: No chest pain or palpitations.   RESPIRATORY: Denies any cough, hemoptysis, shortness of breath or dyspnea on exertion.  GASTROINTESTINAL: As noted in the History of Present Illness.   GENITOURINARY: No problems with urination. Denies any hematuria or dysuria.  NEUROLOGIC: No dizziness or vertigo, denies headaches.   MUSCULOSKELETAL: Denies any muscle or joint pain.   SKIN: Denies skin rashes or itching.   ENDOCRINE: Denies excessive thirst. Denies intolerance to heat or cold.  PSYCHOSOCIAL: Denies depression or anxiety. Denies any recent memory loss.       Historical Information   Past Medical History:   Diagnosis Date   • Abnormal chest x-ray with multiple lung nodules     last assessed: April 25, 2012   • Anxiety    • Arthritis    • Bipolar 2 disorder, major depressive episode (HCC)    • Chronic tension headaches     last assessed: April 25, 2012   • COPD (chronic obstructive pulmonary disease) ([facility])    • Depression 08/17/1980   • Emphysema of lung ([facility]) 1/01/2017    Noticed from my first ct scan   • GERD (gastroesophageal reflux disease)     Not sure of first occurance. Haven’t experienced any recent bouts.   • Hypertension    • Insomnia    • Rheumatoid arthritis ([facility]) 10/01/1975   • Scoliosis    • Substance abuse ([facility]) 1/01/1970    Use of marajuana. Some experiments with cocain, crank and lsd.     Past Surgical History:   Procedure Laterality Date   • CHOLECYSTECTOMY OPEN  01/2021   • COLONOSCOPY      1-2016 [facility]   • TONSILLECTOMY       Social History   Social History     Substance and Sexual Activity   Alcohol Use Yes   •  Alcohol/week: 1.0 standard drink of alcohol   • Types: 1 Glasses of wine per week    Comment: Not even 1glass of wine per week. Often go for months on end     Social History     Substance and Sexual Activity   Drug Use Not Currently   • Types: Cocaine, Hashish, LSD, Marijuana     Social History     Tobacco Use   Smoking Status Former   • Current packs/day: 0.00   • Average packs/day: 1 pack/day for 54.0 years (54.0 ttl pk-yrs)   • Types: Cigarettes   • Start date: 1969   • Quit date: 2023   • Years since quittin.4   Smokeless Tobacco Never     Family History   Problem Relation Age of Onset   • Stroke Mother    • Other Mother         epilepsy    • Vision loss Mother    • Prostate cancer Father    • Dementia Father    • Depression Brother    • Bipolar disorder Brother    • Heart disease Brother        Meds/Allergies       Current Outpatient Medications:   •  aspirin 81 mg chewable tablet  •  azithromycin (ZITHROMAX) 250 mg tablet  •  finasteride (PROSCAR) 5 mg tablet  •  losartan (COZAAR) 25 mg tablet  •  pantoprazole (PROTONIX) 40 mg tablet  •  pantoprazole (PROTONIX) 40 mg tablet  •  polyethylene glycol (Golytely) 4000 mL solution  •  predniSONE 5 mg tablet  •  tamsulosin (FLOMAX) 0.4 mg  •  oxyCODONE-acetaminophen (Percocet) 7.5-325 MG per tablet    No Known Allergies        Objective     Blood pressure 121/84, pulse 88, temperature (!) 97.2 °F (36.2 °C), weight 59.1 kg (130 lb 3.2 oz). Body mass index is 19.23 kg/m².        PHYSICAL EXAM:      General Appearance:   Alert, cooperative, no distress   HEENT:   Normocephalic, atraumatic, anicteric.     Neck:  Supple, symmetrical, trachea midline   Lungs:   Clear to auscultation bilaterally; no rales, rhonchi or wheezing; respirations unlabored    Heart::   Regular rate and rhythm; no murmur, rub, or gallop.   Abdomen:   Soft, non-tender, non-distended; normal bowel sounds; no masses, no organomegaly    Genitalia:   Deferred    Rectal:   Deferred   "  Extremities:  No cyanosis, clubbing or edema    Pulses:  2+ and symmetric    Skin:  No jaundice, rashes, or lesions    Lymph nodes:  No palpable cervical lymphadenopathy        Lab Results:   No visits with results within 1 Day(s) from this visit.   Latest known visit with results is:   Hospital Outpatient Visit on 01/18/2024   Component Date Value   • MV Peak A Tom 01/18/2024 0.71    • MV stenosis pressure 1/2* 01/18/2024 69    • MV Peak E Tom 01/18/2024 57    • E wave deceleration time 01/18/2024 238    • E/A ratio 01/18/2024 0.80    • MV valve area p 1/2 meth* 01/18/2024 3.19    • RVID d 01/18/2024 3.2    • A4C EF 01/18/2024 61    • Left ventricular stroke * 01/18/2024 35.00    • IVSd 01/18/2024 0.90    • Ao root 01/18/2024 2.80    • LVPWd 01/18/2024 1.00    • LA size 01/18/2024 2.2    • FS 01/18/2024 30    • LVIDS 01/18/2024 2.60    • IVS 01/18/2024 0.9    • LVIDd 01/18/2024 3.70    • LEFT VENTRICLE SYSTOLIC * 01/18/2024 24    • LV DIASTOLIC VOLUME (MOD* 01/18/2024 59    • MV E' Tissue Velocity Se* 01/18/2024 8    • LVSV, 2D 01/18/2024 35    • BSA 01/18/2024 1.71    • LV EF 01/18/2024 61    • Est. RA pres 01/18/2024 3.0    • IVC 01/18/2024 14.0        Lab Results   Component Value Date    WBC 6.7 11/24/2021    HGB 16.4 11/24/2021    HCT 47.1 11/24/2021    MCV 89 11/24/2021     11/24/2021       Lab Results   Component Value Date     06/21/2016    SODIUM 138 11/15/2023    K 4.1 11/15/2023     11/15/2023    CO2 30 11/15/2023    ANIONGAP 3 (L) 04/08/2015    AGAP 5 11/15/2023    BUN 24 11/15/2023    CREATININE 0.81 11/15/2023    GLUC 100 (H) 11/15/2023    CALCIUM 9.0 11/15/2023    AST 18 11/12/2023    ALT 16 11/12/2023    ALKPHOS 58 11/12/2023    PROT 6.3 06/21/2016    TP 7.0 11/12/2023    BILITOT <0.2 06/21/2016    TBILI 0.3 11/12/2023    EGFR 95 11/15/2023       No results found for: \"CRP\"    Lab Results   Component Value Date    TSH 1.480 03/10/2018           Radiology Results:   No results " found.

## 2024-02-20 NOTE — PROGRESS NOTES
Pulmonary Follow Up Note   Samuel Flores Jr. 68 y.o. male MRN: [id number]  2/20/2024      Assessment/Plan:     COPD, very severe (HCC)  Patient has completed workup for bronchoscopic lung volume reduction and appears to be a candidate for treatment targeting the left lower lobe.  Although he has significant disease in the right lower lobe, the degree of bullous emphysema precludes treatment on the right due to prohibitive pneumothorax risk.  He understands the benefits of the procedure, noting improvement in FEV1, improved quality of life and further 6-minute walk distance.  We also discussed the risks at length.  Specifically, there is a 30% risk of pneumothorax which can be life-threatening.  Additional risks include hypoxia, pneumonia and COPD exacerbation.  Patient states that his current quality of life is not acceptable and he is willing to take these risks.  We have tentatively scheduled the procedure for Monday, 2/26.  He may elect to push into March, and will decide later this week regarding definitive timing.    His 2 daughters were also present for the conversation via telephone and all of their questions were answered.  They verbalized understanding of risk and benefit        Visit orders:    Diagnoses and all orders for this visit:    COPD, very severe (HCC)  -     Bronchoscopy w/ bronchoscopic lung volume reduction; Future    Other orders  -     oxyCODONE-acetaminophen (PERCOCET) 7.5-325 MG per tablet; Take 1 tablet by mouth every 6 (six) hours as needed for moderate pain or severe pain  -     Diet NPO; Sips with meds; Standing  -     Insert peripheral IV; Standing          History of Present Illness   HPI:  Samuel Flores Jr. is a 68 y.o. male who is here today to finalize plans for bronchoscopic lung volume reduction.  We also made arrangements for this visit so that his adult children can be participants in the conversation so that they also can hear risks and benefits of the procedure.  From  pulmonary perspective, patient's overall status is unchanged compared with last visit in January.  He continues struggle with daily symptoms of shortness of breath.  Unfortunately he is not able to take any maintenance inhalers or nebulizer therapy due to feeling as if it worsens his breathing.  He is maintained on prednisone 5 mg daily and azithromycin 3 times weekly.  No recent ER visits or hospitalizations related to COPD.    Review of Systems   Constitutional:  Positive for appetite change. Negative for fever.   HENT:  Positive for postnasal drip, rhinorrhea, sneezing and sore throat. Negative for ear pain and trouble swallowing.    Respiratory:  Positive for shortness of breath.    Cardiovascular:  Positive for chest pain.   Musculoskeletal:  Positive for myalgias.   Neurological:  Negative for headaches.    patient takes oxycodone 4 times per day for chronic back pain.  Otherwise negative.    Medical, Family and Social history reviewed and updated as appropriate    Historical Information   Past Medical History:   Diagnosis Date    Abnormal chest x-ray with multiple lung nodules     last assessed: April 25, 2012    Anxiety     Arthritis     Bipolar 2 disorder, major depressive episode ([facility])     Chronic tension headaches     last assessed: April 25, 2012    COPD (chronic obstructive pulmonary disease) ([facility])     Depression 08/17/1980    Emphysema of lung ([facility]) 1/01/2017    Noticed from my first ct scan    GERD (gastroesophageal reflux disease)     Not sure of first occurance. Haven’t experienced any recent bouts.    Hypertension     Insomnia     Rheumatoid arthritis ([facility]) 10/01/1975    Scoliosis     Substance abuse ([facility]) 1/01/1970    Use of marajuana. Some experiments with cocain, crank and lsd.     Past Surgical History:   Procedure Laterality Date    CHOLECYSTECTOMY OPEN  01/2021    COLONOSCOPY      1-2016 [facility]    TONSILLECTOMY       Family History   Problem Relation Age of Onset    Stroke Mother     Other  "Mother         epilepsy     Vision loss Mother     Prostate cancer Father     Dementia Father     Depression Brother     Bipolar disorder Brother     Heart disease Brother        Social History     Tobacco Use   Smoking Status Former    Current packs/day: 0.00    Average packs/day: 1 pack/day for 54.0 years (54.0 ttl pk-yrs)    Types: Cigarettes    Start date: 1969    Quit date: 2023    Years since quittin.4   Smokeless Tobacco Never         Meds/Allergies     Current Outpatient Medications:     aspirin 81 mg chewable tablet, Chew 81 mg daily, Disp: , Rfl:     azithromycin (ZITHROMAX) 250 mg tablet, Take 1 tablet (250 mg total) by mouth 3 (three) times a week Take 2 tablets today then 1 tablet daily x 4 days, Disp: 24 tablet, Rfl: 3    finasteride (PROSCAR) 5 mg tablet, Take 1 tablet (5 mg total) by mouth daily for 360 doses, Disp: 90 tablet, Rfl: 3    losartan (COZAAR) 25 mg tablet, Take 12.5 mg by mouth daily, Disp: , Rfl:     oxyCODONE-acetaminophen (PERCOCET) 7.5-325 MG per tablet, Take 1 tablet by mouth every 6 (six) hours as needed for moderate pain or severe pain, Disp: , Rfl:     pantoprazole (PROTONIX) 40 mg tablet, Take 1 tablet (40 mg total) by mouth 2 (two) times a day, Disp: 60 tablet, Rfl: 3    predniSONE 5 mg tablet, Take 1 tablet (5 mg total) by mouth daily, Disp: 90 tablet, Rfl: 3    tamsulosin (FLOMAX) 0.4 mg, Take 1 capsule (0.4 mg total) by mouth daily with dinner, Disp: 90 capsule, Rfl: 2    pantoprazole (PROTONIX) 40 mg tablet, Take 1 tablet (40 mg total) by mouth daily (Patient not taking: Reported on 2024), Disp: 90 tablet, Rfl: 3    polyethylene glycol (Golytely) 4000 mL solution, Take 4,000 mL by mouth once for 1 dose Take 4000 mL by mouth once for 1 dose. Use as directed (Patient not taking: Reported on 2024), Disp: 4000 mL, Rfl: 0  No Known Allergies    Vitals: Blood pressure 122/76, pulse 96, temperature (!) 97.4 °F (36.3 °C), height 5' 9\" (1.753 m), weight 58.5 kg " "(129 lb), SpO2 97%. Body mass index is 19.05 kg/m². Oxygen Therapy  SpO2: 97 %  Oxygen Therapy: None (Room air)    Physical Exam   Physical Exam  Constitutional:       Appearance: Normal appearance.   HENT:      Mouth/Throat:      Pharynx: No oropharyngeal exudate.   Cardiovascular:      Rate and Rhythm: Normal rate and regular rhythm.   Pulmonary:      Effort: Pulmonary effort is normal. No respiratory distress.   Musculoskeletal:         General: No swelling.   Neurological:      Mental Status: He is alert.   Psychiatric:         Mood and Affect: Mood normal.         Behavior: Behavior normal.         Labs: I have personally reviewed pertinent lab results.  Lab Results   Component Value Date    WBC 6.7 11/24/2021    HGB 16.4 11/24/2021    HCT 47.1 11/24/2021    MCV 89 11/24/2021     11/24/2021     Lab Results   Component Value Date    GLUCOSE 86 06/21/2016    CALCIUM 9.0 11/15/2023     06/21/2016    K 4.1 11/15/2023    CO2 30 11/15/2023     11/15/2023    BUN 24 11/15/2023    CREATININE 0.81 11/15/2023     No results found for: \"IGE\"  Lab Results   Component Value Date    ALT 16 11/12/2023    AST 18 11/12/2023    ALKPHOS 58 11/12/2023    BILITOT <0.2 06/21/2016     Chest CT - date 10/6/2023  Severe emphysema with right lower lobe bulla  Stable nodules measuring up to 4 mm in size in the right middle, right lower and left upper lobes -stable going back to 2011     SPECT-CT scan from 10/23/2023  RIGHT LUNG:     RIGHT LUNG COUNTS (%):  Right upper lobe:    23  Right middle lobe:     7  Right lower lobe:   13  Total:                42     RIGHT LUNG VOLUMES (%):  Right upper lobe:   22  Right middle lobe:    4  Right lower lobe:     27  Total:                52     LEFT LUNG:     LEFT LUNG COUNTS (%):  Left upper lobe:    35  Left lower lobe:   23  Total:                58     LEFT LUNG VOLUMES (%):  Left upper lobe:    22  Left lower lobe:   25  Total:                48     Pulmonary function " testing:  Performed 10/4/2023  FEV1/FVC ratio 32%    FEV1 21% predicted  FVC 50% predicted  No response to bronchodilators  Post BD FEV1 20 percent predicted   % predicted   % predicted  DLCO corrected for hemoglobin 33 % predicted  PFTs with very severe obstruction, severe hyperinflation and air trapping and severe diffusion impairment     ABG -10/6/2023- pH 7.44 / pCO2 43 / pO2 82     6WMT   Date 10/6/2023  Distance walked 183 m, saturation reached 93% on room air     6-minute walk test was performed 1/11/24.  Patient ambulated 126 m with saturations reaching a low of 95%.     Pulmonary Rehabilitation -on hold -will need to resume after valves     Other Studies: I have personally reviewed pertinent reports.       2D echocardiogram -9/5/2023  EF 55-60%, normal diastolic function, no evidence of pulm hypertension     Echocardiogram on 11/12/2023 showed an EF of 20% consistent with stress-induced cardiomyopathy     Echocardiogram on 11/16/2023 showed an EF of 40-45%    Echocardiogram on 1/18/2024 showed complete resolution of cardiomyopathy with an EF of 61%            Answers submitted by the patient for this visit:  Pulmonology Questionnaire (Submitted on 2/19/2024)  Chief Complaint: Primary symptoms  Do you have difficulty breathing?: Yes  Do you experience frequent throat clearing?: Yes  Chronicity: chronic  When did you first notice your symptoms?: more than 1 year ago  How often do your symptoms occur?: constantly  Since you first noticed this problem, how has it changed?: unchanged  Do you have shortness of breath that occurs with effort or exertion?: Yes  Do you have ear congestion?: No  Do you have heartburn?: Yes  Do you have fatigue?: Yes  Do you have nasal congestion?: Yes  Do you have shortness of breath when lying flat?: No  Do you have shortness of breath when you wake up?: Yes  Do you have sweats?: Yes  Have you experienced weight loss?: Yes  Which of the following makes your symptoms  worse?: any activity, climbing stairs, eating, emotional stress, exercise, exposure to fumes, exposure to smoke, minimal activity, strenuous activity  Which of the following makes your symptoms better?: nothing  Risk factors for lung disease: animal exposure

## 2024-02-20 NOTE — TELEPHONE ENCOUNTER
Our mutual patient, Samuel Flores Jr., is scheduled for the following   procedure(s): EGD/Colonoscopy              On: 04/26/2024              With: Dr. Cox    Our office is requesting pulmonary clearance for the upcoming procedure(s).        Thank you,  [facility]

## 2024-02-20 NOTE — TELEPHONE ENCOUNTER
Procedure: EGD/Colonoscopy  Date: 04/26/2024  Physician performing: Dr. Cox  Location of procedure:  [facility]  Instructions given to patient: Yes  Diabetic: No  Clearances: Yes (pulmonary)

## 2024-02-21 ENCOUNTER — TELEPHONE (OUTPATIENT)
Dept: PULMONOLOGY | Facility: CLINIC | Age: 69
End: 2024-02-21

## 2024-02-21 NOTE — TELEPHONE ENCOUNTER
Dr. Olivarez will be performing a Endobronchial Valve Placement on Samuel Flores  on 02/26/2024     LOCATION: [facility]    ANTICOAGULATION INSTRUCTIONS: NONE    OTHER MEDICATION INSTRUCTIONS: NONE    LABS: (CBC/PT/PTT in last 90 days): CBC 02/20/2024 NO RECENT PT/PTT   (If no, labs ordered / to be done at least one day prior to procedure)    LAST OFFICE NOTE/H&P within 30 days of procedure: 02/20/2024    INSTRUCTIONS GIVEN: Spoke with patient advised of procedure date and location. Patient aware he is to be NPO after midnight the night prior and will receive a call this coming Friday with time of arrival for Monday 02/26. Patient aware he will be inpatient for 3-4 days. He will give me a call if he has any further questions prior to procedure.    Thank You     Yanely Chang

## 2024-02-22 ENCOUNTER — TELEPHONE (OUTPATIENT)
Dept: UROLOGY | Facility: CLINIC | Age: 69
End: 2024-02-22

## 2024-02-22 NOTE — TELEPHONE ENCOUNTER
Voicemail left for the patient stating his appointment on 3/5/2024 with Dr Potts needs to be cancelled.  Please call the office for a new appointment.

## 2024-02-22 NOTE — TELEPHONE ENCOUNTER
Patient returning call to r/s appt with Dr. Potts.      Patient requesting a call back at [phone]

## 2024-02-23 NOTE — TELEPHONE ENCOUNTER
Pulmonary clearance pending    He will be having bronchoscopic lung volume reduction on 2/26/2024.  As long as he recovers uneventfully, he will be able to have colonoscopy.  But unable to say at this time.

## 2024-02-23 NOTE — TELEPHONE ENCOUNTER
Voicemail left for the patient stating I am holding 4/5/2024 at 1100 am with Dr Potts at the [facility] office.  Asked patient to call to confirm.

## 2024-02-25 ENCOUNTER — ANESTHESIA EVENT (OUTPATIENT)
Dept: ANESTHESIOLOGY | Facility: HOSPITAL | Age: 69
End: 2024-02-25

## 2024-02-25 ENCOUNTER — ANESTHESIA (OUTPATIENT)
Dept: ANESTHESIOLOGY | Facility: HOSPITAL | Age: 69
End: 2024-02-25

## 2024-02-26 ENCOUNTER — HOSPITAL ENCOUNTER (INPATIENT)
Dept: PERIOP | Facility: HOSPITAL | Age: 69
LOS: 5 days | Discharge: HOME | DRG: 164 | End: 2024-03-02
Attending: INTERNAL MEDICINE | Admitting: INTERNAL MEDICINE
Payer: MEDICARE

## 2024-02-26 ENCOUNTER — APPOINTMENT (INPATIENT)
Dept: RADIOLOGY | Facility: HOSPITAL | Age: 69
DRG: 164 | End: 2024-02-26
Payer: MEDICARE

## 2024-02-26 ENCOUNTER — ANESTHESIA EVENT (OUTPATIENT)
Dept: PERIOP | Facility: HOSPITAL | Age: 69
End: 2024-02-26

## 2024-02-26 ENCOUNTER — APPOINTMENT (INPATIENT)
Dept: RADIOLOGY | Facility: HOSPITAL | Age: 69
DRG: 164 | End: 2024-02-26
Attending: INTERNAL MEDICINE
Payer: MEDICARE

## 2024-02-26 ENCOUNTER — ANESTHESIA (OUTPATIENT)
Dept: PERIOP | Facility: HOSPITAL | Age: 69
End: 2024-02-26

## 2024-02-26 ENCOUNTER — HOSPITAL ENCOUNTER (OUTPATIENT)
Dept: RADIOLOGY | Facility: HOSPITAL | Age: 69
Discharge: HOME | End: 2024-02-26
Payer: MEDICARE

## 2024-02-26 DIAGNOSIS — J44.9 COPD, VERY SEVERE (HCC): ICD-10-CM

## 2024-02-26 DIAGNOSIS — J44.9 COPD, SEVERE (HCC): ICD-10-CM

## 2024-02-26 LAB
ATRIAL RATE: 65 BPM
P AXIS: 83 DEGREES
PLATELET # BLD AUTO: 231 THOUSANDS/UL (ref 149–390)
PMV BLD AUTO: 9.6 FL (ref 8.9–12.7)
PR INTERVAL: 150 MS
QRS AXIS: 63 DEGREES
QRSD INTERVAL: 92 MS
QT INTERVAL: 398 MS
QTC INTERVAL: 413 MS
T WAVE AXIS: 74 DEGREES
VENTRICULAR RATE: 65 BPM

## 2024-02-26 PROCEDURE — 0BJ08ZZ INSPECTION OF TRACHEOBRONCHIAL TREE, VIA NATURAL OR ARTIFICIAL OPENING ENDOSCOPIC: ICD-10-PCS | Performed by: INTERNAL MEDICINE

## 2024-02-26 PROCEDURE — 71045 X-RAY EXAM CHEST 1 VIEW: CPT

## 2024-02-26 PROCEDURE — C1889 IMPLANT/INSERT DEVICE, NOC: HCPCS

## 2024-02-26 PROCEDURE — 0BHB8GZ INSERTION OF ENDOBRONCHIAL VALVE INTO LEFT LOWER LOBE BRONCHUS, VIA NATURAL OR ARTIFICIAL OPENING ENDOSCOPIC: ICD-10-PCS | Performed by: INTERNAL MEDICINE

## 2024-02-26 PROCEDURE — 93005 ELECTROCARDIOGRAM TRACING: CPT

## 2024-02-26 PROCEDURE — 31634 BRONCH W/BALLOON OCCLUSION: CPT | Performed by: INTERNAL MEDICINE

## 2024-02-26 PROCEDURE — 85049 AUTOMATED PLATELET COUNT: CPT | Performed by: INTERNAL MEDICINE

## 2024-02-26 PROCEDURE — 99233 SBSQ HOSP IP/OBS HIGH 50: CPT | Performed by: INTERNAL MEDICINE

## 2024-02-26 PROCEDURE — 93010 ELECTROCARDIOGRAM REPORT: CPT | Performed by: INTERNAL MEDICINE

## 2024-02-26 RX ORDER — PANTOPRAZOLE SODIUM 40 MG/1
40 TABLET, DELAYED RELEASE ORAL
Status: DISCONTINUED | OUTPATIENT
Start: 2024-02-26 | End: 2024-03-02 | Stop reason: HOSPADM

## 2024-02-26 RX ORDER — EPHEDRINE SULFATE 50 MG/ML
INJECTION INTRAVENOUS AS NEEDED
Status: DISCONTINUED | OUTPATIENT
Start: 2024-02-26 | End: 2024-02-26

## 2024-02-26 RX ORDER — ACETAMINOPHEN 325 MG/1
650 TABLET ORAL EVERY 6 HOURS PRN
Status: DISCONTINUED | OUTPATIENT
Start: 2024-02-26 | End: 2024-02-27 | Stop reason: SDUPTHER

## 2024-02-26 RX ORDER — DEXAMETHASONE SODIUM PHOSPHATE 10 MG/ML
INJECTION, SOLUTION INTRAMUSCULAR; INTRAVENOUS AS NEEDED
Status: DISCONTINUED | OUTPATIENT
Start: 2024-02-26 | End: 2024-02-26

## 2024-02-26 RX ORDER — LEVALBUTEROL INHALATION SOLUTION 1.25 MG/3ML
1.25 SOLUTION RESPIRATORY (INHALATION)
Status: DISCONTINUED | OUTPATIENT
Start: 2024-02-26 | End: 2024-02-26

## 2024-02-26 RX ORDER — BENZONATATE 100 MG/1
100 CAPSULE ORAL 3 TIMES DAILY
Status: DISCONTINUED | OUTPATIENT
Start: 2024-02-26 | End: 2024-02-26

## 2024-02-26 RX ORDER — HYDROMORPHONE HCL/PF 1 MG/ML
0.5 SYRINGE (ML) INJECTION
Status: CANCELLED | OUTPATIENT
Start: 2024-02-26

## 2024-02-26 RX ORDER — ALBUMIN, HUMAN INJ 5% 5 %
SOLUTION INTRAVENOUS CONTINUOUS PRN
Status: DISCONTINUED | OUTPATIENT
Start: 2024-02-26 | End: 2024-02-26

## 2024-02-26 RX ORDER — ROCURONIUM BROMIDE 10 MG/ML
INJECTION, SOLUTION INTRAVENOUS AS NEEDED
Status: DISCONTINUED | OUTPATIENT
Start: 2024-02-26 | End: 2024-02-26

## 2024-02-26 RX ORDER — SODIUM CHLORIDE, SODIUM LACTATE, POTASSIUM CHLORIDE, CALCIUM CHLORIDE 600; 310; 30; 20 MG/100ML; MG/100ML; MG/100ML; MG/100ML
INJECTION, SOLUTION INTRAVENOUS CONTINUOUS PRN
Status: DISCONTINUED | OUTPATIENT
Start: 2024-02-26 | End: 2024-02-26

## 2024-02-26 RX ORDER — LIDOCAINE HYDROCHLORIDE 20 MG/ML
INJECTION, SOLUTION EPIDURAL; INFILTRATION; INTRACAUDAL; PERINEURAL AS NEEDED
Status: COMPLETED | OUTPATIENT
Start: 2024-02-26 | End: 2024-02-26

## 2024-02-26 RX ORDER — ENOXAPARIN SODIUM 100 MG/ML
40 INJECTION SUBCUTANEOUS DAILY
Status: DISCONTINUED | OUTPATIENT
Start: 2024-02-26 | End: 2024-02-27

## 2024-02-26 RX ORDER — FENTANYL CITRATE 50 UG/ML
INJECTION, SOLUTION INTRAMUSCULAR; INTRAVENOUS AS NEEDED
Status: DISCONTINUED | OUTPATIENT
Start: 2024-02-26 | End: 2024-02-26

## 2024-02-26 RX ORDER — PROPOFOL 10 MG/ML
INJECTION, EMULSION INTRAVENOUS AS NEEDED
Status: DISCONTINUED | OUTPATIENT
Start: 2024-02-26 | End: 2024-02-26

## 2024-02-26 RX ORDER — AZITHROMYCIN 250 MG/1
250 TABLET, FILM COATED ORAL DAILY
Status: DISCONTINUED | OUTPATIENT
Start: 2024-02-27 | End: 2024-02-28

## 2024-02-26 RX ORDER — FENTANYL CITRATE/PF 50 MCG/ML
50 SYRINGE (ML) INJECTION
Status: CANCELLED | OUTPATIENT
Start: 2024-02-26

## 2024-02-26 RX ORDER — HYDROCODONE POLISTIREX AND CHLORPHENIRAMINE POLISTIREX 10; 8 MG/5ML; MG/5ML
5 SUSPENSION, EXTENDED RELEASE ORAL EVERY 12 HOURS PRN
Status: DISCONTINUED | OUTPATIENT
Start: 2024-02-26 | End: 2024-02-27 | Stop reason: SDUPTHER

## 2024-02-26 RX ORDER — ALBUTEROL SULFATE 2.5 MG/3ML
2.5 SOLUTION RESPIRATORY (INHALATION) ONCE AS NEEDED
Status: DISCONTINUED | OUTPATIENT
Start: 2024-02-26 | End: 2024-02-26

## 2024-02-26 RX ORDER — ONDANSETRON 2 MG/ML
4 INJECTION INTRAMUSCULAR; INTRAVENOUS ONCE AS NEEDED
Status: CANCELLED | OUTPATIENT
Start: 2024-02-26

## 2024-02-26 RX ORDER — CHLORHEXIDINE GLUCONATE ORAL RINSE 1.2 MG/ML
15 SOLUTION DENTAL EVERY 12 HOURS SCHEDULED
Status: DISCONTINUED | OUTPATIENT
Start: 2024-02-26 | End: 2024-03-02 | Stop reason: HOSPADM

## 2024-02-26 RX ORDER — AZITHROMYCIN 250 MG/1
250 TABLET, FILM COATED ORAL EVERY 24 HOURS
Status: DISCONTINUED | OUTPATIENT
Start: 2024-02-27 | End: 2024-02-26

## 2024-02-26 RX ORDER — BENZONATATE 100 MG/1
200 CAPSULE ORAL 3 TIMES DAILY
Status: DISCONTINUED | OUTPATIENT
Start: 2024-02-26 | End: 2024-02-27

## 2024-02-26 RX ORDER — LIDOCAINE HYDROCHLORIDE 10 MG/ML
INJECTION, SOLUTION EPIDURAL; INFILTRATION; INTRACAUDAL; PERINEURAL AS NEEDED
Status: DISCONTINUED | OUTPATIENT
Start: 2024-02-26 | End: 2024-02-26

## 2024-02-26 RX ORDER — OXYCODONE HYDROCHLORIDE AND ACETAMINOPHEN 5; 325 MG/1; MG/1
1 TABLET ORAL EVERY 6 HOURS PRN
Status: DISCONTINUED | OUTPATIENT
Start: 2024-02-26 | End: 2024-02-28

## 2024-02-26 RX ORDER — OXYCODONE HYDROCHLORIDE AND ACETAMINOPHEN 5; 325 MG/1; MG/1
0.5 TABLET ORAL ONCE
Status: DISCONTINUED | OUTPATIENT
Start: 2024-02-26 | End: 2024-02-26

## 2024-02-26 RX ORDER — OXYCODONE HYDROCHLORIDE AND ACETAMINOPHEN 5; 325 MG/1; MG/1
0.5 TABLET ORAL EVERY 6 HOURS PRN
Status: DISCONTINUED | OUTPATIENT
Start: 2024-02-26 | End: 2024-02-26

## 2024-02-26 RX ORDER — ONDANSETRON 2 MG/ML
INJECTION INTRAMUSCULAR; INTRAVENOUS AS NEEDED
Status: DISCONTINUED | OUTPATIENT
Start: 2024-02-26 | End: 2024-02-26

## 2024-02-26 RX ORDER — PREDNISONE 5 MG/1
5 TABLET ORAL DAILY
Status: DISCONTINUED | OUTPATIENT
Start: 2024-02-27 | End: 2024-03-02 | Stop reason: HOSPADM

## 2024-02-26 RX ORDER — GLYCOPYRROLATE 0.2 MG/ML
INJECTION INTRAMUSCULAR; INTRAVENOUS AS NEEDED
Status: DISCONTINUED | OUTPATIENT
Start: 2024-02-26 | End: 2024-02-26

## 2024-02-26 RX ORDER — SUCCINYLCHOLINE/SOD CL,ISO/PF 100 MG/5ML
SYRINGE (ML) INTRAVENOUS AS NEEDED
Status: DISCONTINUED | OUTPATIENT
Start: 2024-02-26 | End: 2024-02-26

## 2024-02-26 RX ORDER — PROPOFOL 10 MG/ML
INJECTION, EMULSION INTRAVENOUS CONTINUOUS PRN
Status: DISCONTINUED | OUTPATIENT
Start: 2024-02-26 | End: 2024-02-26

## 2024-02-26 RX ORDER — ALBUTEROL SULFATE 90 UG/1
2 AEROSOL, METERED RESPIRATORY (INHALATION) EVERY 4 HOURS PRN
Status: DISCONTINUED | OUTPATIENT
Start: 2024-02-26 | End: 2024-03-02 | Stop reason: HOSPADM

## 2024-02-26 RX ADMIN — ENOXAPARIN SODIUM 40 MG: 40 INJECTION SUBCUTANEOUS at 12:39

## 2024-02-26 RX ADMIN — BENZONATATE 100 MG: 100 CAPSULE ORAL at 12:48

## 2024-02-26 RX ADMIN — Medication 100 MG: at 11:05

## 2024-02-26 RX ADMIN — SODIUM CHLORIDE, SODIUM LACTATE, POTASSIUM CHLORIDE, AND CALCIUM CHLORIDE: .6; .31; .03; .02 INJECTION, SOLUTION INTRAVENOUS at 10:53

## 2024-02-26 RX ADMIN — ALBUMIN (HUMAN): 12.5 INJECTION, SOLUTION INTRAVENOUS at 11:36

## 2024-02-26 RX ADMIN — SUGAMMADEX 200 MG: 100 INJECTION, SOLUTION INTRAVENOUS at 11:55

## 2024-02-26 RX ADMIN — HYDROCODONE POLISTIREX AND CHLORPHENIRAMINE POLISTIREX 5 ML: 10; 8 SUSPENSION, EXTENDED RELEASE ORAL at 12:38

## 2024-02-26 RX ADMIN — ONDANSETRON 4 MG: 2 INJECTION INTRAMUSCULAR; INTRAVENOUS at 11:08

## 2024-02-26 RX ADMIN — LIDOCAINE HYDROCHLORIDE 50 MG: 10 INJECTION, SOLUTION EPIDURAL; INFILTRATION; INTRACAUDAL; PERINEURAL at 11:05

## 2024-02-26 RX ADMIN — BENZONATATE 200 MG: 100 CAPSULE ORAL at 19:21

## 2024-02-26 RX ADMIN — FENTANYL CITRATE 100 MCG: 50 INJECTION INTRAMUSCULAR; INTRAVENOUS at 11:05

## 2024-02-26 RX ADMIN — EPHEDRINE SULFATE 5 MG: 50 INJECTION, SOLUTION INTRAVENOUS at 11:34

## 2024-02-26 RX ADMIN — SODIUM CHLORIDE, SODIUM LACTATE, POTASSIUM CHLORIDE, AND CALCIUM CHLORIDE: .6; .31; .03; .02 INJECTION, SOLUTION INTRAVENOUS at 11:45

## 2024-02-26 RX ADMIN — PROPOFOL 150 MG: 10 INJECTION, EMULSION INTRAVENOUS at 11:05

## 2024-02-26 RX ADMIN — ROCURONIUM BROMIDE 10 MG: 10 INJECTION, SOLUTION INTRAVENOUS at 11:43

## 2024-02-26 RX ADMIN — OXYCODONE HYDROCHLORIDE AND ACETAMINOPHEN 0.5 TABLET: 5; 325 TABLET ORAL at 14:38

## 2024-02-26 RX ADMIN — PROPOFOL 120 MCG/KG/MIN: 10 INJECTION, EMULSION INTRAVENOUS at 11:07

## 2024-02-26 RX ADMIN — DEXAMETHASONE SODIUM PHOSPHATE 10 MG: 10 INJECTION, SOLUTION INTRAMUSCULAR; INTRAVENOUS at 11:08

## 2024-02-26 RX ADMIN — EPHEDRINE SULFATE 5 MG: 50 INJECTION, SOLUTION INTRAVENOUS at 11:21

## 2024-02-26 RX ADMIN — OXYCODONE HYDROCHLORIDE AND ACETAMINOPHEN 1 TABLET: 5; 325 TABLET ORAL at 21:14

## 2024-02-26 RX ADMIN — EPHEDRINE SULFATE 5 MG: 50 INJECTION, SOLUTION INTRAVENOUS at 11:37

## 2024-02-26 RX ADMIN — ALBUMIN (HUMAN): 12.5 INJECTION, SOLUTION INTRAVENOUS at 11:45

## 2024-02-26 RX ADMIN — LIDOCAINE HYDROCHLORIDE 6 ML: 20 INJECTION, SOLUTION EPIDURAL; INFILTRATION; INTRACAUDAL at 11:47

## 2024-02-26 RX ADMIN — GLYCOPYRROLATE 0.2 MG: 0.2 INJECTION, SOLUTION INTRAMUSCULAR; INTRAVENOUS at 11:41

## 2024-02-26 RX ADMIN — CHLORHEXIDINE GLUCONATE 15 ML: 1.2 SOLUTION ORAL at 21:16

## 2024-02-26 RX ADMIN — Medication 2.5 MG: at 21:14

## 2024-02-26 RX ADMIN — ROCURONIUM BROMIDE 50 MG: 10 INJECTION, SOLUTION INTRAVENOUS at 11:07

## 2024-02-26 NOTE — ANESTHESIA PREPROCEDURE EVALUATION
Procedure:  PRE-OP ONLY    Relevant Problems   CARDIO   (+) Hyperlipidemia      GI/HEPATIC   (+) Gastroesophageal reflux disease with esophagitis without hemorrhage      /RENAL   (+) BPH (benign prostatic hyperplasia)      MUSCULOSKELETAL   (+) Chronic back pain   (+) Sciatic leg pain      NEURO/PSYCH   (+) Bipolar affective disorder, depressed, severe (HCC)   (+) Chronic back pain   (+) Generalized anxiety disorder      PULMONARY   (+) Bullous emphysema (HCC)   (+) COPD, very severe (HCC)      Cardiovascular and Mediastinum   (+) Takotsubo cardiomyopathy      Respiratory   (+) Pulmonary nodule seen on imaging study      Musculoskeletal and Integument   (+) Ruptured disk             Anesthesia Plan  ASA Score- 3     Anesthesia Type- general with ASA Monitors.         Additional Monitors:     Airway Plan: ETT.    Comment: Worse pulm fnx w/ bronchodialator; NO PREOP NEB  Discuss MATTEO  TIVA + Relaxant  Decadron 10.       Plan Factors-    Chart reviewed. EKG reviewed. Imaging results reviewed. Existing labs reviewed. Patient summary reviewed.    Patient is not a current smoker.              Induction- intravenous.    Postoperative Plan- . Planned trial extubation    Informed Consent-           VITALS  There were no vitals taken for this visit.  BP Readings from Last 3 Encounters:   02/20/24 122/76   02/20/24 121/84   01/18/24 112/76     LABS  Results from Last 12 Months   Lab Units 11/15/23  0320 11/14/23  0400 11/13/23  0321 11/12/23  0848 11/12/23  0159 11/08/23  0953 09/28/23  1058   SODIUM mmol/L 138 136 135  --  135   < > 137   POTASSIUM mmol/L 4.1 4.4 5.0  --  4.2   < > 4.2   CHLORIDE mmol/L 103 101 106  --  99*   < > 102   CO2 mmol/L 30 32* 21  --  26   < > 28   ANION GAP  5 3 8  --  10   < > 7   BUN mg/dL 24 23 22  --  26   < > 16   CREATININE mg/dL 0.81 0.77 0.91  --  0.93   < > 0.79   CALCIUM mg/dL 9.0 9.3 9.2  --  9.7   < > 9.7   GLUCOSE RANDOM mg/dL 100* 101* 139*  --  296*   < > 82   AST U/L  --   --   --   " --  18  --  17   ALT U/L  --   --   --   --  16  --  21   ALK PHOS U/L  --   --   --   --  58  --  57   TOTAL BILIRUBIN mg/dL  --   --   --   --  0.3  --  0.5   ALBUMIN g/dL  --   --   --   --  4.5  --  4.4   HEMOGLOBIN A1C %  --   --  5.6 5.8*  --   --   --     < > = values in this interval not displayed.     No results for input(s): \"WBC\", \"HGB\", \"HCT\", \"PLT\" in the last 8784 hours.  No results for input(s): \"APTT\", \"INR\", \"PTT\" in the last 8784 hours.    ECG  2015  Normal sinus rhythm  Normal ECG  No previous ECGs available  Confirmed by DO LU GERALD ([id number]) on 4/8/2015 9:12:59 AM    ECHOCARDIOGRAPHY AND OTHER TESTING/IMAGING  1/2024 TTE    History    Severe COPD, Takotsubo cardiomyopathy     Interpretation Summary       •  Left Ventricle: Left ventricular cavity size is normal. Wall thickness is normal. The left ventricular ejection fraction is 61% by biplane measurement. Systolic function is normal. Wall motion is normal. Diastolic function is normal.  Left atrial filling pressure is normal.  •  Prior TTE study available for comparison. Prior study date: 11/16/2023. Changes noted when compared to prior study. Changes include: Previous echocardiogram demonstrated: LV EF 40-45%.  Hypokinesis of the distal walls and apex consistent with Takotsubo syndrome.. Prior done at [facility].    ANESTHESIA RISK-BENEFIT DISCUSSION  BENEFITS INCLUDE (NBK 257126, PMID 64943392):   (1) A specialized anesthesia team reduces mortality and morbidity for major surgeries.  (2) The team provides analgesia/sedation/amnesia/akinesia as safely as possible.  (3) The team strives to reduce discomfort as much as reasonably possible.    RISKS ASSESSMENT (AND PLANS TO MITIGATE RISKS) INCLUDE:    Neurologic system: IntraOp awareness (Risk is ~1:1,000 - 1:14,000; PMID 02256271), Stroke (Risk ~<0.1-2% for most cases; PMID 51220158), nerve injury, vision loss, and POCD.     Airway and Pulmonary system: Dental or mouth injury, throat pain, " critical hypoxia, pneumothorax, prolonged intubation, post-op respiratory compromise.  Airway/Intubation risks and prior data: Ventilated by mask (1); Cuffed, Oral; 8 mm; Direct laryngoscopy, Stylet, Cricoid Pressure; Mac; 3; Auscultation, End tidal CO2, Symmetrical chest wall movement, Equal   Major ARISCAT risk factors for pulmonary complications include: Intrathoracic Surgery: 2 pts and Other factors/Clinical gestalt: 1 pt, yielding a score 2-3= Intermediate risk, 13.3%.  Cardiovascular system: Hypotension/Vasoplegia, arrhythmias, blood clot, chauncey-op cardiac injury/MACE, bleeding, infection, or injury to vascular structures.  Signs of active, severe cardiac instability: none  Victor M's RCRI score items: Surgical risk, yielding an RCRI Score of 1= 0.6% risk of MACE  Are chauncey-op or intra-op beta blockers indicated? (PMID [id number]): no  FEN/GI system: Aspiration risk (~0.5% [facility] [id number]) and PONV (10-80% per Apfel score).  ASA NPO guideline compliance?: pending day of surgery eval  Medication risk assessment: Allergic reactions, bleeding due to anticoagulant use, overdoses, drug-drug interactions, injury to a fetus or  in pregnant or breastfeeding patients, sedation while driving/operating heavy machinery.  Recent relevant medications: yes: Steroids  Personal or family history of anesthesia complications: no  Pregnancy Status: N/A  Estimate mortality risks associated with anesthesia based on ASA-PS (PMID [id number]): ASA-PS III: 1:3,500

## 2024-02-26 NOTE — INTERVAL H&P NOTE
H&P reviewed. After examining the patient I find no changes in the patients condition since the H&P had been written.    Vitals:    02/26/24 0931   BP: (!) 156/105   Pulse: (!) 112   Temp: 98.2 °F (36.8 °C)   SpO2: 95%     Assessment/Plan:    Severe COPD/emphysema with hyperinflation    Patient has completed the workup for bronchoscopic lung volume reduction.  Patient has met all inclusion criteria. Based on emphysema distribution, perfusion and PFTs, we will proceed with bronchoscopy for airway inspection, followed by Chartis assessment to assess for collateral ventilation.  Assuming collateral ventilation is absent, primary target lobe is the LLL.  Secondary target lobe is N/A.  Patient understands if collateral ventilation is present, valves will not be placed.  Risks were discussed in detail with the patient.  Most notable risk is pneumothorax in up to 30% of patients.  Pneumothorax cart will travel with patient throughout hospitalization.  Additional risks include COPD exacerbation, respiratory failure, need for increased oxygen flow rate, atelectasis and pneumonia.  Patient agrees and consent has been signed. Patient was also consented for chest tube if needed.      
(1) bedfast

## 2024-02-26 NOTE — PROGRESS NOTES
[facility]  Progress Note: Critical Care  Name: Samuel Flores Jr. 68 y.o. male I MRN: [id number]  Unit/Bed#: MICU 13 I Date of Admission: 2/26/2024   Date of Service: 2/26/2024 I Hospital Day: 0    Assessment/Plan     ASSESSMENT:    Severe COPD/emphysema s/p BLVR  Hypertension  GERD  Anxiety disorder  BPH    PLAN:  Neuro:  Diagnoses: History of anxiety disorder  -Not on any home medications for this  -Continue to monitor    Analgesia  -Has history of chronic low back pain  -On PRN percocet at home for this  -Currently on tylenol 650mg Q6H PRN for mild pain  -Percocet 5-325 mg 0.5 tablet Q6H PRN    CV:    Diagnoses: Hypertension  -On losartan 12.5 mg daily, will hold for now  -Can be restarted tomorrow if hypertensive    Pulm:    Diagnoses: Severe COPD s/p BLVR  SpO2 Trend: 96-99%,   Imaging: CXR reviewed, Plan to continue with 3 days serial CXRs  Plan: Continuous pulse oximetry. Incentive spirometry. Pulmonary toilet. Maintain O2 sat >90%.   -Underwent BLVR at this hospitalization for left lower lobe lung volume reduction  - On tessalon perles 200 mg TID  - On tussionex suspension 5 mL Q12H PRN  Supplemental O2:  On nasal cannula. Wean as tolerated  -At baseline, patient is on 5mg prednisone daily for exacerbation prevention, dyspnea  -Tobacco free since September 2023    GI:  Diagnoses: GERD  GI prophylaxis: On pantoprazole 40 mg twice daily    :  Diagnoses: BPH.   Plan:   -On finasteride and tamsulosin at home  -Can consider restarting tomorrow    F/E/N:    - F: Not on any IVF at this time. Fluid resuscitation prn.  - E: Monitor and replete electrolytes for Mg >2, Phos >3, K >4.  - N: On regular house diet    Endo:    Diagnoses: No active diagnosis     Heme:  Diagnosis: No active diagnosis  VTE prophylaxis: On DVT prophylaxis with lovenox. Sequential compression devices and/or foot pumps.    ID:  Diagnoses: No active diagnosis    MSK/Skin:  Plan: Frequent turning and  repositioning. Pressure injury prevention. Monitor for skin breakdown. PT/OT when appropriate. Encourage OOB and ambulation when appropriate. Local wound care prn.      Disposition: Critical care    ICU Core Measures     A: Assess, Prevent, and Manage Pain Has pain been assessed? Yes  Need for changes to pain regimen? No   B: Both SAT/SAT  N/A   C: Choice of Sedation RASS Goal: N/A patient not on sedation  Need for changes to sedation or analgesia regimen? No   D: Delirium CAM-ICU: Negative   E: Early Mobility  Plan for early mobility? Yes   F: Family Engagement Plan for family engagement today? Yes         Prophylaxis:  VTE VTE covered by:  enoxaparin, Subcutaneous, 40 mg at 02/26/24 1239       Stress Ulcer  not orderedcovered bypantoprazole (PROTONIX) 40 mg tablet [[id number]] (Long-Term Med), pantoprazole (PROTONIX) 40 mg tablet [[id number]] (Long-Term Med)        Significant 24hr Events     24hr events/Hospital Summary:   Patient is a 68-year-old male with history of severe COPD, anxiety disorder, BPH, GERD, hypertension.  Patient has been following with pulmonary in the outpatient setting and has had persistent daily symptoms including shortness of breath and limited ambulation as a result.  He has been trialed on maintenance inhalers and nebulizer therapy and does not feel that it improves his breathing.  He is admitted to the intensive care unit for monitoring after BL VR for his history of severe COPD.  Earlier today, he underwent a BLVR.  Patient currently reporting of no acute symptoms, discussed medical updates and plan with the patient by bedside.     Subjective   Please see above    Review of Systems   Constitutional:  Negative for chills and fever.   Respiratory:  Negative for chest tightness.    Cardiovascular:  Negative for chest pain and palpitations.   Gastrointestinal:  Negative for abdominal pain, nausea and vomiting.   Neurological:  Negative for dizziness and headaches.        Objective                             Vitals I/O      Most Recent Min/Max in 24hrs   Temp 98.2 °F (36.8 °C) Temp  Min: 98.2 °F (36.8 °C)  Max: 98.2 °F (36.8 °C)   Pulse 85 Pulse  Min: 85  Max: 112   Resp (!) 35 Resp  Min: 17  Max: 43   /79 BP  Min: 118/58  Max: 168/69   O2 Sat 99 % SpO2  Min: 90 %  Max: 99 %      Intake/Output Summary (Last 24 hours) at 2/26/2024 1444  Last data filed at 2/26/2024 1155  Gross per 24 hour   Intake 1500 ml   Output --   Net 1500 ml       Diet NPO; Sips with meds    Invasive Monitoring   Arterial Line  Whiting BP    No data recorded   MAP    No data recorded           Physical Exam   Physical Exam  Vitals reviewed.   Skin:     General: Skin is warm.      Capillary Refill: Capillary refill takes less than 2 seconds.   HENT:      Head: Normocephalic.   Cardiovascular:      Rate and Rhythm: Normal rate and regular rhythm.      Pulses: Normal pulses.   Abdominal: General: Bowel sounds are normal.      Palpations: Abdomen is soft.   Pulmonary:      Effort: Pulmonary effort is normal.      Breath sounds: Normal breath sounds.   Neurological:      Mental Status: He is alert and oriented to person, place and time.   Genitourinary/Anorectal:  Christian present.          Diagnostic Studies      EKG: Reviewed  Imaging: Reviewed I have personally reviewed pertinent reports.       Medications:  Scheduled PRN   benzonatate, 100 mg, TID  chlorhexidine, 15 mL, Q12H RUFINA  enoxaparin, 40 mg, Daily      acetaminophen, 650 mg, Q6H PRN  Hydrocod David-Chlorphe David ER, 5 mL, Q12H PRN  oxyCODONE-acetaminophen, 0.5 tablet, Q6H PRN       Continuous          Labs:    CBC    Recent Labs     02/26/24  1302        BMP    No recent results    Coags    No recent results     Additional Electrolytes  No recent results       Blood Gas    No recent results  No recent results LFTs  No recent results    Infectious  No recent results  Glucose  No recent results            Rivas Hernandez MD

## 2024-02-26 NOTE — ANESTHESIA PREPROCEDURE EVALUATION
"Procedure:  BRONCHOSCOPY W/ BRONCHOSCOPIC LUNG VOLUME REDUCTION    Relevant Problems   No relevant active problems        Physical Exam    Airway    Mallampati score: I  TM Distance: >3 FB  Neck ROM: limited     Dental        Cardiovascular      Pulmonary   Decreased breath sounds, No wheezes    Other Findings        Anesthesia Plan  ASA Score- 4     Anesthesia Type- general with ASA Monitors.         Additional Monitors:     Airway Plan: ETT.    Comment: Worse pulm fnx w/ bronchodialator; NO PREOP NEB  TIVA + Relaxant  Decadron 10.       Plan Factors-    Chart reviewed. EKG reviewed. Imaging results reviewed. Existing labs reviewed. Patient summary reviewed.    Patient is not a current smoker.  Patient did not smoke on day of surgery.            Induction- intravenous.    Postoperative Plan- . Planned trial extubation    Informed Consent- Anesthetic plan and risks discussed with patient.  I personally reviewed this patient with the CRNA. Discussed and agreed on the Anesthesia Plan with the CRNA..            VITALS  BP (!) 156/105   Pulse (!) 112   Temp 98.2 °F (36.8 °C) (Temporal)   Ht 5' 9\" (1.753 m)   Wt 58.1 kg (128 lb)   SpO2 95%   BMI 18.90 kg/m²   BP Readings from Last 3 Encounters:   02/26/24 (!) 156/105   02/20/24 122/76   02/20/24 121/84     LABS  Results from Last 12 Months   Lab Units 11/15/23  0320 11/14/23  0400 11/13/23  0321 11/12/23  0848 11/12/23  0159 11/08/23  0953 09/28/23  1058   SODIUM mmol/L 138 136 135  --  135   < > 137   POTASSIUM mmol/L 4.1 4.4 5.0  --  4.2   < > 4.2   CHLORIDE mmol/L 103 101 106  --  99*   < > 102   CO2 mmol/L 30 32* 21  --  26   < > 28   ANION GAP  5 3 8  --  10   < > 7   BUN mg/dL 24 23 22  --  26   < > 16   CREATININE mg/dL 0.81 0.77 0.91  --  0.93   < > 0.79   CALCIUM mg/dL 9.0 9.3 9.2  --  9.7   < > 9.7   GLUCOSE RANDOM mg/dL 100* 101* 139*  --  296*   < > 82   AST U/L  --   --   --   --  18  --  17   ALT U/L  --   --   --   --  16  --  21   ALK PHOS U/L  --   " "--   --   --  58  --  57   TOTAL BILIRUBIN mg/dL  --   --   --   --  0.3  --  0.5   ALBUMIN g/dL  --   --   --   --  4.5  --  4.4   HEMOGLOBIN A1C %  --   --  5.6 5.8*  --   --   --     < > = values in this interval not displayed.     No results for input(s): \"WBC\", \"HGB\", \"HCT\", \"PLT\" in the last 8784 hours.  No results for input(s): \"APTT\", \"INR\", \"PTT\" in the last 8784 hours.    ECG  2015  Normal sinus rhythm  Normal ECG  No previous ECGs available  Confirmed by DO LU GERALD ([id number]) on 4/8/2015 9:12:59 AM    ECHOCARDIOGRAPHY AND OTHER TESTING/IMAGING  1/2024 TTE    History    Severe COPD, Takotsubo cardiomyopathy     Interpretation Summary         Left Ventricle: Left ventricular cavity size is normal. Wall thickness is normal. The left ventricular ejection fraction is 61% by biplane measurement. Systolic function is normal. Wall motion is normal. Diastolic function is normal.  Left atrial filling pressure is normal.    Prior TTE study available for comparison. Prior study date: 11/16/2023. Changes noted when compared to prior study. Changes include: Previous echocardiogram demonstrated: LV EF 40-45%.  Hypokinesis of the distal walls and apex consistent with Takotsubo syndrome.. Prior done at [facility].    ANESTHESIA RISK-BENEFIT DISCUSSION  BENEFITS INCLUDE (NBK 294304, PMID [id number]):   (1) A specialized anesthesia team reduces mortality and morbidity for major surgeries.  (2) The team provides analgesia/sedation/amnesia/akinesia as safely as possible.  (3) The team strives to reduce discomfort as much as reasonably possible.    RISKS ASSESSMENT (AND PLANS TO MITIGATE RISKS) INCLUDE:    Neurologic system: IntraOp awareness (Risk is ~1:1,000 - 1:14,000; PMID 30145161), Stroke (Risk ~<0.1-2% for most cases; PMID 78330018), nerve injury, vision loss, and POCD.     Airway and Pulmonary system: Dental or mouth injury, throat pain, critical hypoxia, pneumothorax, prolonged intubation, post-op respiratory " compromise.  Airway/Intubation risks and prior data: Ventilated by mask (1); Cuffed, Oral; 8 mm; Direct laryngoscopy, Stylet, Cricoid Pressure; Mac; 3; Auscultation, End tidal CO2, Symmetrical chest wall movement, Equal   Major ARISCAT risk factors for pulmonary complications include: Intrathoracic Surgery: 2 pts and Other factors/Clinical gestalt: 1 pt, yielding a score 2-3= Intermediate risk, 13.3%.  Cardiovascular system: Hypotension/Vasoplegia, arrhythmias, blood clot, chauncey-op cardiac injury/MACE, bleeding, infection, or injury to vascular structures.  Signs of active, severe cardiac instability: none  Victor M's RCRI score items: Surgical risk, yielding an RCRI Score of 1= 0.6% risk of MACE  Are chauncey-op or intra-op beta blockers indicated? (PMID 50698957): no  FEN/GI system: Aspiration risk (~0.5% [facility] [id number]) and PONV (10-80% per Apfel score).  ASA NPO guideline compliance?: yes  Medication risk assessment: Allergic reactions, bleeding due to anticoagulant use, overdoses, drug-drug interactions, injury to a fetus or  in pregnant or breastfeeding patients, sedation while driving/operating heavy machinery.  Recent relevant medications: yes: Steroids  Personal or family history of anesthesia complications: no  Pregnancy Status: N/A  Estimate mortality risks associated with anesthesia based on ASA-PS (PMID [id number]): ASA-PS IV: risk 1:1,800

## 2024-02-26 NOTE — ANESTHESIA POSTPROCEDURE EVALUATION
Post-Op Assessment Note    CV Status:  Stable  Pain Score: 0    Pain management: adequate       Mental Status:  Arousable   Hydration Status:  Stable   PONV Controlled:  None   Airway Patency:  Patent     Post Op Vitals Reviewed: Yes    No anethesia notable event occurred.    Staff: Anesthesiologist, CRNA               BP   108/65   Temp 98   Pulse 105   Resp 20   SpO2 91    Pt transported to ICU, monitors, O2 via simple mask, VSS, bedisde report given

## 2024-02-27 ENCOUNTER — APPOINTMENT (INPATIENT)
Dept: RADIOLOGY | Facility: HOSPITAL | Age: 69
DRG: 164 | End: 2024-02-27
Payer: MEDICARE

## 2024-02-27 LAB
ANION GAP SERPL CALCULATED.3IONS-SCNC: 6 MMOL/L
BUN SERPL-MCNC: 18 MG/DL (ref 5–25)
CALCIUM SERPL-MCNC: 9.4 MG/DL (ref 8.4–10.2)
CHLORIDE SERPL-SCNC: 102 MMOL/L (ref 96–108)
CO2 SERPL-SCNC: 30 MMOL/L (ref 21–32)
CREAT SERPL-MCNC: 0.8 MG/DL (ref 0.6–1.3)
ERYTHROCYTE [DISTWIDTH] IN BLOOD BY AUTOMATED COUNT: 12.7 % (ref 11.6–15.1)
GFR SERPL CREATININE-BSD FRML MDRD: 90 ML/MIN/1.73SQ M
GLUCOSE SERPL-MCNC: 139 MG/DL (ref 65–140)
HCT VFR BLD AUTO: 39 % (ref 36.5–49.3)
HGB BLD-MCNC: 12.5 G/DL (ref 12–17)
MAGNESIUM SERPL-MCNC: 1.8 MG/DL (ref 1.9–2.7)
MCH RBC QN AUTO: 30.1 PG (ref 26.8–34.3)
MCHC RBC AUTO-ENTMCNC: 32.1 G/DL (ref 31.4–37.4)
MCV RBC AUTO: 94 FL (ref 82–98)
PHOSPHATE SERPL-MCNC: 3.3 MG/DL (ref 2.3–4.1)
PLATELET # BLD AUTO: 227 THOUSANDS/UL (ref 149–390)
PMV BLD AUTO: 9.6 FL (ref 8.9–12.7)
POTASSIUM SERPL-SCNC: 4.4 MMOL/L (ref 3.5–5.3)
RBC # BLD AUTO: 4.15 MILLION/UL (ref 3.88–5.62)
SODIUM SERPL-SCNC: 138 MMOL/L (ref 135–147)
WBC # BLD AUTO: 10.32 THOUSAND/UL (ref 4.31–10.16)

## 2024-02-27 PROCEDURE — 84100 ASSAY OF PHOSPHORUS: CPT | Performed by: STUDENT IN AN ORGANIZED HEALTH CARE EDUCATION/TRAINING PROGRAM

## 2024-02-27 PROCEDURE — NC001 PR NO CHARGE: Performed by: INTERNAL MEDICINE

## 2024-02-27 PROCEDURE — 71045 X-RAY EXAM CHEST 1 VIEW: CPT

## 2024-02-27 PROCEDURE — 80048 BASIC METABOLIC PNL TOTAL CA: CPT | Performed by: STUDENT IN AN ORGANIZED HEALTH CARE EDUCATION/TRAINING PROGRAM

## 2024-02-27 PROCEDURE — 83735 ASSAY OF MAGNESIUM: CPT | Performed by: STUDENT IN AN ORGANIZED HEALTH CARE EDUCATION/TRAINING PROGRAM

## 2024-02-27 PROCEDURE — 99233 SBSQ HOSP IP/OBS HIGH 50: CPT | Performed by: INTERNAL MEDICINE

## 2024-02-27 PROCEDURE — 85027 COMPLETE CBC AUTOMATED: CPT | Performed by: STUDENT IN AN ORGANIZED HEALTH CARE EDUCATION/TRAINING PROGRAM

## 2024-02-27 PROCEDURE — 97163 PT EVAL HIGH COMPLEX 45 MIN: CPT

## 2024-02-27 PROCEDURE — 97166 OT EVAL MOD COMPLEX 45 MIN: CPT

## 2024-02-27 RX ORDER — MAGNESIUM SULFATE HEPTAHYDRATE 40 MG/ML
2 INJECTION, SOLUTION INTRAVENOUS ONCE
Status: DISCONTINUED | OUTPATIENT
Start: 2024-02-27 | End: 2024-02-27

## 2024-02-27 RX ORDER — LIDOCAINE 50 MG/G
1 PATCH TOPICAL DAILY
Status: DISCONTINUED | OUTPATIENT
Start: 2024-02-27 | End: 2024-03-02 | Stop reason: HOSPADM

## 2024-02-27 RX ORDER — HYDROCODONE POLISTIREX AND CHLORPHENIRAMINE POLISTIREX 10; 8 MG/5ML; MG/5ML
5 SUSPENSION, EXTENDED RELEASE ORAL EVERY 12 HOURS PRN
Status: DISCONTINUED | OUTPATIENT
Start: 2024-02-27 | End: 2024-03-02 | Stop reason: HOSPADM

## 2024-02-27 RX ORDER — ACETAMINOPHEN 325 MG/1
650 TABLET ORAL EVERY 6 HOURS PRN
Status: DISCONTINUED | OUTPATIENT
Start: 2024-02-27 | End: 2024-03-02 | Stop reason: HOSPADM

## 2024-02-27 RX ORDER — ENOXAPARIN SODIUM 100 MG/ML
40 INJECTION SUBCUTANEOUS DAILY
Status: DISCONTINUED | OUTPATIENT
Start: 2024-02-27 | End: 2024-03-02 | Stop reason: HOSPADM

## 2024-02-27 RX ORDER — LANOLIN ALCOHOL/MO/W.PET/CERES
400 CREAM (GRAM) TOPICAL 2 TIMES DAILY
Status: COMPLETED | OUTPATIENT
Start: 2024-02-27 | End: 2024-02-27

## 2024-02-27 RX ORDER — AMOXICILLIN 250 MG
1 CAPSULE ORAL
Status: DISCONTINUED | OUTPATIENT
Start: 2024-02-27 | End: 2024-03-02 | Stop reason: HOSPADM

## 2024-02-27 RX ORDER — BENZONATATE 100 MG/1
100 CAPSULE ORAL 3 TIMES DAILY
Status: DISCONTINUED | OUTPATIENT
Start: 2024-02-27 | End: 2024-03-02 | Stop reason: HOSPADM

## 2024-02-27 RX ORDER — FLUTICASONE FUROATE AND VILANTEROL 200; 25 UG/1; UG/1
1 POWDER RESPIRATORY (INHALATION) DAILY
Status: DISCONTINUED | OUTPATIENT
Start: 2024-02-27 | End: 2024-03-02 | Stop reason: HOSPADM

## 2024-02-27 RX ORDER — POLYETHYLENE GLYCOL 3350 17 G/17G
17 POWDER, FOR SOLUTION ORAL DAILY
Status: DISCONTINUED | OUTPATIENT
Start: 2024-02-27 | End: 2024-03-02 | Stop reason: HOSPADM

## 2024-02-27 RX ADMIN — OXYCODONE HYDROCHLORIDE AND ACETAMINOPHEN 1 TABLET: 5; 325 TABLET ORAL at 09:12

## 2024-02-27 RX ADMIN — Medication 2.5 MG: at 20:45

## 2024-02-27 RX ADMIN — Medication 2.5 MG: at 14:37

## 2024-02-27 RX ADMIN — ENOXAPARIN SODIUM 40 MG: 40 INJECTION SUBCUTANEOUS at 08:20

## 2024-02-27 RX ADMIN — BENZONATATE 100 MG: 100 CAPSULE ORAL at 20:45

## 2024-02-27 RX ADMIN — CHLORHEXIDINE GLUCONATE 15 ML: 1.2 SOLUTION ORAL at 20:45

## 2024-02-27 RX ADMIN — PANTOPRAZOLE SODIUM 40 MG: 40 TABLET, DELAYED RELEASE ORAL at 15:17

## 2024-02-27 RX ADMIN — POLYETHYLENE GLYCOL 3350 17 G: 17 POWDER, FOR SOLUTION ORAL at 08:22

## 2024-02-27 RX ADMIN — OXYCODONE HYDROCHLORIDE AND ACETAMINOPHEN 1 TABLET: 5; 325 TABLET ORAL at 14:37

## 2024-02-27 RX ADMIN — MAGNESIUM OXIDE TAB 400 MG (241.3 MG ELEMENTAL MG) 400 MG: 400 (241.3 MG) TAB at 17:41

## 2024-02-27 RX ADMIN — OXYCODONE HYDROCHLORIDE AND ACETAMINOPHEN 1 TABLET: 5; 325 TABLET ORAL at 03:15

## 2024-02-27 RX ADMIN — OXYCODONE HYDROCHLORIDE AND ACETAMINOPHEN 1 TABLET: 5; 325 TABLET ORAL at 20:45

## 2024-02-27 RX ADMIN — Medication 2.5 MG: at 09:12

## 2024-02-27 RX ADMIN — HYDROCODONE POLISTIREX AND CHLORPHENIRAMINE POLISTIREX 5 ML: 10; 8 SUSPENSION, EXTENDED RELEASE ORAL at 03:23

## 2024-02-27 RX ADMIN — Medication 2.5 MG: at 03:17

## 2024-02-27 RX ADMIN — PREDNISONE 5 MG: 5 TABLET ORAL at 08:21

## 2024-02-27 RX ADMIN — BENZONATATE 100 MG: 100 CAPSULE ORAL at 08:20

## 2024-02-27 RX ADMIN — PANTOPRAZOLE SODIUM 40 MG: 40 TABLET, DELAYED RELEASE ORAL at 08:23

## 2024-02-27 RX ADMIN — BENZONATATE 100 MG: 100 CAPSULE ORAL at 15:17

## 2024-02-27 RX ADMIN — LIDOCAINE 5% 1 PATCH: 700 PATCH TOPICAL at 13:28

## 2024-02-27 RX ADMIN — SENNOSIDES, DOCUSATE SODIUM 1 TABLET: 8.6; 5 TABLET ORAL at 17:41

## 2024-02-27 RX ADMIN — CHLORHEXIDINE GLUCONATE 15 ML: 1.2 SOLUTION ORAL at 08:27

## 2024-02-27 RX ADMIN — AZITHROMYCIN DIHYDRATE 250 MG: 250 TABLET, FILM COATED ORAL at 08:20

## 2024-02-27 RX ADMIN — MAGNESIUM OXIDE TAB 400 MG (241.3 MG ELEMENTAL MG) 400 MG: 400 (241.3 MG) TAB at 08:51

## 2024-02-27 NOTE — CASE MANAGEMENT
Case Management Assessment & Discharge Planning Note    Patient name Samuel Flores Jr.  Location [facility] 03/MICU 03 MRN [id number]  : 1955 Date 2024       Current Admission Date: 2024  Current Admission Diagnosis:COPD, very severe (HCC)   Patient Active Problem List    Diagnosis Date Noted    Gastroesophageal reflux disease with esophagitis without hemorrhage 2024    Continuous opioid dependence (HCC) 10/05/2023    Bullous emphysema (HCC) 2023    Takotsubo cardiomyopathy 2023    Underweight 08/10/2023    Mild protein-calorie malnutrition (HCC) 2023    Sciatic leg pain 2023    Hyperlipidemia 2018    Chronic midline low back pain without sciatica 2018    Chronic back pain 2018    Chronic midline thoracic back pain 2018    COPD, very severe (HCC) 2018    Nicotine dependence in remission 2016    Vitamin D deficiency 10/06/2015    BPH (benign prostatic hyperplasia) 2015    Pulmonary nodule seen on imaging study 04/15/2015    Erectile dysfunction of non-organic origin 2013    Encounter for long-term (current) use of other medications 2013    Bipolar affective disorder, depressed, severe (HCC) 2012    Diverticulosis 2012    Generalized anxiety disorder 2012    Ruptured disk 1990      LOS (days): 1  Geometric Mean LOS (GMLOS) (days): 3.7  Days to GMLOS:2.7     OBJECTIVE:    Risk of Unplanned Readmission Score: 12.84         Current admission status: Inpatient       Preferred Pharmacy:   [facility]/pharmacy #[id number] [facility] - [id number]  [facility] [address] [place] [id number]  Phone: [phone] Fax: [phone]    Primary Care Provider: Roverto Kebede DO    Primary Insurance: MEDICARE  Secondary Insurance: [facility]    ASSESSMENT:  Active Health Care Proxies    There are no active Health Care Proxies on file.       Advance Directives  Does patient have a Health Care POA?: No  Was  patient offered paperwork?: Yes (Declined)  Does patient have Advance Directives?: No  Was patient offered paperwork?: Yes (Declined)  Primary Contact: Cande Powell-Spouse         Readmission Root Cause  30 Day Readmission: No    Patient Information  Admitted from:: Home  Mental Status: Alert  During Assessment patient was accompanied by: Not accompanied during assessment  Assessment information provided by:: Patient  Primary Caregiver: Self  Support Systems: Self, Spouse/significant other  County of Residence: [place]  What city do you live in?: [place]  Home entry access options. Select all that apply.: Stairs  Number of steps to enter home.: 2  Type of Current Residence: 2 story home  Upon entering residence, is there a bedroom on the main floor (no further steps)?: No  A bedroom is located on the following floor levels of residence (select all that apply):: 2nd Floor  Upon entering residence, is there a bathroom on the main floor (no further steps)?: Yes (powder room)  Number of steps to 2nd floor from main floor: One Flight (15 steps)  Living Arrangements: Lives w/ Spouse/significant other  Is patient a ?: Yes  Is patient active with [facility] ( [employer])?: Yes ([facility] visits every 6 months)  Is patient service connected?: No    Activities of Daily Living Prior to Admission  Functional Status: Independent  Completes ADLs independently?: Yes  Ambulates independently?: Yes  Does patient use assisted devices?: No  Does patient currently own DME?: Yes  What DME does the patient currently own?: Walker  Does patient have a history of Outpatient Therapy (PT/OT)?: Yes ([facility])  Does the patient have a history of Short-Term Rehab?: Yes (19 years ago)  Does patient have a history of HHC?: No  Does patient currently have HHC?: No         Patient Information Continued  Income Source: Pension/nursing home  Does patient have prescription coverage?: Yes  Does patient receive dialysis treatments?: No  Does patient  have a history of substance abuse?: Yes  Historical substance use preference: Marijuana  Is patient currently in treatment for substance abuse?: N/A - sober (Hasn't used in six months)  Does patient have a history of Mental Health Diagnosis?: Yes (Bipolar)  Is patient receiving treatment for mental health?: No. Patient declined treatment information. (Was medication and therapy for 15 years and declines any further needs.)         Means of Transportation  Means of Transport to [facility]:: Family transport      Social Determinants of Health (SDOH)      Flowsheet Row Most Recent Value   Housing Stability    In the last 12 months, was there a time when you were not able to pay the mortgage or rent on time? N   In the last 12 months, how many places have you lived? 1   In the last 12 months, was there a time when you did not have a steady place to sleep or slept in a shelter (including now)? N   Transportation Needs    In the past 12 months, has lack of transportation kept you from medical appointments or from getting medications? no   In the past 12 months, has lack of transportation kept you from meetings, work, or from getting things needed for daily living? No   Food Insecurity    Within the past 12 months, you worried that your food would run out before you got the money to buy more. Never true   Within the past 12 months, the food you bought just didn't last and you didn't have money to get more. Never true   Utilities    In the past 12 months has the electric, gas, oil, or water company threatened to shut off services in your home? No            DISCHARGE DETAILS:    Discharge planning discussed with:: patient at bedside and Cande via phone  Freedom of Choice: Yes  Comments - Freedom of Choice: pending any needs-Pulmonary Rehab with [facility]  CM contacted family/caregiver?: No- see comments  Were Treatment Team discharge recommendations reviewed with patient/caregiver?: Yes  Did patient/caregiver verbalize understanding of  patient care needs?: Yes  Were patient/caregiver advised of the risks associated with not following Treatment Team discharge recommendations?: Yes    Contacts  Patient Contacts: Cande Flores-spouse  Relationship to Patient:: Family  Contact Method: Phone  Phone Number: [phone]  Reason/Outcome: Continuity of Care, Emergency Contact, Discharge Planning    Requested Home Health Care         Is the patient interested in HHC at discharge?: Yes (If recommended.  He's open to HHC)    Patient lives with spouse in a two story home with a powder room on the first level.  15 steps to ambulate to the second level for bedroom and full bath.  Patient states he has to take a rest when ambulating them.  He is not currently on Home oxygen.  CM explained about a possible chair lift and they can be purchased used as well.  Spouse states he has a hospital grade bed and she has recommended to her spouse to place on the first level in the past.  Patient is retired and has his license but spouse has been doing all the driving lately.  He has been with Pulmonary Rehab as outpatient in the past.  Refuses any treatment for Bipolar disorder.  Received one series of Covid vaccination. Patient is open to any recommendations made by therapy evaluations.  ..CM reviewed d/c planning process including the following: identifying help at home, patient preference for d/c planning needs, Discharge Lounge, [facility] Meds to Bed program, availability of treatment team to discuss questions or concerns patient and/or family may have regarding understanding medications and recognizing signs and symptoms once discharged.  CM also encouraged patient to follow up with all recommended appointments after discharge. Patient advised of importance for patient and family to participate in managing patient’s medical well being.

## 2024-02-27 NOTE — PROGRESS NOTES
Critical Care Attending Note    68 year old man with severe COPD, chronic back pain, depression/BP, BPH, diverticulosis, prior reported Takotsubo's CM, presented for BLVR. He underwent BLVR with single valve in LB6 and single valve in LB 7-10. He was admitted to MICU afterwards.    24hr events - feels stable, noted dyspnea walking hallway, denied sputum production, no chest pain.  Declines nebulizers as he reports previously made him feel worse    VS Reviewed, SpO2 98% 2LNC  Exam  GEN older man in chair, conversant, nontoxic   HEENT ATNC, MMM, no thrush  NECK no accessory muscle use, trachea midline, no crepitus  CV reg, single s1/2, no m/r  Pulm diminished BS diffusely, no wheeze or rales  ABD +BS soft NTND, no rebound  EXT warm, no edema    Laboratory and Diagnostics  Results from last 7 days   Lab Units 02/27/24  0432 02/26/24  1302   WBC Thousand/uL 10.32*  --    HEMOGLOBIN g/dL 12.5  --    HEMATOCRIT % 39.0  --    PLATELETS Thousands/uL 227 231     Results from last 7 days   Lab Units 02/27/24  0432   SODIUM mmol/L 138   POTASSIUM mmol/L 4.4   CHLORIDE mmol/L 102   CO2 mmol/L 30   ANION GAP mmol/L 6   BUN mg/dL 18   CREATININE mg/dL 0.80   CALCIUM mg/dL 9.4   GLUCOSE RANDOM mg/dL 139     Results from last 7 days   Lab Units 02/27/24  0432   MAGNESIUM mg/dL 1.8*   PHOSPHORUS mg/dL 3.3      MICRO  None     RADIOGRAPHS - images personally reviewed  CXR 2/27 - mild left lower lobe atelectasis and volume loss with rising left HD, hyperinflation on right, no PTX    CXR 2/26 - 4hrs - no focal infiltrates, no effusions, no PTX, hyperinflation noted     PFTs 10/2023 - Ratio 32%, FVC 50%, FEV1 21%, no BD response, %, %, DLCO 33%     TTE 1/2024 - EF 61%, normal RV size/function     Assessment   Acute hypoxic respiratory insufficiency  Severe COPD post BLVR LLL  Depression/anxiety  Chronic back pain  HTN     Plan  Remain in MICU for further monitoring post BLVR, plan for 4 night stay given contralateral  destruction score  PTX cart to patient bedside  Serial CXRs per protocol  Hold further nebulizer therapies, willing to start Breo and Incruse trial  Continue chronic prednisone and azithromycin, repeat EKG Qtc 413ms  Cough suppression regimen  Wean O2 for SpO2 >90%  PRN percocet for chronic pain  Holding losartan for now  ADAT  OOB-chair   SCDs/LMWH    Flynn Castillo, DO

## 2024-02-27 NOTE — PHYSICAL THERAPY NOTE
Physical Therapy Evaluation     Patient's Name: Samuel Flores Jr.    Admitting Diagnosis  COPD, very severe ([facility]) [J44.9]    Problem List  Patient Active Problem List   Diagnosis    Chronic midline low back pain without sciatica    Chronic back pain    Chronic midline thoracic back pain    COPD, very severe ([facility])    Hyperlipidemia    Bipolar affective disorder, depressed, severe (HCC)    BPH (benign prostatic hyperplasia)    Generalized anxiety disorder    Diverticulosis    Encounter for long-term (current) use of other medications    Erectile dysfunction of non-organic origin    Pulmonary nodule seen on imaging study    Nicotine dependence in remission    Vitamin D deficiency    Mild protein-calorie malnutrition ([facility])    Ruptured disk    Sciatic leg pain    Underweight    Continuous opioid dependence ([facility])    Bullous emphysema ([facility])    Takotsubo cardiomyopathy    Gastroesophageal reflux disease with esophagitis without hemorrhage       Past Medical History  Past Medical History:   Diagnosis Date    Abnormal chest x-ray with multiple lung nodules     last assessed: April 25, 2012    Anxiety     Arthritis     Bipolar 2 disorder, major depressive episode ([facility])     Chronic tension headaches     last assessed: April 25, 2012    COPD (chronic obstructive pulmonary disease) ([facility])     Depression 08/17/1980    Emphysema of lung ([facility]) 1/01/2017    Noticed from my first ct scan    GERD (gastroesophageal reflux disease)     Not sure of first occurance. Haven’t experienced any recent bouts.    Hypertension     Insomnia     Rheumatoid arthritis ([facility]) 10/01/1975    Scoliosis     Substance abuse ([facility]) 1/01/1970    Use of marajuana. Some experiments with cocain, crank and lsd.       Past Surgical History  Past Surgical History:   Procedure Laterality Date    CHOLECYSTECTOMY OPEN  01/2021    COLONOSCOPY      1-2016 [facility]    TONSILLECTOMY          02/27/24 1130   PT Last Visit   PT Visit Date 02/27/24   Note Type   Note type  Evaluation   Pain Assessment   Pain Assessment Tool 0-10   Pain Score 8   Pain Location/Orientation Location: Back   Restrictions/Precautions   Other Precautions Pain;Fall Risk;Multiple lines;Bed Alarm;Chair Alarm;Telemetry;O2   Home Living   Type of Home House   Home Layout Two level;Stairs to enter with rails  (2 SOLO)   Prior Function   Level of [place] Independent with functional mobility   Lives With Spouse   Receives Help From Family   Cognition   Orientation Level Oriented X4   Subjective   Subjective Pt willing and agreeable to PT session.   RLE Assessment   RLE Assessment WFL   LLE Assessment   LLE Assessment WFL   Coordination   Movements are Fluid and Coordinated 0   Coordination and Movement Description slow and guarded with fatigue and weakness   Bed Mobility   Supine to Sit Unable to assess   Sit to Supine Unable to assess   Additional Comments Pt found resting in chair, left resting in chair, call bell in reach   Transfers   Sit to Stand 5  Supervision   Stand to Sit 5  Supervision   Ambulation/Elevation   Gait pattern Short stride;Excessively slow;Shuffling;Decreased foot clearance   Gait Assistance 5  Supervision   Additional items Assist x 1   Assistive Device None   Distance 200   Balance   Static Sitting Fair +   Dynamic Sitting Fair -   Static Standing Fair -   Ambulatory Fair -   Endurance Deficit   Endurance Deficit Yes   Endurance Deficit Description limited by fatigue, pain, weakness, SOB   Activity Tolerance   Activity Tolerance Patient limited by fatigue;Patient limited by pain   Medical Staff Made Aware OT for D/C plan   Nurse Made Aware yes, nsg gave clearance to work with pt   Assessment   Prognosis Good   Problem List Decreased strength;Decreased range of motion;Decreased endurance;Impaired balance;Decreased mobility;Decreased coordination;Decreased cognition;Decreased safety awareness;Pain;Impaired judgement   Assessment Pt is 68 y.o. male seen for PT evaluation s/p admit to [facility] on 2/26/2024 w/ COPD with BLVR. PT consulted to assess pt's functional mobility and d/c needs. Order placed for PT eval and tx, w/ up w/ A order. Comorbidities affecting pt's physical performance at time of assessment include:  has a past medical history of Abnormal chest x-ray with multiple lung nodules, Anxiety, Arthritis, Bipolar 2 disorder, major depressive episode ([facility]), Chronic tension headaches, COPD (chronic obstructive pulmonary disease) ([facility]), Depression, Emphysema of lung (HCC), GERD (gastroesophageal reflux disease), Hypertension, Insomnia, Rheumatoid arthritis ([facility]), Scoliosis, and Substance abuse ([facility]). PTA, pt was ambulates community distances and elevations. Personal factors affecting pt at time of IE include: stairs to enter home, limited home support, inability to perform IADLs, and inability to perform ADLs. Please find objective findings from PT assessment regarding body systems outlined above with impairments and limitations including impaired balance, decreased endurance, decreased activity tolerance, and SOB upon exertion. Pt required increased time for transfers and mobility although able to complete all mobility with S or better level of A. Offered no concerns related to mobility post D/C. The following objective measures performed on IE also reveal limitations: The patient's AM-PAC Basic Mobility Inpatient Short Form Raw Score is 23, Standardized Score is 50.88. A standardized score greater than 42.9 suggests the patient may benefit from discharge to home. Please also refer to the recommendation of the Physical Therapist for safe discharge planning. Pt's clinical presentation is currently unstable/unpredictable seen in pt's presentation of critical care monitoring. Pt to benefit from continued mobility with staff to maintain level of functional independent mobility and consistency. From PT/mobility standpoint, recommendation at time of d/c would be no rehabilitation needs  pending progress in order to facilitate return to PLOF.   Barriers to Discharge Decreased caregiver support;Inaccessible home environment   Goals   Patient Goals To go home   Plan   Treatment/Interventions OT;Spoke to case management;Spoke to nursing;Gait training;Bed mobility;Patient/family training;Endurance training;Functional transfer training;LE strengthening/ROM   PT Frequency 3-5x/wk   Discharge Recommendation   Rehab Resource Intensity Level, PT No post-acute rehabilitation needs   AM-PAC Basic Mobility Inpatient   Turning in Flat Bed Without Bedrails 4   Lying on Back to Sitting on Edge of Flat Bed Without Bedrails 4   Moving Bed to Chair 4   Standing Up From Chair Using Arms 4   Walk in Room 4   Climb 3-5 Stairs With Railing 3   Basic Mobility Inpatient Raw Score 23   Basic Mobility Standardized Score 50.88   Highest Level Of Mobility   JH-HLM Goal 7: Walk 25 feet or more   JH-HLM Achieved 7: Walk 25 feet or more         Danii Lopez, PT

## 2024-02-27 NOTE — PROGRESS NOTES
[facility]  Progress Note: Critical Care  Name: Samuel Flores Jr. 68 y.o. male I MRN: [id number]  Unit/Bed#: MICU 03 I Date of Admission: 2/26/2024   Date of Service: 2/27/2024 I Hospital Day: 1    Assessment/Plan     Severe COPD/emphysema s/p BLVR  Hypertension  GERD  Anxiety disorder  BPH     PLAN:  Neuro:  Diagnoses: History of anxiety disorder  -Not on any home medications for this  -Continue to monitor     Analgesia  -Has history of chronic low back pain  -On PRN percocet at home for this  -Currently on tylenol 650mg Q6H PRN for mild pain  -Percocet 5-325 mg 0.5 tablet Q6H PRN     CV:     Diagnoses: Hypertension  -On losartan 12.5 mg daily, will hold for now  -Can be restarted tomorrow if hypertensive     Pulm:     Diagnoses: Severe COPD s/p BLVR  SpO2 Trend: 96-99%,   Imaging: CXR reviewed, Plan to continue with 3 days serial CXRs  Plan: Continuous pulse oximetry. Incentive spirometry. Pulmonary toilet. Maintain O2 sat >90%.   -Underwent BLVR at this hospitalization for left lower lobe lung volume reduction  - On tessalon perles 200 mg TID  - On tussionex suspension 5 mL Q12H PRN  Supplemental O2:  On nasal cannula. Wean as tolerated  -At baseline, patient is on 5mg prednisone daily for exacerbation prevention, dyspnea  -Tobacco free since September 2023     GI:  Diagnoses: GERD  GI prophylaxis: On pantoprazole 40 mg twice daily  -Started on bowel regimen     :  Diagnoses: BPH.   Plan:   -On finasteride and tamsulosin at home  -Can consider restarting tomorrow     F/E/N:     - F: Not on any IVF at this time. Fluid resuscitation prn.  - E: Monitor and replete electrolytes for Mg >2, Phos >3, K >4.  - N: On regular house diet     Endo:     Diagnoses: No active diagnosis                Heme:  Diagnosis: No active diagnosis  VTE prophylaxis: On DVT prophylaxis with lovenox. Sequential compression devices and/or foot pumps.     ID:  Diagnoses: No active diagnosis      MSK/Skin:  Plan: Frequent turning and repositioning. Pressure injury prevention. Monitor for skin breakdown. PT/OT when appropriate. Encourage OOB and ambulation when appropriate. Local wound care prn.    Disposition: Critical care    ICU Core Measures     A: Assess, Prevent, and Manage Pain Has pain been assessed? Yes  Need for changes to pain regimen? No   B: Both SAT/SAT  N/A   C: Choice of Sedation RASS Goal: N/A patient not on sedation  Need for changes to sedation or analgesia regimen? No   D: Delirium CAM-ICU: Negative   E: Early Mobility  Plan for early mobility? Yes   F: Family Engagement Plan for family engagement today? Yes       Antibiotic Review: Outpatient medications      Prophylaxis:  VTE VTE covered by:  enoxaparin, Subcutaneous       Stress Ulcer  covered bypantoprazole (PROTONIX) 40 mg tablet [[id number]] (Long-Term Med), pantoprazole (PROTONIX) 40 mg tablet [[id number]] (Long-Term Med), pantoprazole (PROTONIX) EC tablet 40 mg [[id number]]        Significant 24hr Events     24hr events: Patient remained on nasal cannula intermittently through the night.      Subjective   Patient seen by bedside, reporting of intermittent left side chest pain worse with depe inspiration. He states that his cough is well controlled with current medication regimen. States that he has not had a bowel movement since Sunday.     Review of Systems   Constitutional:  Negative for chills and fever.   Respiratory:  Positive for cough.    Cardiovascular:  Negative for palpitations.   Gastrointestinal:  Negative for abdominal pain, nausea and vomiting.   Neurological:  Negative for headaches.        Objective                            Vitals I/O      Most Recent Min/Max in 24hrs   Temp 98.2 °F (36.8 °C) Temp  Min: 98 °F (36.7 °C)  Max: 98.2 °F (36.8 °C)   Pulse 70 Pulse  Min: 65  Max: 112   Resp (!) 28 Resp  Min: 17  Max: 43   /62 BP  Min: 90/60  Max: 168/69   O2 Sat 97 % SpO2  Min: 90 %  Max: 99 %      Intake/Output  Summary (Last 24 hours) at 2/27/2024 0723  Last data filed at 2/27/2024 0432  Gross per 24 hour   Intake 1500 ml   Output 775 ml   Net 725 ml       Diet Regular; Regular House    Invasive Monitoring   Arterial Line  [place] BP    No data recorded   MAP    No data recorded           Physical Exam   Physical Exam  Vitals reviewed.   Skin:     General: Skin is warm.      Capillary Refill: Capillary refill takes less than 2 seconds.   HENT:      Head: Normocephalic.   Cardiovascular:      Rate and Rhythm: Normal rate and regular rhythm.      Pulses: Normal pulses.   Abdominal: General: Bowel sounds are normal.      Palpations: Abdomen is soft.   Constitutional:       General: He is awake.   Pulmonary:      Effort: Pulmonary effort is normal.      Comments: Mild wheezing on let side, clear breath sounds on right side  Neurological:      Mental Status: He is alert and oriented to person, place and time.      Comments: Patient able to move extremities spontaneously.    Genitourinary/Anorectal:  Christian present.          Diagnostic Studies      EKG: Reviewed  Imaging: Reviewed I have personally reviewed pertinent reports.       Medications:  Scheduled PRN   azithromycin, 250 mg, Daily  benzonatate, 100 mg, TID  chlorhexidine, 15 mL, Q12H RUFINA  enoxaparin, 40 mg, Daily  pantoprazole, 40 mg, BID AC  predniSONE, 5 mg, Daily      acetaminophen, 650 mg, Q6H PRN  albuterol, 2 puff, Q4H PRN  Hydrocod David-Chlorphe David ER, 5 mL, Q12H PRN  oxyCODONE, 2.5 mg, Q6H PRN  oxyCODONE-acetaminophen, 1 tablet, Q6H PRN       Continuous          Labs:    CBC    Recent Labs     02/26/24  1302 02/27/24  0432   WBC  --  10.32*   HGB  --  12.5   HCT  --  39.0    227     BMP    Recent Labs     02/27/24  0432   SODIUM 138   K 4.4      CO2 30   AGAP 6   BUN 18   CREATININE 0.80   CALCIUM 9.4       Coags    No recent results     Additional Electrolytes  Recent Labs     02/27/24  0432   MG 1.8*   PHOS 3.3          Blood Gas    No recent  results  No recent results LFTs  No recent results    Infectious  No recent results  Glucose  Recent Labs     02/27/24  0432   GLUC 139               Rivas Hernandez MD

## 2024-02-28 ENCOUNTER — APPOINTMENT (INPATIENT)
Dept: RADIOLOGY | Facility: HOSPITAL | Age: 69
DRG: 164 | End: 2024-02-28
Payer: MEDICARE

## 2024-02-28 PROCEDURE — 71045 X-RAY EXAM CHEST 1 VIEW: CPT

## 2024-02-28 PROCEDURE — 99233 SBSQ HOSP IP/OBS HIGH 50: CPT | Performed by: INTERNAL MEDICINE

## 2024-02-28 PROCEDURE — NC001 PR NO CHARGE: Performed by: INTERNAL MEDICINE

## 2024-02-28 RX ORDER — OXYCODONE HYDROCHLORIDE AND ACETAMINOPHEN 5; 325 MG/1; MG/1
1 TABLET ORAL EVERY 6 HOURS PRN
Status: DISCONTINUED | OUTPATIENT
Start: 2024-02-28 | End: 2024-02-28

## 2024-02-28 RX ORDER — MAGNESIUM SULFATE HEPTAHYDRATE 40 MG/ML
2 INJECTION, SOLUTION INTRAVENOUS ONCE
Status: DISCONTINUED | OUTPATIENT
Start: 2024-02-28 | End: 2024-02-28

## 2024-02-28 RX ORDER — FINASTERIDE 5 MG/1
5 TABLET, FILM COATED ORAL DAILY
Status: DISCONTINUED | OUTPATIENT
Start: 2024-02-28 | End: 2024-03-02 | Stop reason: HOSPADM

## 2024-02-28 RX ORDER — LOSARTAN POTASSIUM 25 MG/1
12.5 TABLET ORAL DAILY
Status: DISCONTINUED | OUTPATIENT
Start: 2024-02-28 | End: 2024-03-02 | Stop reason: HOSPADM

## 2024-02-28 RX ORDER — TAMSULOSIN HYDROCHLORIDE 0.4 MG/1
0.4 CAPSULE ORAL
Status: DISCONTINUED | OUTPATIENT
Start: 2024-02-28 | End: 2024-03-02 | Stop reason: HOSPADM

## 2024-02-28 RX ORDER — ASPIRIN 81 MG/1
81 TABLET, CHEWABLE ORAL DAILY
Status: DISCONTINUED | OUTPATIENT
Start: 2024-02-28 | End: 2024-03-02 | Stop reason: HOSPADM

## 2024-02-28 RX ORDER — OXYCODONE HYDROCHLORIDE AND ACETAMINOPHEN 5; 325 MG/1; MG/1
2 TABLET ORAL EVERY 6 HOURS PRN
Status: DISCONTINUED | OUTPATIENT
Start: 2024-02-28 | End: 2024-03-02 | Stop reason: HOSPADM

## 2024-02-28 RX ORDER — AZITHROMYCIN 250 MG/1
250 TABLET, FILM COATED ORAL 3 TIMES WEEKLY
Status: DISCONTINUED | OUTPATIENT
Start: 2024-02-28 | End: 2024-03-02 | Stop reason: HOSPADM

## 2024-02-28 RX ORDER — KETOROLAC TROMETHAMINE 30 MG/ML
15 INJECTION, SOLUTION INTRAMUSCULAR; INTRAVENOUS ONCE
Status: DISCONTINUED | OUTPATIENT
Start: 2024-02-28 | End: 2024-02-28

## 2024-02-28 RX ORDER — MAGNESIUM SULFATE HEPTAHYDRATE 40 MG/ML
2 INJECTION, SOLUTION INTRAVENOUS ONCE
Status: COMPLETED | OUTPATIENT
Start: 2024-02-28 | End: 2024-02-28

## 2024-02-28 RX ADMIN — BENZONATATE 100 MG: 100 CAPSULE ORAL at 21:06

## 2024-02-28 RX ADMIN — BENZONATATE 100 MG: 100 CAPSULE ORAL at 09:13

## 2024-02-28 RX ADMIN — OXYCODONE HYDROCHLORIDE AND ACETAMINOPHEN 2 TABLET: 5; 325 TABLET ORAL at 21:06

## 2024-02-28 RX ADMIN — AZITHROMYCIN DIHYDRATE 250 MG: 250 TABLET, FILM COATED ORAL at 09:24

## 2024-02-28 RX ADMIN — Medication 2.5 MG: at 03:02

## 2024-02-28 RX ADMIN — ENOXAPARIN SODIUM 40 MG: 40 INJECTION SUBCUTANEOUS at 09:12

## 2024-02-28 RX ADMIN — OXYCODONE HYDROCHLORIDE AND ACETAMINOPHEN 1 TABLET: 5; 325 TABLET ORAL at 09:24

## 2024-02-28 RX ADMIN — PREDNISONE 5 MG: 5 TABLET ORAL at 09:13

## 2024-02-28 RX ADMIN — FLUTICASONE FUROATE AND VILANTEROL TRIFENATATE 1 PUFF: 200; 25 POWDER RESPIRATORY (INHALATION) at 12:16

## 2024-02-28 RX ADMIN — LIDOCAINE 5% 1 PATCH: 700 PATCH TOPICAL at 09:12

## 2024-02-28 RX ADMIN — OXYCODONE HYDROCHLORIDE AND ACETAMINOPHEN 2 TABLET: 5; 325 TABLET ORAL at 14:50

## 2024-02-28 RX ADMIN — PANTOPRAZOLE SODIUM 40 MG: 40 TABLET, DELAYED RELEASE ORAL at 09:28

## 2024-02-28 RX ADMIN — CHLORHEXIDINE GLUCONATE 15 ML: 1.2 SOLUTION ORAL at 21:06

## 2024-02-28 RX ADMIN — PANTOPRAZOLE SODIUM 40 MG: 40 TABLET, DELAYED RELEASE ORAL at 16:59

## 2024-02-28 RX ADMIN — OXYCODONE HYDROCHLORIDE AND ACETAMINOPHEN 1 TABLET: 5; 325 TABLET ORAL at 03:02

## 2024-02-28 RX ADMIN — TAMSULOSIN HYDROCHLORIDE 0.4 MG: 0.4 CAPSULE ORAL at 16:59

## 2024-02-28 RX ADMIN — Medication 2.5 MG: at 09:24

## 2024-02-28 RX ADMIN — AZITHROMYCIN MONOHYDRATE 250 MG: 250 TABLET ORAL at 12:17

## 2024-02-28 RX ADMIN — SENNOSIDES, DOCUSATE SODIUM 1 TABLET: 8.6; 5 TABLET ORAL at 17:01

## 2024-02-28 RX ADMIN — CHLORHEXIDINE GLUCONATE 15 ML: 1.2 SOLUTION ORAL at 09:24

## 2024-02-28 RX ADMIN — ASPIRIN 81 MG CHEWABLE TABLET 81 MG: 81 TABLET CHEWABLE at 12:17

## 2024-02-28 RX ADMIN — POLYETHYLENE GLYCOL 3350 17 G: 17 POWDER, FOR SOLUTION ORAL at 09:13

## 2024-02-28 RX ADMIN — MAGNESIUM SULFATE HEPTAHYDRATE 2 G: 40 INJECTION, SOLUTION INTRAVENOUS at 09:28

## 2024-02-28 RX ADMIN — FINASTERIDE 5 MG: 5 TABLET, FILM COATED ORAL at 12:59

## 2024-02-28 RX ADMIN — BENZONATATE 100 MG: 100 CAPSULE ORAL at 16:59

## 2024-02-28 RX ADMIN — LOSARTAN POTASSIUM 12.5 MG: 25 TABLET, FILM COATED ORAL at 14:52

## 2024-02-28 RX ADMIN — UMECLIDINIUM 1 PUFF: 62.5 AEROSOL, POWDER ORAL at 12:16

## 2024-02-28 NOTE — PROGRESS NOTES
[facility]  Progress Note: Critical Care  Name: Samuel Flores Jr. 68 y.o. male I MRN: [id number]  Unit/Bed#: MICU 03 I Date of Admission: 2/26/2024   Date of Service: 2/28/2024 I Hospital Day: 2    Assessment/Plan     Severe COPD/emphysema s/p BLVR  Hypertension  GERD  Anxiety disorder  BPH     PLAN:  Neuro:  Diagnoses: History of anxiety disorder  -Not on any home medications for this  -Continue to monitor     Analgesia  -Has history of chronic low back pain  -On PRN percocet at home for this  -Currently on tylenol 650mg Q6H PRN for mild pain  -Percocet 5-325 mg 0.5 tablet Q6H PRN     CV:     Diagnoses: Hypertension  -On losartan 12.5 mg daily, will hold for now  -Can be restarted tomorrow if hypertensive     Pulm:     Diagnoses: Severe COPD s/p BLVR  SpO2 Trend: 96-99%,   Imaging: CXR reviewed, Plan to continue with 3 days serial CXRs  Plan: Continuous pulse oximetry. Incentive spirometry. Pulmonary toilet. Maintain O2 sat >90%.   -Underwent BLVR at this hospitalization for left lower lobe lung volume reduction  - On tessalon perles 200 mg TID  - On tussionex suspension 5 mL Q12H PRN  Supplemental O2:  On nasal cannula. Wean as tolerated  -At baseline, patient is on 5mg prednisone daily for exacerbation prevention, dyspnea  -Tobacco free since September 2023  -Unable to tolerate inhaler use     GI:  Diagnoses: GERD  GI prophylaxis: On pantoprazole 40 mg twice daily  -Started on bowel regimen     :  Diagnoses: BPH.   Plan:   -On finasteride and tamsulosin at home  -Can consider restarting tomorrow     F/E/N:     - F: Not on any IVF at this time. Fluid resuscitation prn.  - E: Monitor and replete electrolytes for Mg >2, Phos >3, K >4.  - N: On regular house diet     Endo:     Diagnoses: No active diagnosis                Heme:  Diagnosis: No active diagnosis  VTE prophylaxis: On DVT prophylaxis with lovenox. Sequential compression devices and/or foot pumps.      ID:  Diagnoses: No active diagnosis     MSK/Skin:  Plan: Frequent turning and repositioning. Pressure injury prevention. Monitor for skin breakdown. PT/OT when appropriate. Encourage OOB and ambulation when appropriate. Local wound care prn.    Disposition: Stepdown Level 1    ICU Core Measures     A: Assess, Prevent, and Manage Pain Has pain been assessed? Yes  Need for changes to pain regimen? Yes   B: Both SAT/SAT  N/A   C: Choice of Sedation RASS Goal: N/A patient not on sedation  Need for changes to sedation or analgesia regimen? No   D: Delirium CAM-ICU: Negative   E: Early Mobility  Plan for early mobility? Yes   F: Family Engagement Plan for family engagement today? Yes       Antibiotic Review: Patient on appropriate coverage based on culture data.       Prophylaxis:  VTE VTE covered by:  enoxaparin, Subcutaneous, 40 mg at 02/27/24 0820       Stress Ulcer  covered bypantoprazole (PROTONIX) 40 mg tablet [[id number]] (Long-Term Med), pantoprazole (PROTONIX) 40 mg tablet [[id number]] (Long-Term Med), pantoprazole (PROTONIX) EC tablet 40 mg [[id number]]        Significant 24hr Events     24hr events: Patient went on 1 L of nasal cannula oxygen overnight.  No other significant medical events reported.     Subjective   Patient sitting up at bedside, reporting that he continues to have low back pain.  For short while in the night, he reports that he was feeling a little short of breath but that improved earlier this morning.      Review of Systems   Constitutional:  Negative for chills and fever.   Respiratory:  Negative for chest tightness.    Cardiovascular:  Negative for chest pain.   Gastrointestinal:  Negative for abdominal pain, nausea and vomiting.   Neurological:  Negative for dizziness and headaches.        Objective                            Vitals I/O      Most Recent Min/Max in 24hrs   Temp 98.2 °F (36.8 °C) Temp  Min: 97.9 °F (36.6 °C)  Max: 98.4 °F (36.9 °C)   Pulse 86 Pulse  Min: 64  Max: 93   Resp  22 Resp  Min: 16  Max: 36   /95 BP  Min: 125/76  Max: 160/95   O2 Sat 96 % SpO2  Min: 90 %  Max: 98 %      Intake/Output Summary (Last 24 hours) at 2/28/2024 0706  Last data filed at 2/28/2024 0301  Gross per 24 hour   Intake 600 ml   Output 950 ml   Net -350 ml       Diet Regular; Regular House    Invasive Monitoring   Arterial Line  Loyal BP    No data recorded   MAP    No data recorded           Physical Exam   Physical Exam  Vitals reviewed.   Skin:     General: Skin is warm.      Capillary Refill: Capillary refill takes less than 2 seconds.   HENT:      Head: Normocephalic.   Cardiovascular:      Rate and Rhythm: Normal rate and regular rhythm.      Pulses: Normal pulses.   Abdominal: General: Bowel sounds are normal.      Palpations: Abdomen is soft.   Constitutional:       Appearance: He is underweight.      Comments: Patient seen lying in bed   Pulmonary:      Effort: Pulmonary effort is normal.      Comments: Mild wheezing noted right-sided, audible wheezing on the left.  No conversational dyspnea noted.  Neurological:      Mental Status: He is alert and oriented to person, place and time.      Comments: Is able to move all extremities spontaneously, able to follow simple commands.            Diagnostic Studies      EKG: Reviewed  Imaging: Reviewed I have personally reviewed pertinent reports.       Medications:  Scheduled PRN   azithromycin, 250 mg, Daily  benzonatate, 100 mg, TID  chlorhexidine, 15 mL, Q12H RUFINA  enoxaparin, 40 mg, Daily  fluticasone-vilanterol, 1 puff, Daily  lidocaine, 1 patch, Daily  magnesium sulfate, 2 g, Once  pantoprazole, 40 mg, BID AC  polyethylene glycol, 17 g, Daily  predniSONE, 5 mg, Daily  senna-docusate sodium, 1 tablet, HS  umeclidinium, 1 puff, Daily      acetaminophen, 650 mg, Q6H PRN  albuterol, 2 puff, Q4H PRN  Hydrocod David-Chlorphe David ER, 5 mL, Q12H PRN  oxyCODONE, 2.5 mg, Q6H PRN  oxyCODONE-acetaminophen, 1 tablet, Q6H PRN       Continuous           Labs:    CBC    Recent Labs     02/26/24  1302 02/27/24  0432   WBC  --  10.32*   HGB  --  12.5   HCT  --  39.0    227     BMP    Recent Labs     02/27/24  0432   SODIUM 138   K 4.4      CO2 30   AGAP 6   BUN 18   CREATININE 0.80   CALCIUM 9.4       Coags    No recent results     Additional Electrolytes  Recent Labs     02/27/24  0432   MG 1.8*   PHOS 3.3       Magnesium repleted   Blood Gas    No recent results  No recent results LFTs  No recent results    Infectious  No recent results  Glucose  Recent Labs     02/27/24  0432   GLUC 139               Rivas Hernandez MD

## 2024-02-28 NOTE — DISCHARGE INSTR - AVS FIRST PAGE
Patient Instructions After Endobronchial Valve Procedure      It is important you follow these general instructions as you continue your recovery at home in the days and weeks after the [place] Valve Procedure. If you have any questions or concerns regarding these instructions, please discuss them with your treating doctor and his/her care team.      Your Care Team   Lorraine Olivarez D.O. and Flynn Castillo D.O. - Treating Physicians   Yanely Chang - Interventional Pulmonary Coordinator    [phone]     Follow-up Visit  You should follow up with your physician as outlined in discharge instructions.  Please have your chest xray done prior to the visit.     Medications   You must continue all your prescribed inhalers, nebulizer treatments and oxygen therapy (if applicable) as per your discharge instructions.      Pneumothorax Risk   You are at risk of experiencing pneumothorax for the next several weeks to months, therefore it is important that you follow these instructions to reduce your risk and be aware of the symptoms.      Symptoms of pneumothorax include sudden onset of chest pain and shortness of breath.  Do not ignore these symptoms. If the symptoms are severe, report to the closest emergency department.       Wear your “Pneumothorax Risk” bracelet to alert medical personnel.      Please put your Implant/ Patient Information Card in your wallet in case of an emergency. It is also a good idea to keep a photo of your card in your phone.      If you develop symptoms of shortness of breath that is worse than usual, increase in wheezing, cough or sputum production and the symptoms are not severe, please call your physician for instructions.      Activity Restrictions   In the weeks following discharge from the hospital, only engage in light physical activity, as you are able and comfortable. You can participate in activities such as self-care, light household chores, socializing with friends and family,  and light exercise like walking on level ground.       Avoid raising your arms above shoulder level and bending over at the waist until your follow-up visit.       Limit lifting to no more than 5-10 pounds.       Do not resume pulmonary rehabilitation until it is cleared by your treating physician.      Travel Restrictions   Do not drive for the first few days after discharge. This is the amount of time usually required for you to regain the necessary mobility and strength to safely navigate and steer a car.      Avoid traveling out of the state and particularly by air for at least 1 week after discharge. If you have any problems or complications, this will help ensure you are within close proximity and will have easy access to the treating hospital for evaluation and treatment.      Returning to Work   Do not return to work full-time until you feel physically and mentally strong enough to resume our normal work activities.       Depending on your job and required activity level, you may not be able to return to your full set of work responsibilities for 1-2 weeks after the procedure.      If you have any questions or concerns or need special assistance regarding this instruction, please check with your doctor.      Other Symptoms/ Health Changes   Fatigue/Lack of Energy   You have just undergone a significant procedure so you may feel tired and have a lack of energy in the days and weeks following the procedure as your body heals and you regain your strength. You should take it slow and easy and listen to your own body during this time so you can appropriately pace yourself and avoid over doing it.       Decrease in Appetite   You may experience a temporary decrease in appetite as the medicines from your procedure. Your appetite should return to normal within 1-2 weeks following the procedure. If your appetite is poor try to eat small, nutrient-rich meals.  Be sure you are drinking a lot of fluids throughout the  day (6-8 cups per day unless instructed otherwise by your doctor.)       Pain/ Discomfort   You may experience some pain and discomfort in your nose, throat and airways.      Other Changes   You may experience increased shortness of breath accompanied by increased cough and/ or wheezing, increased sputum production, changes in sputum color or fever.  These symptoms may suggest you are having COPD exacerbation or an episode of pneumonia. Additionally, you may cough up blood streaks. It is also possible to cough up one of the valves.       It is important you call your family doctor and/or the treating doctor (as appropriate) if you experience any of these symptoms or other changes in your health that seem out of the ordinary or worrisome to you.       Finally, it is important that you understand that it could take weeks to months to get the full benefit from the procedure. Do not get discouraged if your progress is slow. Focus on the “small wins” every day.       We will plan to perform a chest CT and pulmonary function test in 6 weeks to measure your response to the procedure. These will be scheduled at your 1 week follow up visit.

## 2024-02-28 NOTE — PROGRESS NOTES
Critical Care Attending Note     68 year old man with severe COPD, chronic back pain, depression/BP, BPH, diverticulosis, prior reported Takotsubo's CM, presented for BLVR. He underwent BLVR with single valve in LB6 and single valve in LB 7-10. He was admitted to MICU afterwards.     24hr events - noted continued back pain, no pleurisy, scant sputum production, no hemoptysis.  Tolerating Breo and Incruse    VS AF, HR 60-90's, MAPS 's, SpO2 96% 1LNC  Exam  GEN older man in chair eating lunch  HEENT ATNC, MMM, no thrush  NECK no accessory muscle use, trachea midline  CV reg, single s1/2, no m/r  Pulm diminished BS, no wheeze or rales, no rhonchi  ABD +BS soft NTND  EXT no edema, warm    Laboratory and Diagnostics  Results from last 7 days   Lab Units 02/27/24  0432 02/26/24  1302   WBC Thousand/uL 10.32*  --    HEMOGLOBIN g/dL 12.5  --    HEMATOCRIT % 39.0  --    PLATELETS Thousands/uL 227 231     Results from last 7 days   Lab Units 02/27/24  0432   SODIUM mmol/L 138   POTASSIUM mmol/L 4.4   CHLORIDE mmol/L 102   CO2 mmol/L 30   ANION GAP mmol/L 6   BUN mg/dL 18   CREATININE mg/dL 0.80   CALCIUM mg/dL 9.4   GLUCOSE RANDOM mg/dL 139     Results from last 7 days   Lab Units 02/27/24  0432   MAGNESIUM mg/dL 1.8*   PHOSPHORUS mg/dL 3.3      MICRO  None     RADIOGRAPHS - images personally reviewed  CXR 2/28 - progressive left lower lobe atelectasis, elevation left HD, persistent hyperinflation on right, no PTX    CXR 2/27 - mild left lower lobe atelectasis and volume loss with rising left HD, hyperinflation on right, no PTX     CXR 2/26 - 4hrs - no focal infiltrates, no effusions, no PTX, hyperinflation noted     PFTs 10/2023 - Ratio 32%, FVC 50%, FEV1 21%, no BD response, %, %, DLCO 33%     TTE 1/2024 - EF 61%, normal RV size/function     Assessment   Acute hypoxic respiratory insufficiency  Severe COPD post BLVR LLL  Depression/anxiety  Chronic back pain  HTN  BPH     Plan  Remain in MICU for further  monitoring post BLVR, plan for 4 night stay given contralateral destruction score  PTX cart to patient bedside  Serial CXRs per protocol  Hold further nebulizer therapies, Cont Breo and Incruse trial  Continue chronic prednisone and azithromycin, repeat EKG Qtc 413ms  Cough suppression regimen  Wean O2 for SpO2 >90%  PRN percocet for chronic pain - increase to 2 tablets  Resume Cozaar 12.5mg now  Resume BPH regimen  ADAT  OOB-chair   SCDs/LMWH     Flynn Castillo, DO

## 2024-02-29 ENCOUNTER — APPOINTMENT (INPATIENT)
Dept: RADIOLOGY | Facility: HOSPITAL | Age: 69
DRG: 164 | End: 2024-02-29
Payer: MEDICARE

## 2024-02-29 LAB
ANION GAP SERPL CALCULATED.3IONS-SCNC: 7 MMOL/L
BASOPHILS # BLD AUTO: 0.04 THOUSANDS/ÂΜL (ref 0–0.1)
BASOPHILS NFR BLD AUTO: 0 % (ref 0–1)
BUN SERPL-MCNC: 18 MG/DL (ref 5–25)
CALCIUM SERPL-MCNC: 9.1 MG/DL (ref 8.4–10.2)
CHLORIDE SERPL-SCNC: 99 MMOL/L (ref 96–108)
CO2 SERPL-SCNC: 31 MMOL/L (ref 21–32)
CREAT SERPL-MCNC: 0.84 MG/DL (ref 0.6–1.3)
EOSINOPHIL # BLD AUTO: 0.28 THOUSAND/ÂΜL (ref 0–0.61)
EOSINOPHIL NFR BLD AUTO: 3 % (ref 0–6)
ERYTHROCYTE [DISTWIDTH] IN BLOOD BY AUTOMATED COUNT: 12.6 % (ref 11.6–15.1)
GFR SERPL CREATININE-BSD FRML MDRD: 88 ML/MIN/1.73SQ M
GLUCOSE SERPL-MCNC: 108 MG/DL (ref 65–140)
HCT VFR BLD AUTO: 44.1 % (ref 36.5–49.3)
HGB BLD-MCNC: 14.2 G/DL (ref 12–17)
IMM GRANULOCYTES # BLD AUTO: 0.06 THOUSAND/UL (ref 0–0.2)
IMM GRANULOCYTES NFR BLD AUTO: 1 % (ref 0–2)
LYMPHOCYTES # BLD AUTO: 1.71 THOUSANDS/ÂΜL (ref 0.6–4.47)
LYMPHOCYTES NFR BLD AUTO: 17 % (ref 14–44)
MAGNESIUM SERPL-MCNC: 2.1 MG/DL (ref 1.9–2.7)
MCH RBC QN AUTO: 29.9 PG (ref 26.8–34.3)
MCHC RBC AUTO-ENTMCNC: 32.2 G/DL (ref 31.4–37.4)
MCV RBC AUTO: 93 FL (ref 82–98)
MONOCYTES # BLD AUTO: 0.9 THOUSAND/ÂΜL (ref 0.17–1.22)
MONOCYTES NFR BLD AUTO: 9 % (ref 4–12)
NEUTROPHILS # BLD AUTO: 7.31 THOUSANDS/ÂΜL (ref 1.85–7.62)
NEUTS SEG NFR BLD AUTO: 70 % (ref 43–75)
NRBC BLD AUTO-RTO: 0 /100 WBCS
PLATELET # BLD AUTO: 235 THOUSANDS/UL (ref 149–390)
PMV BLD AUTO: 9.5 FL (ref 8.9–12.7)
POTASSIUM SERPL-SCNC: 4.1 MMOL/L (ref 3.5–5.3)
RBC # BLD AUTO: 4.75 MILLION/UL (ref 3.88–5.62)
SODIUM SERPL-SCNC: 137 MMOL/L (ref 135–147)
WBC # BLD AUTO: 10.3 THOUSAND/UL (ref 4.31–10.16)

## 2024-02-29 PROCEDURE — 80048 BASIC METABOLIC PNL TOTAL CA: CPT | Performed by: STUDENT IN AN ORGANIZED HEALTH CARE EDUCATION/TRAINING PROGRAM

## 2024-02-29 PROCEDURE — 83735 ASSAY OF MAGNESIUM: CPT | Performed by: STUDENT IN AN ORGANIZED HEALTH CARE EDUCATION/TRAINING PROGRAM

## 2024-02-29 PROCEDURE — 71045 X-RAY EXAM CHEST 1 VIEW: CPT

## 2024-02-29 PROCEDURE — 85025 COMPLETE CBC W/AUTO DIFF WBC: CPT | Performed by: STUDENT IN AN ORGANIZED HEALTH CARE EDUCATION/TRAINING PROGRAM

## 2024-02-29 PROCEDURE — 99233 SBSQ HOSP IP/OBS HIGH 50: CPT | Performed by: INTERNAL MEDICINE

## 2024-02-29 PROCEDURE — NC001 PR NO CHARGE: Performed by: INTERNAL MEDICINE

## 2024-02-29 RX ORDER — BISACODYL 10 MG
10 SUPPOSITORY, RECTAL RECTAL DAILY PRN
Status: DISCONTINUED | OUTPATIENT
Start: 2024-02-29 | End: 2024-03-02 | Stop reason: HOSPADM

## 2024-02-29 RX ORDER — BISACODYL 5 MG/1
5 TABLET, DELAYED RELEASE ORAL DAILY
Status: DISCONTINUED | OUTPATIENT
Start: 2024-02-29 | End: 2024-03-02 | Stop reason: HOSPADM

## 2024-02-29 RX ADMIN — TAMSULOSIN HYDROCHLORIDE 0.4 MG: 0.4 CAPSULE ORAL at 15:32

## 2024-02-29 RX ADMIN — PANTOPRAZOLE SODIUM 40 MG: 40 TABLET, DELAYED RELEASE ORAL at 15:30

## 2024-02-29 RX ADMIN — SENNOSIDES, DOCUSATE SODIUM 1 TABLET: 8.6; 5 TABLET ORAL at 17:57

## 2024-02-29 RX ADMIN — PREDNISONE 5 MG: 5 TABLET ORAL at 08:30

## 2024-02-29 RX ADMIN — FINASTERIDE 5 MG: 5 TABLET, FILM COATED ORAL at 08:30

## 2024-02-29 RX ADMIN — UMECLIDINIUM 1 PUFF: 62.5 AEROSOL, POWDER ORAL at 08:35

## 2024-02-29 RX ADMIN — POLYETHYLENE GLYCOL 3350 17 G: 17 POWDER, FOR SOLUTION ORAL at 08:29

## 2024-02-29 RX ADMIN — OXYCODONE HYDROCHLORIDE AND ACETAMINOPHEN 2 TABLET: 5; 325 TABLET ORAL at 03:36

## 2024-02-29 RX ADMIN — PANTOPRAZOLE SODIUM 40 MG: 40 TABLET, DELAYED RELEASE ORAL at 07:30

## 2024-02-29 RX ADMIN — OXYCODONE HYDROCHLORIDE AND ACETAMINOPHEN 2 TABLET: 5; 325 TABLET ORAL at 21:25

## 2024-02-29 RX ADMIN — OXYCODONE HYDROCHLORIDE AND ACETAMINOPHEN 2 TABLET: 5; 325 TABLET ORAL at 09:34

## 2024-02-29 RX ADMIN — ASPIRIN 81 MG CHEWABLE TABLET 81 MG: 81 TABLET CHEWABLE at 08:30

## 2024-02-29 RX ADMIN — LIDOCAINE 5% 1 PATCH: 700 PATCH TOPICAL at 08:29

## 2024-02-29 RX ADMIN — BISACODYL 5 MG: 5 TABLET, COATED ORAL at 14:03

## 2024-02-29 RX ADMIN — ENOXAPARIN SODIUM 40 MG: 40 INJECTION SUBCUTANEOUS at 08:29

## 2024-02-29 RX ADMIN — BENZONATATE 100 MG: 100 CAPSULE ORAL at 15:30

## 2024-02-29 RX ADMIN — CHLORHEXIDINE GLUCONATE 15 ML: 1.2 SOLUTION ORAL at 08:29

## 2024-02-29 RX ADMIN — BENZONATATE 100 MG: 100 CAPSULE ORAL at 08:30

## 2024-02-29 RX ADMIN — FLUTICASONE FUROATE AND VILANTEROL TRIFENATATE 1 PUFF: 200; 25 POWDER RESPIRATORY (INHALATION) at 08:35

## 2024-02-29 RX ADMIN — CHLORHEXIDINE GLUCONATE 15 ML: 1.2 SOLUTION ORAL at 21:25

## 2024-02-29 RX ADMIN — OXYCODONE HYDROCHLORIDE AND ACETAMINOPHEN 2 TABLET: 5; 325 TABLET ORAL at 15:30

## 2024-02-29 RX ADMIN — BENZONATATE 100 MG: 100 CAPSULE ORAL at 21:25

## 2024-02-29 NOTE — PROGRESS NOTES
[facility]  Progress Note: Critical Care  Name: Samuel Flores Jr. 68 y.o. male I MRN: [id number]  Unit/Bed#: MICU 03 I Date of Admission: 2/26/2024   Date of Service: 2/29/2024 I Hospital Day: 3    Assessment/Plan     Severe COPD/emphysema s/p BLVR  Hypertension  GERD  Anxiety disorder  BPH     PLAN:  Neuro:  Diagnoses: History of anxiety disorder  -Not on any home medications for this  -Continue to monitor     Analgesia  -Has history of chronic low back pain  -On PRN percocet at home for this  -Currently on tylenol 650mg Q6H PRN for mild pain  -Percocet 5-325 mg 2 tablet Q6H PRN     CV:     Diagnoses: Hypertension  -On losartan 12.5 mg daily     Pulm:     Diagnoses: Severe COPD s/p BLVR  SpO2 Trend: 96-99%,   Imaging: CXR reviewed, Plan to continue with 3 days serial CXRs  Plan: Continuous pulse oximetry. Incentive spirometry. Pulmonary toilet. Maintain O2 sat >90%.   -Underwent BLVR at this hospitalization for left lower lobe lung volume reduction  - On tessalon perles 200 mg TID  - On tussionex suspension 5 mL Q12H PRN  Supplemental O2:  On nasal cannula. Wean as tolerated  -At baseline, patient is on 5mg prednisone daily for exacerbation prevention, dyspnea  -Tobacco free since September 2023  -Started on inhalers     GI:  Diagnoses: GERD  GI prophylaxis: On pantoprazole 40 mg twice daily  -Started on bowel regimen     :  Diagnoses: BPH.   Plan:   -On finasteride and tamsulosin at home  -Both medications restarted     F/E/N:     - F: Not on any IVF at this time. Fluid resuscitation prn.  - E: Monitor and replete electrolytes for Mg >2, Phos >3, K >4.  - N: On regular house diet     Endo:     Diagnoses: No active diagnosis                Heme:  Diagnosis: No active diagnosis  VTE prophylaxis: On DVT prophylaxis with lovenox. Sequential compression devices and/or foot pumps.     ID:  Diagnoses: No active diagnosis     MSK/Skin:  Plan: Frequent turning and repositioning.  Pressure injury prevention. Monitor for skin breakdown. PT/OT when appropriate. Encourage OOB and ambulation when appropriate. Local wound care prn.    Disposition: Stepdown Level 1    ICU Core Measures     A: Assess, Prevent, and Manage Pain Has pain been assessed? Yes  Need for changes to pain regimen? No   B: Both SAT/SAT  N/A   C: Choice of Sedation RASS Goal: N/A patient not on sedation  Need for changes to sedation or analgesia regimen? No   D: Delirium CAM-ICU: Negative   E: Early Mobility  Plan for early mobility? Yes   F: Family Engagement Plan for family engagement today? Yes       Antibiotic Review: Patient on appropriate coverage based on culture data.       Prophylaxis:  VTE VTE covered by:  enoxaparin, Subcutaneous, 40 mg at 02/28/24 0912       Stress Ulcer  covered bypantoprazole (PROTONIX) 40 mg tablet [[id number]] (Long-Term Med), pantoprazole (PROTONIX) 40 mg tablet [[id number]] (Long-Term Med), pantoprazole (PROTONIX) EC tablet 40 mg [[id number]]        Significant 24hr Events     24hr events: No significant events overnight.     Subjective   Reports feeling better this morning, has been using inhalers past day and states that it has improved his breathing. Reporting that his back pain is wellcontrolled.     Review of Systems   Constitutional:  Negative for chills and fever.   Respiratory:  Negative for cough.    Cardiovascular:  Negative for chest pain and palpitations.   Gastrointestinal:  Negative for abdominal pain, nausea and vomiting.        Objective                            Vitals I/O      Most Recent Min/Max in 24hrs   Temp 98.6 °F (37 °C) Temp  Min: 98 °F (36.7 °C)  Max: 98.6 °F (37 °C)   Pulse 70 Pulse  Min: 70  Max: 98   Resp 12 Resp  Min: 11  Max: 37   /61 BP  Min: 96/68  Max: 133/86   O2 Sat 97 % SpO2  Min: 87 %  Max: 98 %      Intake/Output Summary (Last 24 hours) at 2/29/2024 8978  Last data filed at 2/29/2024 0416  Gross per 24 hour   Intake 260 ml   Output 1300 ml   Net  -1040 ml       Diet Regular; Regular House    Invasive Monitoring           Physical Exam   Physical Exam  Vitals reviewed.   Skin:     General: Skin is warm.      Capillary Refill: Capillary refill takes less than 2 seconds.   HENT:      Head: Normocephalic.   Cardiovascular:      Rate and Rhythm: Normal rate and regular rhythm.      Pulses: Normal pulses.   Abdominal: General: Bowel sounds are normal.      Palpations: Abdomen is soft.   Pulmonary:      Effort: Pulmonary effort is normal.      Breath sounds: Normal breath sounds.   Neurological:      Mental Status: He is oriented to person, place and time.            Diagnostic Studies      EKG: Reviewed  Imaging: Reviewed I have personally reviewed pertinent reports.       Medications:  Scheduled PRN   aspirin, 81 mg, Daily  azithromycin, 250 mg, Once per day on Monday Wednesday Friday  benzonatate, 100 mg, TID  chlorhexidine, 15 mL, Q12H RUFINA  enoxaparin, 40 mg, Daily  finasteride, 5 mg, Daily  fluticasone-vilanterol, 1 puff, Daily  lidocaine, 1 patch, Daily  losartan, 12.5 mg, Daily  pantoprazole, 40 mg, BID AC  polyethylene glycol, 17 g, Daily  predniSONE, 5 mg, Daily  senna-docusate sodium, 1 tablet, HS  tamsulosin, 0.4 mg, Daily With Dinner  umeclidinium, 1 puff, Daily      acetaminophen, 650 mg, Q6H PRN  albuterol, 2 puff, Q4H PRN  Hydrocod David-Chlorphe David ER, 5 mL, Q12H PRN  oxyCODONE, 2.5 mg, Q6H PRN  oxyCODONE-acetaminophen, 2 tablet, Q6H PRN       Continuous          Labs:    CBC    Recent Labs     02/29/24  0352   WBC 10.30*   HGB 14.2   HCT 44.1        BMP    Recent Labs     02/29/24  0416   SODIUM 137   K 4.1   CL 99   CO2 31   AGAP 7   BUN 18   CREATININE 0.84   CALCIUM 9.1       Coags    No recent results     Additional Electrolytes  Recent Labs     02/29/24  0416   MG 2.1          Blood Gas    No recent results  No recent results LFTs  No recent results    Infectious  No recent results  Glucose  Recent Labs     02/29/24  0416   GLUC 108                Rivas Hernandez MD

## 2024-02-29 NOTE — PROGRESS NOTES
Critical Care Attending Note     68 year old man with severe COPD, chronic back pain, depression/BP, BPH, diverticulosis, prior reported Takotsubo's CM, presented for BLVR. He underwent BLVR with single valve in LB6 and single valve in LB 7-10. He was admitted to MICU afterwards.     24hr events - increased percocet due to back pain, now improved.  Walking hallways without difficulty, tolerating inhalers well.      VS AF, HR 70-80's, MAPS 70-80's, SpO2 97% RA  Exam  GEN Found in chair, good spirits conversant, nontoxic  HEENT MMM, no thrush, no oral lesions  Neck No accessory muscle use, JVP not elevated  CV reg, single s1/2, no m/r  Pulm diminished BS b/l, no wheeze or rales  ABD +BS soft NTND, no rebound  EXT warm, brisk cap refill, no edema      Laboratory and Diagnostics  Results from last 7 days   Lab Units 02/29/24  0352 02/27/24  0432 02/26/24  1302   WBC Thousand/uL 10.30* 10.32*  --    HEMOGLOBIN g/dL 14.2 12.5  --    HEMATOCRIT % 44.1 39.0  --    PLATELETS Thousands/uL 235 227 231   NEUTROS PCT % 70  --   --    MONOS PCT % 9  --   --    EOS PCT % 3  --   --      Results from last 7 days   Lab Units 02/29/24  0416 02/27/24  0432   SODIUM mmol/L 137 138   POTASSIUM mmol/L 4.1 4.4   CHLORIDE mmol/L 99 102   CO2 mmol/L 31 30   ANION GAP mmol/L 7 6   BUN mg/dL 18 18   CREATININE mg/dL 0.84 0.80   CALCIUM mg/dL 9.1 9.4   GLUCOSE RANDOM mg/dL 108 139     Results from last 7 days   Lab Units 02/29/24  0416 02/27/24  0432   MAGNESIUM mg/dL 2.1 1.8*   PHOSPHORUS mg/dL  --  3.3      MICRO  None     RADIOGRAPHS - images personally reviewed  CXR 2/29 -     CXR 2/28 - progressive left lower lobe atelectasis, elevation left HD, persistent hyperinflation on right, no PTX     CXR 2/27 - mild left lower lobe atelectasis and volume loss with rising left HD, hyperinflation on right, no PTX     CXR 2/26 - 4hrs - no focal infiltrates, no effusions, no PTX, hyperinflation noted     PFTs 10/2023 - Ratio 32%, FVC 50%, FEV1 21%, no  BD response, %, %, DLCO 33%     TTE 1/2024 - EF 61%, normal RV size/function     Assessment   Acute hypoxic respiratory insufficiency  Severe COPD post BLVR LLL  Depression/anxiety  Chronic back pain  HTN  BPH     Plan  Remain in MICU for further monitoring post BLVR, plan for 5 night stay given contralateral destruction score  PTX cart to patient bedside  Serial CXRs per protocol  Hold further nebulizer therapies, Cont Breo and Incruse trial - Trelegy 200mcg at d/c  Continue chronic prednisone and azithromycin, QTc has been stable   Cough suppression regimen  Wean O2 for SpO2 >90%  PRN percocet for chronic pain - increased to 2 tablets  Cont  Cozaar 12.5mg now  Cont BPH regimen  ADAT  OOB-chair   SCDs/LMWH    Flynn Castillo,

## 2024-03-01 ENCOUNTER — APPOINTMENT (INPATIENT)
Dept: RADIOLOGY | Facility: HOSPITAL | Age: 69
DRG: 164 | End: 2024-03-01
Payer: MEDICARE

## 2024-03-01 LAB
ANION GAP SERPL CALCULATED.3IONS-SCNC: 8 MMOL/L
BASOPHILS # BLD AUTO: 0.03 THOUSANDS/ÂΜL (ref 0–0.1)
BASOPHILS NFR BLD AUTO: 0 % (ref 0–1)
BUN SERPL-MCNC: 19 MG/DL (ref 5–25)
CALCIUM SERPL-MCNC: 8.9 MG/DL (ref 8.4–10.2)
CHLORIDE SERPL-SCNC: 102 MMOL/L (ref 96–108)
CO2 SERPL-SCNC: 28 MMOL/L (ref 21–32)
CREAT SERPL-MCNC: 0.67 MG/DL (ref 0.6–1.3)
EOSINOPHIL # BLD AUTO: 0.4 THOUSAND/ÂΜL (ref 0–0.61)
EOSINOPHIL NFR BLD AUTO: 5 % (ref 0–6)
ERYTHROCYTE [DISTWIDTH] IN BLOOD BY AUTOMATED COUNT: 12.8 % (ref 11.6–15.1)
GFR SERPL CREATININE-BSD FRML MDRD: 97 ML/MIN/1.73SQ M
GLUCOSE SERPL-MCNC: 94 MG/DL (ref 65–140)
HCT VFR BLD AUTO: 41.6 % (ref 36.5–49.3)
HGB BLD-MCNC: 13.2 G/DL (ref 12–17)
IMM GRANULOCYTES # BLD AUTO: 0.03 THOUSAND/UL (ref 0–0.2)
IMM GRANULOCYTES NFR BLD AUTO: 0 % (ref 0–2)
LYMPHOCYTES # BLD AUTO: 1.94 THOUSANDS/ÂΜL (ref 0.6–4.47)
LYMPHOCYTES NFR BLD AUTO: 26 % (ref 14–44)
MCH RBC QN AUTO: 29.7 PG (ref 26.8–34.3)
MCHC RBC AUTO-ENTMCNC: 31.7 G/DL (ref 31.4–37.4)
MCV RBC AUTO: 94 FL (ref 82–98)
MONOCYTES # BLD AUTO: 0.7 THOUSAND/ÂΜL (ref 0.17–1.22)
MONOCYTES NFR BLD AUTO: 9 % (ref 4–12)
NEUTROPHILS # BLD AUTO: 4.38 THOUSANDS/ÂΜL (ref 1.85–7.62)
NEUTS SEG NFR BLD AUTO: 60 % (ref 43–75)
NRBC BLD AUTO-RTO: 0 /100 WBCS
PLATELET # BLD AUTO: 230 THOUSANDS/UL (ref 149–390)
PMV BLD AUTO: 10.3 FL (ref 8.9–12.7)
POTASSIUM SERPL-SCNC: 3.7 MMOL/L (ref 3.5–5.3)
RBC # BLD AUTO: 4.45 MILLION/UL (ref 3.88–5.62)
SODIUM SERPL-SCNC: 138 MMOL/L (ref 135–147)
WBC # BLD AUTO: 7.48 THOUSAND/UL (ref 4.31–10.16)

## 2024-03-01 PROCEDURE — 85025 COMPLETE CBC W/AUTO DIFF WBC: CPT | Performed by: STUDENT IN AN ORGANIZED HEALTH CARE EDUCATION/TRAINING PROGRAM

## 2024-03-01 PROCEDURE — 99233 SBSQ HOSP IP/OBS HIGH 50: CPT | Performed by: INTERNAL MEDICINE

## 2024-03-01 PROCEDURE — NC001 PR NO CHARGE: Performed by: INTERNAL MEDICINE

## 2024-03-01 PROCEDURE — 80048 BASIC METABOLIC PNL TOTAL CA: CPT | Performed by: STUDENT IN AN ORGANIZED HEALTH CARE EDUCATION/TRAINING PROGRAM

## 2024-03-01 PROCEDURE — 71045 X-RAY EXAM CHEST 1 VIEW: CPT

## 2024-03-01 RX ORDER — ALBUTEROL SULFATE 90 UG/1
2 AEROSOL, METERED RESPIRATORY (INHALATION) EVERY 4 HOURS PRN
Qty: 18 G | Refills: 0 | Status: CANCELLED | OUTPATIENT
Start: 2024-03-01

## 2024-03-01 RX ORDER — ACETAMINOPHEN 325 MG/1
650 TABLET ORAL EVERY 6 HOURS PRN
Refills: 0 | Status: CANCELLED | OUTPATIENT
Start: 2024-03-01 | End: 2024-03-04

## 2024-03-01 RX ORDER — FLUTICASONE FUROATE AND VILANTEROL 100; 25 UG/1; UG/1
1 POWDER RESPIRATORY (INHALATION) DAILY
Qty: 60 BLISTER | Refills: 0 | Status: CANCELLED | OUTPATIENT
Start: 2024-03-01

## 2024-03-01 RX ORDER — POTASSIUM CHLORIDE 20 MEQ/1
40 TABLET, EXTENDED RELEASE ORAL ONCE
Status: COMPLETED | OUTPATIENT
Start: 2024-03-01 | End: 2024-03-01

## 2024-03-01 RX ADMIN — BISACODYL 5 MG: 5 TABLET, COATED ORAL at 08:30

## 2024-03-01 RX ADMIN — BENZONATATE 100 MG: 100 CAPSULE ORAL at 17:09

## 2024-03-01 RX ADMIN — CHLORHEXIDINE GLUCONATE 15 ML: 1.2 SOLUTION ORAL at 08:29

## 2024-03-01 RX ADMIN — PREDNISONE 5 MG: 5 TABLET ORAL at 08:30

## 2024-03-01 RX ADMIN — LOSARTAN POTASSIUM 12.5 MG: 25 TABLET, FILM COATED ORAL at 08:30

## 2024-03-01 RX ADMIN — FLUTICASONE FUROATE AND VILANTEROL TRIFENATATE 1 PUFF: 200; 25 POWDER RESPIRATORY (INHALATION) at 08:36

## 2024-03-01 RX ADMIN — UMECLIDINIUM 1 PUFF: 62.5 AEROSOL, POWDER ORAL at 08:37

## 2024-03-01 RX ADMIN — AZITHROMYCIN MONOHYDRATE 250 MG: 250 TABLET ORAL at 08:30

## 2024-03-01 RX ADMIN — ENOXAPARIN SODIUM 40 MG: 40 INJECTION SUBCUTANEOUS at 08:29

## 2024-03-01 RX ADMIN — LIDOCAINE 5% 1 PATCH: 700 PATCH TOPICAL at 08:31

## 2024-03-01 RX ADMIN — OXYCODONE HYDROCHLORIDE AND ACETAMINOPHEN 2 TABLET: 5; 325 TABLET ORAL at 15:39

## 2024-03-01 RX ADMIN — PANTOPRAZOLE SODIUM 40 MG: 40 TABLET, DELAYED RELEASE ORAL at 17:09

## 2024-03-01 RX ADMIN — PANTOPRAZOLE SODIUM 40 MG: 40 TABLET, DELAYED RELEASE ORAL at 06:02

## 2024-03-01 RX ADMIN — POLYETHYLENE GLYCOL 3350 17 G: 17 POWDER, FOR SOLUTION ORAL at 08:30

## 2024-03-01 RX ADMIN — ASPIRIN 81 MG CHEWABLE TABLET 81 MG: 81 TABLET CHEWABLE at 08:30

## 2024-03-01 RX ADMIN — POTASSIUM CHLORIDE 40 MEQ: 1500 TABLET, EXTENDED RELEASE ORAL at 08:30

## 2024-03-01 RX ADMIN — BENZONATATE 100 MG: 100 CAPSULE ORAL at 08:30

## 2024-03-01 RX ADMIN — OXYCODONE HYDROCHLORIDE AND ACETAMINOPHEN 2 TABLET: 5; 325 TABLET ORAL at 03:30

## 2024-03-01 RX ADMIN — CHLORHEXIDINE GLUCONATE 15 ML: 1.2 SOLUTION ORAL at 21:37

## 2024-03-01 RX ADMIN — SENNOSIDES, DOCUSATE SODIUM 1 TABLET: 8.6; 5 TABLET ORAL at 17:09

## 2024-03-01 RX ADMIN — TAMSULOSIN HYDROCHLORIDE 0.4 MG: 0.4 CAPSULE ORAL at 17:09

## 2024-03-01 RX ADMIN — BENZONATATE 100 MG: 100 CAPSULE ORAL at 21:37

## 2024-03-01 RX ADMIN — FINASTERIDE 5 MG: 5 TABLET, FILM COATED ORAL at 08:31

## 2024-03-01 RX ADMIN — OXYCODONE HYDROCHLORIDE AND ACETAMINOPHEN 2 TABLET: 5; 325 TABLET ORAL at 09:34

## 2024-03-01 RX ADMIN — OXYCODONE HYDROCHLORIDE AND ACETAMINOPHEN 2 TABLET: 5; 325 TABLET ORAL at 21:37

## 2024-03-01 NOTE — PLAN OF CARE
Problem: Prexisting or High Potential for Compromised Skin Integrity  Goal: Skin integrity is maintained or improved  Description: INTERVENTIONS:  - Identify patients at risk for skin breakdown  - Assess and monitor skin integrity  - Assess and monitor nutrition and hydration status  - Monitor labs   - Assess for incontinence   - Turn and reposition patient  - Assist with mobility/ambulation  - Relieve pressure over bony prominences  - Avoid friction and shearing  - Provide appropriate hygiene as needed including keeping skin clean and dry  - Evaluate need for skin moisturizer/barrier cream  - Collaborate with interdisciplinary team   - Patient/family teaching  - Consider wound care consult   3/1/2024 0051 by Wang Baker  Outcome: Progressing  3/1/2024 0050 by Wang Baker  Outcome: Progressing  3/1/2024 0048 by Wang Baker  Outcome: Progressing     Problem: PAIN - ADULT  Goal: Verbalizes/displays adequate comfort level or baseline comfort level  Description: Interventions:  - Encourage patient to monitor pain and request assistance  - Assess pain using appropriate pain scale  - Administer analgesics based on type and severity of pain and evaluate response  - Implement non-pharmacological measures as appropriate and evaluate response  - Consider cultural and social influences on pain and pain management  - Notify physician/advanced practitioner if interventions unsuccessful or patient reports new pain  3/1/2024 0051 by Wang Baker  Outcome: Progressing  3/1/2024 0050 by Wang Baker  Outcome: Progressing     Problem: INFECTION - ADULT  Goal: Absence or prevention of progression during hospitalization  Description: INTERVENTIONS:  - Assess and monitor for signs and symptoms of infection  - Monitor lab/diagnostic results  - Monitor all insertion sites, i.e. indwelling lines, tubes, and drains  - Monitor endotracheal if appropriate and nasal secretions for changes  in amount and color  - [place] appropriate cooling/warming therapies per order  - Administer medications as ordered  - Instruct and encourage patient and family to use good hand hygiene technique  - Identify and instruct in appropriate isolation precautions for identified infection/condition  3/1/2024 0051 by Wang Baker  Outcome: Progressing  3/1/2024 0050 by Wang Baker  Outcome: Progressing  Goal: Absence of fever/infection during neutropenic period  Description: INTERVENTIONS:  - Monitor WBC    3/1/2024 0051 by Wang Baker  Outcome: Progressing  3/1/2024 0050 by Wang Baker  Outcome: Progressing     Problem: SAFETY ADULT  Goal: Patient will remain free of falls  Description: INTERVENTIONS:  - Educate patient/family on patient safety including physical limitations  - Instruct patient to call for assistance with activity   - Consult OT/PT to assist with strengthening/mobility   - Keep Call bell within reach  - Keep bed low and locked with side rails adjusted as appropriate  - Keep care items and personal belongings within reach  - Initiate and maintain comfort rounds  - Make Fall Risk Sign visible to staff  - Offer Toileting every 2 Hours, in advance of need  - Initiate/Maintain bed alarm  - Apply yellow socks and bracelet for high fall risk patients  - Consider moving patient to room near nurses station  3/1/2024 0051 by Wang Baker  Outcome: Progressing  3/1/2024 0050 by Wang Baker  Outcome: Progressing  Goal: Maintain or return to baseline ADL function  Description: INTERVENTIONS:  -  Assess patient's ability to carry out ADLs; assess patient's baseline for ADL function and identify physical deficits which impact ability to perform ADLs (bathing, care of mouth/teeth, toileting, grooming, dressing, etc.)  - Assess/evaluate cause of self-care deficits   - Assess range of motion  - Assess patient's mobility; develop plan if impaired  - Assess  patient's need for assistive devices and provide as appropriate  - Encourage maximum independence but intervene and supervise when necessary  - Involve family in performance of ADLs  - Assess for home care needs following discharge   - Consider OT consult to assist with ADL evaluation and planning for discharge  - Provide patient education as appropriate  3/1/2024 0051 by Wang Baker  Outcome: Progressing  3/1/2024 0050 by Wang Baker  Outcome: Progressing  Goal: Maintains/Returns to pre admission functional level  Description: INTERVENTIONS:  - Perform AM-PAC 6 Click Basic Mobility/ Daily Activity assessment daily.  - Set and communicate daily mobility goal to care team and patient/family/caregiver.   - Collaborate with rehabilitation services on mobility goals if consulted  - Ambulate patient 3 times a day  - Out of bed to chair 3 times a day   - Out of bed for meals 3 times a day  - Out of bed for toileting  - Record patient progress and toleration of activity level   3/1/2024 0051 by Wang Baker  Outcome: Progressing  3/1/2024 0050 by Wang Baker  Outcome: Progressing     Problem: DISCHARGE PLANNING  Goal: Discharge to home or other facility with appropriate resources  Description: INTERVENTIONS:  - Identify barriers to discharge w/patient and caregiver  - Arrange for needed discharge resources and transportation as appropriate  - Identify discharge learning needs (meds, wound care, etc.)  - Arrange for interpretive services to assist at discharge as needed  - Refer to Case Management Department for coordinating discharge planning if the patient needs post-hospital services based on physician/advanced practitioner order or complex needs related to functional status, cognitive ability, or social support system  3/1/2024 0051 by Wang Baker  Outcome: Progressing  3/1/2024 0050 by Wang Baker  Outcome: Progressing     Problem: Knowledge  Deficit  Goal: Patient/family/caregiver demonstrates understanding of disease process, treatment plan, medications, and discharge instructions  Description: Complete learning assessment and assess knowledge base.  Interventions:  - Provide teaching at level of understanding  - Provide teaching via preferred learning methods  3/1/2024 0051 by Wang Baker  Outcome: Progressing  3/1/2024 0050 by Wang Baker  Outcome: Progressing     Problem: RESPIRATORY - ADULT  Goal: Achieves optimal ventilation and oxygenation  Description: INTERVENTIONS:  - Assess for changes in respiratory status  - Assess for changes in mentation and behavior  - Position to facilitate oxygenation and minimize respiratory effort  - Oxygen administered by appropriate delivery if ordered  - Initiate smoking cessation education as indicated  - Encourage broncho-pulmonary hygiene including cough, deep breathe, Incentive Spirometry  - Assess the need for suctioning and aspirate as needed  - Assess and instruct to report SOB or any respiratory difficulty  - Respiratory Therapy support as indicated  3/1/2024 0051 by Wang Baker  Outcome: Progressing  3/1/2024 0050 by Wang Baker  Outcome: Progressing

## 2024-03-01 NOTE — CASE MANAGEMENT
Case Management Discharge Planning Note    Patient name Samuel Flores Jr.  [id number]  : 1955 Date 3/1/2024       Current Admission Date: 2024  Current Admission Diagnosis:COPD, very severe (HCC)   Patient Active Problem List    Diagnosis Date Noted    Gastroesophageal reflux disease with esophagitis without hemorrhage 2024    Continuous opioid dependence (HCC) 10/05/2023    Bullous emphysema (HCC) 2023    Takotsubo cardiomyopathy 2023    Underweight 08/10/2023    Mild protein-calorie malnutrition (HCC) 2023    Sciatic leg pain 2023    Hyperlipidemia 2018    Chronic midline low back pain without sciatica 2018    Chronic back pain 2018    Chronic midline thoracic back pain 2018    COPD, very severe (HCC) 2018    Nicotine dependence in remission 2016    Vitamin D deficiency 10/06/2015    BPH (benign prostatic hyperplasia) 2015    Pulmonary nodule seen on imaging study 04/15/2015    Erectile dysfunction of non-organic origin 2013    Encounter for long-term (current) use of other medications 2013    Bipolar affective disorder, depressed, severe (HCC) 2012    Diverticulosis 2012    Generalized anxiety disorder 2012    Ruptured disk 1990      LOS (days): 4  Geometric Mean LOS (GMLOS) (days): 3.7  Days to GMLOS:-0.5     OBJECTIVE:  Risk of Unplanned Readmission Score: 13.53         Current admission status: Inpatient   Preferred Pharmacy:   [id number] [address]  [facility] [address] [place] [id number]  Phone: [phone] Fax: [phone]    Primary Care Provider: Roverto Kebede DO    Primary Insurance: MEDICARE  Secondary Insurance: [facility]    DISCHARGE DETAILS:                                                                                                 Additional Comments: 69yo male with severe COPD is s/p [place] Valve LLL,  5 night stay, plan discharge home tomorrow if doing well. No HHC needs.

## 2024-03-01 NOTE — PROGRESS NOTES
[facility]  Progress Note: Critical Care  Name: Samuel Flores Jr. 68 y.o. male I MRN: [id number]  Unit/Bed#: MICU 03 I Date of Admission: 2/26/2024   Date of Service: 3/1/2024 I Hospital Day: 4    Assessment/Plan     Severe COPD/emphysema s/p BLVR  Hypertension  GERD  Anxiety disorder  BPH     PLAN:  Neuro:  Diagnoses: History of anxiety disorder  -Not on any home medications for this  -Continue to monitor     Analgesia  -Has history of chronic low back pain  -On PRN percocet at home for this  -Currently on tylenol 650mg Q6H PRN for mild pain  -Percocet 5-325 mg 2 tablet Q6H PRN     CV:     Diagnoses: Hypertension  -On losartan 12.5 mg daily     Pulm:     Diagnoses: Severe COPD s/p BLVR  SpO2 Trend: 96-99%,   Imaging: CXR reviewed, Plan to continue with 3 days serial CXRs  Plan: Continuous pulse oximetry. Incentive spirometry. Pulmonary toilet. Maintain O2 sat >90%.   -Underwent BLVR at this hospitalization for left lower lobe lung volume reduction  - On tessalon perles 200 mg TID  - On tussionex suspension 5 mL Q12H PRN  Supplemental O2:  On nasal cannula. Wean as tolerated  -At baseline, patient is on 5mg prednisone daily for exacerbation prevention, dyspnea  -Tobacco free since September 2023  -Started on inhalers     GI:  Diagnoses: GERD  GI prophylaxis: On pantoprazole 40 mg twice daily  -Started on bowel regimen     :  Diagnoses: BPH.   Plan:   -On finasteride and tamsulosin at home  -Both medications restarted     F/E/N:     - F: Not on any IVF at this time. Fluid resuscitation prn.  - E: Monitor and replete electrolytes for Mg >2, Phos >3, K >4.  - N: On regular house diet     Endo:     Diagnoses: No active diagnosis                Heme:  Diagnosis: No active diagnosis  VTE prophylaxis: On DVT prophylaxis with lovenox. Sequential compression devices and/or foot pumps.     ID:  Diagnoses: No active diagnosis     MSK/Skin:  Plan: Frequent turning and repositioning.  Pressure injury prevention. Monitor for skin breakdown. PT/OT when appropriate. Encourage OOB and ambulation when appropriate. Local wound care prn.       Disposition: Stepdown Level 1    ICU Core Measures     A: Assess, Prevent, and Manage Pain Has pain been assessed? Yes  Need for changes to pain regimen? No   B: Both SAT/SAT  N/A   C: Choice of Sedation RASS Goal: N/A patient not on sedation  Need for changes to sedation or analgesia regimen? NA   D: Delirium CAM-ICU: Negative   E: Early Mobility  Plan for early mobility? Yes   F: Family Engagement Plan for family engagement today? Yes       Antibiotic Review: Patient on appropriate coverage based on culture data.       Prophylaxis:  VTE VTE covered by:  enoxaparin, Subcutaneous, 40 mg at 02/29/24 0829       Stress Ulcer  covered bypantoprazole (PROTONIX) 40 mg tablet [[id number]] (Long-Term Med), pantoprazole (PROTONIX) 40 mg tablet [[id number]] (Long-Term Med), pantoprazole (PROTONIX) EC tablet 40 mg [[id number]]        Significant 24hr Events     24hr events: Overnight no acute events noted.     Subjective   This morning, patient reports feeling much better and states that he was able to walk around the unit at least 3 times yesterday.  He does not have any chest pain at this time.  States that he had a small bowel movement yesterday and is currently passing flatus.    Review of Systems   Constitutional:  Negative for chills and fever.   Respiratory:  Negative for chest tightness.    Cardiovascular:  Negative for chest pain and palpitations.   Gastrointestinal:  Negative for abdominal pain, nausea and vomiting.   Neurological:  Negative for dizziness and headaches.        Objective                            Vitals I/O      Most Recent Min/Max in 24hrs   Temp 98.9 °F (37.2 °C) Temp  Min: 97.5 °F (36.4 °C)  Max: 98.9 °F (37.2 °C)   Pulse 75 Pulse  Min: 66  Max: 101   Resp (!) 23 Resp  Min: 11  Max: 23   /76 BP  Min: 96/58  Max: 135/80   O2 Sat 94 % SpO2   Min: 90 %  Max: 96 %      Intake/Output Summary (Last 24 hours) at 3/1/2024 0810  Last data filed at 3/1/2024 0401  Gross per 24 hour   Intake --   Output 650 ml   Net -650 ml       Diet Regular; Regular House    Invasive Monitoring   Arterial Line  Kely BP    No data recorded   MAP    No data recorded           Physical Exam   Physical Exam  Vitals reviewed.   Skin:     General: Skin is warm.      Capillary Refill: Capillary refill takes less than 2 seconds.   HENT:      Head: Normocephalic.   Cardiovascular:      Rate and Rhythm: Normal rate and regular rhythm.      Pulses: Normal pulses.   Abdominal: General: Bowel sounds are normal.      Palpations: Abdomen is soft.   Pulmonary:      Effort: Pulmonary effort is normal.      Breath sounds: Normal breath sounds.   Neurological:      Mental Status: He is alert and oriented to person, place and time.      Comments: Patient able to move extremity spontaneously, able to follow all commands at this time.  Patient seen lying in bed.            Diagnostic Studies      EKG: Reviewed  Imaging: Reviewed I have personally reviewed pertinent reports.       Medications:  Scheduled PRN   aspirin, 81 mg, Daily  azithromycin, 250 mg, Once per day on Monday Wednesday Friday  benzonatate, 100 mg, TID  bisacodyl, 5 mg, Daily  chlorhexidine, 15 mL, Q12H RUFINA  enoxaparin, 40 mg, Daily  finasteride, 5 mg, Daily  fluticasone-vilanterol, 1 puff, Daily  lidocaine, 1 patch, Daily  losartan, 12.5 mg, Daily  pantoprazole, 40 mg, BID AC  polyethylene glycol, 17 g, Daily  predniSONE, 5 mg, Daily  senna-docusate sodium, 1 tablet, HS  tamsulosin, 0.4 mg, Daily With Dinner  umeclidinium, 1 puff, Daily      acetaminophen, 650 mg, Q6H PRN  albuterol, 2 puff, Q4H PRN  bisacodyl, 10 mg, Daily PRN  Hydrocod David-Chlorphe David ER, 5 mL, Q12H PRN  oxyCODONE, 2.5 mg, Q6H PRN  oxyCODONE-acetaminophen, 2 tablet, Q6H PRN       Continuous          Labs:    CBC    Recent Labs     02/29/24  0352 03/01/24  2640    WBC 10.30* 7.48   HGB 14.2 13.2   HCT 44.1 41.6    230     BMP    Recent Labs     02/29/24  0416 03/01/24  0449   SODIUM 137 138   K 4.1 3.7   CL 99 102   CO2 31 28   AGAP 7 8   BUN 18 19   CREATININE 0.84 0.67   CALCIUM 9.1 8.9       Coags    No recent results     Additional Electrolytes  Recent Labs     02/29/24  0416   MG 2.1          Blood Gas    No recent results  No recent results LFTs  No recent results    Infectious  No recent results  Glucose  Recent Labs     02/29/24  0416 03/01/24  0449   GLUC 108 94               Rivas Hernandez MD

## 2024-03-01 NOTE — DISCHARGE SUMMARY
"CRITICAL CARE DISCHARGE SUMMARY  Discharge Summary - Samuel Flores Jr. 68 y.o. male MRN: [id number]    [id number]      Admission Date:   Admission Orders (From admission, onward)       Ordered        02/26/24 1221  Inpatient Admission  Once                            Admitting Diagnosis: COPD, very severe (HCC) [J44.9]    HPI: As per HPI by Dr. Olivarez, \"Samuel Flores Jr. is a 68 y.o. male who is here today to finalize plans for bronchoscopic lung volume reduction.  We also made arrangements for this visit so that his adult children can be participants in the conversation so that they also can hear risks and benefits of the procedure.  From pulmonary perspective, patient's overall status is unchanged compared with last visit in January.  He continues struggle with daily symptoms of shortness of breath.  Unfortunately he is not able to take any maintenance inhalers or nebulizer therapy due to feeling as if it worsens his breathing.  He is maintained on prednisone 5 mg daily and azithromycin 3 times weekly.  No recent ER visits or hospitalizations related to COPD.\"    Patient was subsequently admitted to MICU after BLVR and remained in the unit for next 5 days post-procedurally for monitoring. He underwent serial CXR daily and underwent aggressive physical therapy. He was weaned off supplemental nasal cannula oxygen and was on room air towards the end of his hospital stay. He was placed on inhaler regimen which was provided to him at time of discharge. Today, patient is stable for discharge. Patient is strongly urged to follow up with his PCP and pulmonologist.      Physical Exam  Vitals and nursing note reviewed.   Constitutional:       General: He is not in acute distress.     Appearance: Normal appearance. He is not ill-appearing, toxic-appearing or diaphoretic.   HENT:      Head: Normocephalic and atraumatic.      Mouth/Throat:      Mouth: Mucous membranes are moist.      " Pharynx: Oropharynx is clear.   Eyes:      Extraocular Movements: Extraocular movements intact.      Pupils: Pupils are equal, round, and reactive to light.   Cardiovascular:      Rate and Rhythm: Normal rate and regular rhythm.      Heart sounds: Normal heart sounds.   Pulmonary:      Effort: Pulmonary effort is normal. No respiratory distress.      Breath sounds: Normal breath sounds.   Abdominal:      General: Abdomen is flat. There is no distension.      Palpations: Abdomen is soft.      Tenderness: There is no abdominal tenderness.   Musculoskeletal:         General: Normal range of motion.      Cervical back: Normal range of motion and neck supple.   Skin:     General: Skin is warm and dry.   Neurological:      General: No focal deficit present.      Mental Status: He is alert and oriented to person, place, and time.        Procedures Performed: No orders of the defined types were placed in this encounter.      Summary of Hospital Course: Please refer to our daily progress notes    Significant Findings, Care, Treatment and Services Provided: Please refer to our daily progress notes    Complications: None    Discharge Diagnosis: Severe COPD s/p BLVR    Medical Problems       Resolved Problems  Date Reviewed: 2/25/2024   None         Condition at Discharge: stable     Discharge instructions/Information to patient and family:   See after visit summary for information provided to patient and family.      Provisions for Follow-Up Care:  See after visit summary for information related to follow-up care and any pertinent home health orders.      PCP: Roverto Kebede DO    Disposition: Home    Planned Readmission: No    Discharge Medications:  See after visit summary for reconciled discharge medications provided to patient and family.

## 2024-03-01 NOTE — RESTORATIVE TECHNICIAN NOTE
Restorative Technician Note      Patient Name: Samuel DON Mark Yang     Restorative Tech Visit Date: 03/01/24  Note Type: Mobility  Patient Position Upon Consult: Bedside chair  Activity Performed: Ambulated  Assistive Device: Other (Comment) (None)  Education Provided: Yes  Patient Position at End of Consult: Bedside chair; All needs within reach      Betty Plata Restorative Tech

## 2024-03-01 NOTE — PROGRESS NOTES
Critical Care Attending Note     68 year old man with severe COPD, chronic back pain, depression/BP, BPH, diverticulosis, prior reported Takotsubo's CM, presented for BLVR. He underwent BLVR with single valve in LB6 and single valve in LB 7-10. He was admitted to MICU afterwards.     24hr events - feeling overall improved, scant nonpurulent sputum production today, back pain tolerable    VS AF, HR 70-90's, MAPS 80-90's, SpO2 94% RA  Exam  GEN older man in chair conversant nontoxic  HEENT ATNC, MMM, no thrush  NECK no accessory muscle use  CV reg, single s1/2, no m/r  Pulm diminished BS b/l, no wheeze or rales  ABD +BS soft NTND, no rebound  EXT no edema, warm    Laboratory and Diagnostics  Results from last 7 days   Lab Units 03/01/24 0449 02/29/24 0352 02/27/24  0432 02/26/24  1302   WBC Thousand/uL 7.48 10.30* 10.32*  --    HEMOGLOBIN g/dL 13.2 14.2 12.5  --    HEMATOCRIT % 41.6 44.1 39.0  --    PLATELETS Thousands/uL 230 235 227 231   NEUTROS PCT % 60 70  --   --    MONOS PCT % 9 9  --   --    EOS PCT % 5 3  --   --      Results from last 7 days   Lab Units 03/01/24 0449 02/29/24 0416 02/27/24  0432   SODIUM mmol/L 138 137 138   POTASSIUM mmol/L 3.7 4.1 4.4   CHLORIDE mmol/L 102 99 102   CO2 mmol/L 28 31 30   ANION GAP mmol/L 8 7 6   BUN mg/dL 19 18 18   CREATININE mg/dL 0.67 0.84 0.80   CALCIUM mg/dL 8.9 9.1 9.4   GLUCOSE RANDOM mg/dL 94 108 139     Results from last 7 days   Lab Units 02/29/24 0416 02/27/24  0432   MAGNESIUM mg/dL 2.1 1.8*   PHOSPHORUS mg/dL  --  3.3        MICRO  None     RADIOGRAPHS - images personally reviewed  CXR 3/1 - left basilar atelectasis, elevated left HD, no PTX    CXR 2/29 - left basilar atelectasis, elevated left HD, no PTX     CXR 2/28 - progressive left lower lobe atelectasis, elevation left HD, persistent hyperinflation on right, no PTX     CXR 2/27 - mild left lower lobe atelectasis and volume loss with rising left HD, hyperinflation on right, no PTX     CXR 2/26 - 4hrs -  no focal infiltrates, no effusions, no PTX, hyperinflation noted     PFTs 10/2023 - Ratio 32%, FVC 50%, FEV1 21%, no BD response, %, %, DLCO 33%     TTE 1/2024 - EF 61%, normal RV size/function     Assessment   Acute hypoxic respiratory insufficiency  Severe COPD post BLVR LLL  Depression/anxiety  Chronic back pain  HTN  BPH     Plan  Remain in MICU for further monitoring post BLVR, plan for 5 night stay given contralateral destruction score -hopeful for d/c tomorrow am  PTX cart to patient bedside  Serial CXRs per protocol  Hold further nebulizer therapies, Cont Breo and Incruse trial - Trelegy 200mcg at d/c  Continue chronic prednisone and azithromycin, QTc has been stable   Cough suppression regimen  Wean O2 for SpO2 >90% - ambulation trials w/o need for home O2  PRN percocet for chronic pain - increased to 2 tablets  Cont  Cozaar 12.5mg now  Cont BPH regimen  ADAT  OOB-chair   SCDs/LMWH    Flynn Castillo, DO

## 2024-03-02 ENCOUNTER — APPOINTMENT (INPATIENT)
Dept: RADIOLOGY | Facility: HOSPITAL | Age: 69
DRG: 164 | End: 2024-03-02
Payer: MEDICARE

## 2024-03-02 VITALS
HEIGHT: 69 IN | BODY MASS INDEX: 19.27 KG/M2 | WEIGHT: 130.07 LBS | RESPIRATION RATE: 16 BRPM | DIASTOLIC BLOOD PRESSURE: 73 MMHG | HEART RATE: 72 BPM | OXYGEN SATURATION: 96 % | SYSTOLIC BLOOD PRESSURE: 133 MMHG | TEMPERATURE: 97.9 F

## 2024-03-02 PROCEDURE — 71045 X-RAY EXAM CHEST 1 VIEW: CPT

## 2024-03-02 PROCEDURE — NC001 PR NO CHARGE: Performed by: INTERNAL MEDICINE

## 2024-03-02 PROCEDURE — 99238 HOSP IP/OBS DSCHRG MGMT 30/<: CPT | Performed by: INTERNAL MEDICINE

## 2024-03-02 RX ORDER — BENZONATATE 100 MG/1
100 CAPSULE ORAL 3 TIMES DAILY
Qty: 20 CAPSULE | Refills: 0 | Status: SHIPPED | OUTPATIENT
Start: 2024-03-02

## 2024-03-02 RX ORDER — FLUTICASONE FUROATE, UMECLIDINIUM BROMIDE AND VILANTEROL TRIFENATATE 200; 62.5; 25 UG/1; UG/1; UG/1
1 POWDER RESPIRATORY (INHALATION) DAILY
Qty: 60 BLISTER | Refills: 0 | Status: SHIPPED | OUTPATIENT
Start: 2024-03-02 | End: 2024-04-01

## 2024-03-02 RX ADMIN — PANTOPRAZOLE SODIUM 40 MG: 40 TABLET, DELAYED RELEASE ORAL at 06:15

## 2024-03-02 RX ADMIN — UMECLIDINIUM 1 PUFF: 62.5 AEROSOL, POWDER ORAL at 08:37

## 2024-03-02 RX ADMIN — OXYCODONE HYDROCHLORIDE AND ACETAMINOPHEN 2 TABLET: 5; 325 TABLET ORAL at 09:40

## 2024-03-02 RX ADMIN — BISACODYL 5 MG: 5 TABLET, COATED ORAL at 08:20

## 2024-03-02 RX ADMIN — BENZONATATE 100 MG: 100 CAPSULE ORAL at 08:19

## 2024-03-02 RX ADMIN — LIDOCAINE 5% 1 PATCH: 700 PATCH TOPICAL at 08:21

## 2024-03-02 RX ADMIN — POLYETHYLENE GLYCOL 3350 17 G: 17 POWDER, FOR SOLUTION ORAL at 08:19

## 2024-03-02 RX ADMIN — CHLORHEXIDINE GLUCONATE 15 ML: 1.2 SOLUTION ORAL at 08:19

## 2024-03-02 RX ADMIN — PREDNISONE 5 MG: 5 TABLET ORAL at 08:20

## 2024-03-02 RX ADMIN — OXYCODONE HYDROCHLORIDE AND ACETAMINOPHEN 2 TABLET: 5; 325 TABLET ORAL at 03:34

## 2024-03-02 RX ADMIN — LOSARTAN POTASSIUM 12.5 MG: 25 TABLET, FILM COATED ORAL at 08:19

## 2024-03-02 RX ADMIN — FLUTICASONE FUROATE AND VILANTEROL TRIFENATATE 1 PUFF: 200; 25 POWDER RESPIRATORY (INHALATION) at 08:37

## 2024-03-02 RX ADMIN — ASPIRIN 81 MG CHEWABLE TABLET 81 MG: 81 TABLET CHEWABLE at 08:19

## 2024-03-02 RX ADMIN — ENOXAPARIN SODIUM 40 MG: 40 INJECTION SUBCUTANEOUS at 08:19

## 2024-03-02 RX ADMIN — FINASTERIDE 5 MG: 5 TABLET, FILM COATED ORAL at 08:21

## 2024-03-02 NOTE — PROGRESS NOTES
[facility]  Progress Note: Critical Care  Name: Samuel Flores Jr. 68 y.o. male I MRN: [id number]  Unit/Bed#: MICU 03 I Date of Admission: 2/26/2024   Date of Service: 3/2/2024 I Hospital Day: 5    Problem list:  Severe COPD/emphysema s/p BLVR  Hypertension  GERD  Anxiety disorder  BPH    Assessment/Plan   Neuro:  Diagnoses: History of anxiety disorder  -Not on any home medications for this  -Continue to monitor     Analgesia  -Has history of chronic low back pain  -On PRN percocet at home for this  -Currently on tylenol 650mg Q6H PRN for mild pain  -Percocet 5-325 mg 2 tablet Q6H PRN     CV:   Diagnoses: Hypertension  -On losartan 12.5 mg daily     Pulm:     Diagnoses: Severe COPD s/p BLVR  SpO2 Trend: 94-97%,   Imaging: AM CXR looks okay- same as yesterday  Plan: Continuous pulse oximetry. Incentive spirometry. Pulmonary toilet. Maintain O2 sat >90%.   -Underwent BLVR at this hospitalization for left lower lobe lung volume reduction  - On tessalon perles 200 mg TID  - On tussionex suspension 5 mL Q12H PRN  Supplemental O2:  On nasal cannula. Wean as tolerated  -At baseline, patient is on 5mg prednisone daily for exacerbation prevention, dyspnea  -Patient on azithromycin at baseline  -Tobacco free since September 2023  -Started on inhalers     GI:  Diagnoses: GERD  GI prophylaxis: On pantoprazole 40 mg twice daily  -Started on bowel regimen   - No BM recorded, f/u nursing     :  Diagnoses: BPH.   Plan:   -On finasteride and tamsulosin at home  -Both medications restarted     F/E/N:     - F: None  - E: Monitor and replete electrolytes for Mg >2, Phos >3, K >4.   -No labs today  - N: On regular house diet     Endo:     Diagnoses: No active diagnosis                Heme:  Diagnosis: No active diagnosis  VTE prophylaxis: On DVT prophylaxis with lovenox. Sequential compression devices and/or foot pumps.     ID:  Diagnoses: No active diagnosis     MSK/Skin:  Plan: Frequent  turning and repositioning. Pressure injury prevention. Monitor for skin breakdown. PT/OT when appropriate. Encourage OOB and ambulation when appropriate. Local wound care prn.      Disposition: Home    ICU Core Measures     A: Assess, Prevent, and Manage Pain Has pain been assessed? Yes  Need for changes to pain regimen? No   B: Both SAT/SAT  N/A   C: Choice of Sedation RASS Goal: N/A patient not on sedation  Need for changes to sedation or analgesia regimen? No   D: Delirium CAM-ICU: Negative   E: Early Mobility  Plan for early mobility? Yes   F: Family Engagement Plan for family engagement today? Yes       Antibiotic Review: Chronic azithromycin      Prophylaxis:  VTE VTE covered by:  enoxaparin, Subcutaneous, 40 mg at 03/01/24 0829       Stress Ulcer  covered bypantoprazole (PROTONIX) 40 mg tablet [[id number]] (Long-Term Med), pantoprazole (PROTONIX) 40 mg tablet [[id number]] (Long-Term Med), pantoprazole (PROTONIX) EC tablet 40 mg [[id number]]        Significant 24hr Events     24hr events: No acute events overnight, repeat CXR this AM.     Subjective     Review of Systems     Objective                            Vitals I/O      Most Recent Min/Max in 24hrs   Temp 97.9 °F (36.6 °C) Temp  Min: 97.9 °F (36.6 °C)  Max: 99 °F (37.2 °C)   Pulse 62 Pulse  Min: 60  Max: 81   Resp 14 Resp  Min: 9  Max: 40   /77 BP  Min: 94/62  Max: 135/82   O2 Sat 97 % SpO2  Min: 94 %  Max: 97 %      Intake/Output Summary (Last 24 hours) at 3/2/2024 0722  Last data filed at 3/2/2024 0400  Gross per 24 hour   Intake 240 ml   Output 1005 ml   Net -765 ml       Diet Regular; Regular House    Invasive Monitoring           Physical Exam   Physical Exam  Eyes:      Extraocular Movements: Extraocular movements intact.      Pupils: Pupils are equal, round, and reactive to light.   HENT:      Head: Normocephalic and atraumatic.   Cardiovascular:      Rate and Rhythm: Normal rate and regular rhythm.      Heart sounds: Normal heart sounds.    Musculoskeletal:         General: Normal range of motion.   Abdominal: General: There is no distension.      Palpations: Abdomen is soft.      Tenderness: There is no abdominal tenderness.   Constitutional:       General: He is not in acute distress.     Appearance: He is well-developed and well-nourished. He is not ill-appearing.   Pulmonary:      Effort: Pulmonary effort is normal. No accessory muscle usage, respiratory distress or accessory muscle usage.      Breath sounds: Normal breath sounds.   Neurological:      General: No focal deficit present.      Mental Status: He is alert and oriented to person, place and time. Mental status is at baseline. He is calm.      Motor: Strength full and intact in all extremities.            Diagnostic Studies      EKG:   Imaging:  I have personally reviewed pertinent reports.   and I have personally reviewed pertinent films in PACS     Medications:  Scheduled PRN   aspirin, 81 mg, Daily  azithromycin, 250 mg, Once per day on Monday Wednesday Friday  benzonatate, 100 mg, TID  bisacodyl, 5 mg, Daily  chlorhexidine, 15 mL, Q12H RUFINA  enoxaparin, 40 mg, Daily  finasteride, 5 mg, Daily  fluticasone-vilanterol, 1 puff, Daily  lidocaine, 1 patch, Daily  losartan, 12.5 mg, Daily  pantoprazole, 40 mg, BID AC  polyethylene glycol, 17 g, Daily  predniSONE, 5 mg, Daily  senna-docusate sodium, 1 tablet, HS  tamsulosin, 0.4 mg, Daily With Dinner  umeclidinium, 1 puff, Daily      acetaminophen, 650 mg, Q6H PRN  albuterol, 2 puff, Q4H PRN  bisacodyl, 10 mg, Daily PRN  Hydrocod David-Chlorphe David ER, 5 mL, Q12H PRN  oxyCODONE, 2.5 mg, Q6H PRN  oxyCODONE-acetaminophen, 2 tablet, Q6H PRN       Continuous          Labs:    CBC    Recent Labs     03/01/24  0449   WBC 7.48   HGB 13.2   HCT 41.6        BMP    Recent Labs     03/01/24  0449   SODIUM 138   K 3.7      CO2 28   AGAP 8   BUN 19   CREATININE 0.67   CALCIUM 8.9       Coags    No recent results     Additional  Electrolytes  No recent results       Blood Gas    No recent results  No recent results LFTs  No recent results    Infectious  No recent results  Glucose  Recent Labs     03/01/24  0449   GLUC 94               Darnell Villegas MD

## 2024-03-04 ENCOUNTER — PATIENT OUTREACH (OUTPATIENT)
Dept: CASE MANAGEMENT | Facility: OTHER | Age: 69
End: 2024-03-04

## 2024-03-04 ENCOUNTER — TRANSITIONAL CARE MANAGEMENT (OUTPATIENT)
Dept: FAMILY MEDICINE CLINIC | Facility: CLINIC | Age: 69
End: 2024-03-04

## 2024-03-04 DIAGNOSIS — J43.9 PULMONARY EMPHYSEMA, UNSPECIFIED EMPHYSEMA TYPE (HCC): Primary | ICD-10-CM

## 2024-03-04 NOTE — PROGRESS NOTES
OP RT CM received in basket and referral from patient PCP.   .OP RT CM called and left a VM requesting a return phone call. I will outreach next week unless I hear back from patient prior.

## 2024-03-05 ENCOUNTER — PATIENT OUTREACH (OUTPATIENT)
Dept: CASE MANAGEMENT | Facility: OTHER | Age: 69
End: 2024-03-05

## 2024-03-05 NOTE — PROGRESS NOTES
OP RT CM received incoming call from Vern returning my call from yesterday.   .OP RT CM called and spoke with patient regarding their breathing, medications and Zepher valves.   Vern recently underwent Zepher valve procedure. He is feeing well and stated he feels a bot less SOB. He is coughing up clear slightly blood tinged sputum. He was told this is to be expected and I agree and reinforced.  Vern is using Trelegy Qday and rinsing gout mouth.  He is sensitive to the nebulizer aerosol therefor does not use however I told him HFA option for Albuterol is available if he needed.   Vern does not use O2. He has a f/u appt. With pulmonary tomorrow and with Dr. Olivarez early April. We reviewed dates, times, location and provider for both appt. Confirmed with Vern.   Vern received the COPD booklet and zone tools and denies any questions at this time.  I will outreach in three weeks and Vern has my contact information.

## 2024-03-06 ENCOUNTER — OFFICE VISIT (OUTPATIENT)
Dept: PULMONOLOGY | Facility: CLINIC | Age: 69
End: 2024-03-06
Payer: MEDICARE

## 2024-03-06 VITALS
SYSTOLIC BLOOD PRESSURE: 132 MMHG | BODY MASS INDEX: 19.11 KG/M2 | DIASTOLIC BLOOD PRESSURE: 88 MMHG | HEART RATE: 78 BPM | HEIGHT: 69 IN | TEMPERATURE: 97.9 F | WEIGHT: 129 LBS | OXYGEN SATURATION: 95 %

## 2024-03-06 DIAGNOSIS — J44.9 COPD, VERY SEVERE (HCC): Primary | ICD-10-CM

## 2024-03-06 DIAGNOSIS — F17.211 CIGARETTE NICOTINE DEPENDENCE IN REMISSION: ICD-10-CM

## 2024-03-06 PROCEDURE — 99214 OFFICE O/P EST MOD 30 MIN: CPT | Performed by: NURSE PRACTITIONER

## 2024-03-06 RX ORDER — ALBUTEROL SULFATE 90 UG/1
2 POWDER, METERED RESPIRATORY (INHALATION) 4 TIMES DAILY PRN
Qty: 1 EACH | Refills: 3 | Status: SHIPPED | OUTPATIENT
Start: 2024-03-06

## 2024-03-06 NOTE — PROGRESS NOTES
Pulmonary Follow-Up Note   Samuel Flores Jr. 68 y.o. male MRN: [id number]  3/6/2024      Assessment/Plan:    Diagnoses and all orders for this visit:    COPD, very severe (HCC)  Feels symptoms are greatly improved compared with prior to BLVR. Lungs are clear to exam.  Continue daily triple therapy (Currently Breo plus Incruse)  Albuterol HFA as needed  Prior nebulizer treatments made him much worse symptomatically - will not do nebulizers again. Trial of ProAir RespiClick for symptomatic relief of wheezing or shortness of breath, 2 puffs up to 4x per day when needed.  CT chest coming up 3/26/24; follow up with Dr Olivarez 4/3/24    Cigarette nicotine dependence in remission      Vaccines: Up to date    Return for As scheduled with Dr Olivarez.    All of Vern's questions were answered prior to leaving the office today.  He is aware to call our office with any further questions or concerns.    History of Present Illness   Reason for Visit: follow up  Chief Complaint: cough  HPI: Samuel Flores Jr. is a 68 y.o. male who presents to the office today following up after BLVR. He was started on Trelegy post-hospital and DUoNeb via nebulizer, in addition to his typical 5mg prednisone daily and azithromycin M-W-F. He did not require supplemental O2 upon discharge.    Preoperatively he describes having to stop at the base of the steps to catch his breath, then climb jail and catch his breath, then catch his breath at the top of the steps. Now can climb the full flight of stairs with minimal shortness of breath. He has not started pulmonary rehab - wants to wait until early April.    He has no significant cough but he is taking tessalon. Minimal phlegm production, lessening streaks of blood in his mucus production. He is on triple therapy in the from of Breo + Incruse daily - will be switching to Trelegy soon.    Review of Systems   Constitutional:  Positive for appetite change. Negative for fever.   HENT:   Negative for ear pain, postnasal drip, rhinorrhea, sneezing, sore throat and trouble swallowing.    Respiratory:  Positive for shortness of breath. Negative for cough, chest tightness and wheezing.    Cardiovascular:  Positive for chest pain.   Musculoskeletal:  Positive for myalgias.   Neurological:  Negative for headaches.     Answers submitted by the patient for this visit:  Pulmonology Questionnaire (Submitted on 2/29/2024)  Chief Complaint: Primary symptoms  Do you have difficulty breathing?: Yes  Do you experience frequent throat clearing?: Yes  Chronicity: chronic  When did you first notice your symptoms?: more than 1 year ago  How often do your symptoms occur?: constantly  Since you first noticed this problem, how has it changed?: unchanged  Do you have shortness of breath that occurs with effort or exertion?: Yes  Do you have ear congestion?: No  Do you have heartburn?: Yes  Do you have fatigue?: Yes  Do you have nasal congestion?: Yes  Do you have shortness of breath when lying flat?: No  Do you have shortness of breath when you wake up?: Yes  Do you have sweats?: No  Have you experienced weight loss?: Yes  Which of the following makes your symptoms worse?: any activity, climbing stairs, eating, emotional stress, exercise, minimal activity, strenuous activity  Which of the following makes your symptoms better?: nothing  Risk factors for lung disease: animal exposure      Historical Information   Past Medical History:   Diagnosis Date    Abnormal chest x-ray with multiple lung nodules     last assessed: April 25, 2012    Anxiety     Arthritis     Bipolar 2 disorder, major depressive episode (HCC)     Chronic tension headaches     last assessed: April 25, 2012    COPD (chronic obstructive pulmonary disease) ([facility])     Depression 08/17/1980    Emphysema of lung ([facility]) 1/01/2017    Noticed from my first ct scan    GERD (gastroesophageal reflux disease)     Not sure of first occurance. Haven’t experienced any  recent bouts.    Hypertension     Insomnia     Rheumatoid arthritis ([facility]) 10/01/1975    Scoliosis     Substance abuse ([facility]) 1970    Use of marajuana. Some experiments with cocain, crank and lsd.     Past Surgical History:   Procedure Laterality Date    CHOLECYSTECTOMY OPEN  2021    COLONOSCOPY      - [facility]    TONSILLECTOMY       Family History   Problem Relation Age of Onset    Stroke Mother     Other Mother         epilepsy     Vision loss Mother     Prostate cancer Father     Dementia Father     Depression Brother     Bipolar disorder Brother     Heart disease Brother      Social History   Social History     Substance and Sexual Activity   Alcohol Use Yes    Alcohol/week: 1.0 standard drink of alcohol    Types: 1 Glasses of wine per week    Comment: Not even 1glass of wine per week. Often go for months on end     Social History     Substance and Sexual Activity   Drug Use Not Currently    Types: Cocaine, Hashish, LSD, Marijuana     Social History     Tobacco Use   Smoking Status Former    Current packs/day: 0.00    Average packs/day: 1 pack/day for 54.0 years (54.0 ttl pk-yrs)    Types: Cigarettes    Start date: 1969    Quit date: 2023    Years since quittin.5   Smokeless Tobacco Never     E-Cigarette/Vaping    E-Cigarette Use Never User      E-Cigarette/Vaping Substances    Nicotine No     THC No     CBD No     Flavoring No     Other No     Unknown No        Meds/Allergies     Current Outpatient Medications:     Albuterol Sulfate (ProAir RespiClick) 108 (90 Base) MCG/ACT AEPB, Inhale 2 puffs 4 (four) times a day as needed (wheezing, shortness of breath), Disp: 1 each, Rfl: 3    aspirin 81 mg chewable tablet, Chew 81 mg daily, Disp: , Rfl:     azithromycin (ZITHROMAX) 250 mg tablet, Take 1 tablet (250 mg total) by mouth 3 (three) times a week Take 2 tablets today then 1 tablet daily x 4 days, Disp: 24 tablet, Rfl: 3    benzonatate (TESSALON PERLES) 100 mg capsule, Take 1 capsule (100 mg  "total) by mouth 3 (three) times a day, Disp: 20 capsule, Rfl: 0    finasteride (PROSCAR) 5 mg tablet, Take 1 tablet (5 mg total) by mouth daily for 360 doses, Disp: 90 tablet, Rfl: 3    fluticasone-umeclidinium-vilanterol (Trelegy Ellipta) 200-62.5-25 mcg/actuation AEPB inhaler, Inhale 1 puff daily Rinse mouth after use., Disp: 60 blister, Rfl: 0    losartan (COZAAR) 25 mg tablet, Take 12.5 mg by mouth daily, Disp: , Rfl:     oxyCODONE-acetaminophen (PERCOCET) 7.5-325 MG per tablet, Take 1 tablet by mouth every 6 (six) hours as needed for moderate pain or severe pain, Disp: , Rfl:     pantoprazole (PROTONIX) 40 mg tablet, Take 1 tablet (40 mg total) by mouth daily, Disp: 90 tablet, Rfl: 3    predniSONE 5 mg tablet, Take 1 tablet (5 mg total) by mouth daily, Disp: 90 tablet, Rfl: 3    tamsulosin (FLOMAX) 0.4 mg, Take 1 capsule (0.4 mg total) by mouth daily with dinner, Disp: 90 capsule, Rfl: 2  No Known Allergies    Vitals: Blood pressure 132/88, pulse 78, temperature 97.9 °F (36.6 °C), temperature source Tympanic, height 5' 9\" (1.753 m), weight 58.5 kg (129 lb), SpO2 95%. Body mass index is 19.05 kg/m². Oxygen Therapy  SpO2: 95 %  Oxygen Therapy: None (Room air)      Physical Exam  Vitals reviewed.   Constitutional:       Appearance: Normal appearance.   HENT:      Head: Normocephalic.      Nose: Nose normal.      Mouth/Throat:      Mouth: Mucous membranes are moist.      Pharynx: Oropharynx is clear.   Eyes:      Conjunctiva/sclera: Conjunctivae normal.      Pupils: Pupils are equal, round, and reactive to light.   Cardiovascular:      Rate and Rhythm: Normal rate and regular rhythm.      Pulses: Normal pulses.      Heart sounds: Normal heart sounds.   Pulmonary:      Effort: Pulmonary effort is normal.      Breath sounds: Normal breath sounds.   Abdominal:      General: Abdomen is flat. Bowel sounds are normal.      Palpations: Abdomen is soft.   Musculoskeletal:         General: Normal range of motion.   Skin:    " " General: Skin is warm and dry.      Capillary Refill: Capillary refill takes less than 2 seconds.   Neurological:      General: No focal deficit present.      Mental Status: He is alert and oriented to person, place, and time. Mental status is at baseline.   Psychiatric:         Mood and Affect: Mood normal.         Behavior: Behavior normal.         Thought Content: Thought content normal.         Judgment: Judgment normal.         Labs:   Lab Results   Component Value Date    WBC 7.48 03/01/2024    HGB 13.2 03/01/2024    HCT 41.6 03/01/2024    MCV 94 03/01/2024     03/01/2024    EOSPCT 5 03/01/2024    EOSABS 0.40 03/01/2024    MONOPCT 9 03/01/2024     Lab Results   Component Value Date    GLUCOSE 86 06/21/2016    CALCIUM 8.9 03/01/2024     06/21/2016    K 3.7 03/01/2024    CO2 28 03/01/2024     03/01/2024    BUN 19 03/01/2024    CREATININE 0.67 03/01/2024     No results found for: \"IGE\", \"RAST\"  Lab Results   Component Value Date    ALT 16 11/12/2023    AST 18 11/12/2023    ALKPHOS 58 11/12/2023    BILITOT <0.2 06/21/2016         Imaging and other studies: I have personally reviewed pertinent reports.   and I have personally reviewed pertinent films in PACS  CXR 3/2/24  Persistent and stable opacities in the left mid to lower lung.         ARTURO Padron  [facility]        Portions of the record may have been created with voice recognition software.  Occasional wrong word or \"sound a like\" substitutions may have occurred due to the inherent limitations of voice recognition software.  Read the chart carefully and recognize, using context, where substitutions have occurred or contact the dictating provider.  "

## 2024-03-26 ENCOUNTER — PATIENT OUTREACH (OUTPATIENT)
Dept: CASE MANAGEMENT | Facility: OTHER | Age: 69
End: 2024-03-26

## 2024-03-26 ENCOUNTER — HOSPITAL ENCOUNTER (OUTPATIENT)
Dept: CT IMAGING | Facility: HOSPITAL | Age: 69
Discharge: HOME | End: 2024-03-26
Attending: INTERNAL MEDICINE
Payer: MEDICARE

## 2024-03-26 ENCOUNTER — HOSPITAL ENCOUNTER (OUTPATIENT)
Dept: PULMONOLOGY | Facility: HOSPITAL | Age: 69
Discharge: HOME | End: 2024-03-26
Attending: INTERNAL MEDICINE
Payer: MEDICARE

## 2024-03-26 DIAGNOSIS — J44.9 COPD, VERY SEVERE (HCC): ICD-10-CM

## 2024-03-26 PROCEDURE — 94010 BREATHING CAPACITY TEST: CPT | Performed by: STUDENT IN AN ORGANIZED HEALTH CARE EDUCATION/TRAINING PROGRAM

## 2024-03-26 PROCEDURE — 94726 PLETHYSMOGRAPHY LUNG VOLUMES: CPT

## 2024-03-26 PROCEDURE — 94618 PULMONARY STRESS TESTING: CPT | Performed by: STUDENT IN AN ORGANIZED HEALTH CARE EDUCATION/TRAINING PROGRAM

## 2024-03-26 PROCEDURE — 94729 DIFFUSING CAPACITY: CPT

## 2024-03-26 PROCEDURE — 71250 CT THORAX DX C-: CPT

## 2024-03-26 PROCEDURE — 94729 DIFFUSING CAPACITY: CPT | Performed by: STUDENT IN AN ORGANIZED HEALTH CARE EDUCATION/TRAINING PROGRAM

## 2024-03-26 PROCEDURE — 94060 EVALUATION OF WHEEZING: CPT

## 2024-03-26 PROCEDURE — 94726 PLETHYSMOGRAPHY LUNG VOLUMES: CPT | Performed by: STUDENT IN AN ORGANIZED HEALTH CARE EDUCATION/TRAINING PROGRAM

## 2024-03-26 PROCEDURE — 94761 N-INVAS EAR/PLS OXIMETRY MLT: CPT

## 2024-03-26 RX ORDER — ALBUTEROL SULFATE 2.5 MG/3ML
2.5 SOLUTION RESPIRATORY (INHALATION) ONCE
Status: DISCONTINUED | OUTPATIENT
Start: 2024-03-26 | End: 2024-03-30 | Stop reason: HOSPADM

## 2024-03-26 NOTE — PROGRESS NOTES
.OP RT CM called and left a VM requesting a return phone call. I will outreach in two weeks unless I hear back from patient prior. In basket message received

## 2024-04-03 ENCOUNTER — OFFICE VISIT (OUTPATIENT)
Dept: PULMONOLOGY | Facility: CLINIC | Age: 69
End: 2024-04-03
Payer: MEDICARE

## 2024-04-03 VITALS
HEART RATE: 91 BPM | OXYGEN SATURATION: 96 % | SYSTOLIC BLOOD PRESSURE: 120 MMHG | DIASTOLIC BLOOD PRESSURE: 78 MMHG | TEMPERATURE: 97.9 F

## 2024-04-03 DIAGNOSIS — Z01.811 PREOPERATIVE RESPIRATORY EXAMINATION: ICD-10-CM

## 2024-04-03 DIAGNOSIS — J44.9 COPD, VERY SEVERE (HCC): Primary | ICD-10-CM

## 2024-04-03 PROCEDURE — 99215 OFFICE O/P EST HI 40 MIN: CPT | Performed by: INTERNAL MEDICINE

## 2024-04-03 RX ORDER — FLUTICASONE FUROATE, UMECLIDINIUM BROMIDE AND VILANTEROL TRIFENATATE 200; 62.5; 25 UG/1; UG/1; UG/1
1 POWDER RESPIRATORY (INHALATION) DAILY
Qty: 60 BLISTER | Refills: 11 | Status: SHIPPED | OUTPATIENT
Start: 2024-04-03 | End: 2025-03-29

## 2024-04-03 NOTE — PROGRESS NOTES
Pulmonary Follow Up Note   Samuel Flores Jr. 68 y.o. male MRN: [id number]  4/3/2024      Assessment/Plan:     COPD, very severe (HCC)  S/p Bronchoscopic lung volume reduction  Date of procedure: 2/26/2024  Lobe(s) treated: LLL  Number of valves: 2     10/4/23 3/26/24 % change   FEV1, L 0.63 L 1.03 L +63%   FEV1, % pred 20 % 33 %    RV, L 6.68 L 5.25 L -21%   RV, % pred 279 % 218 %    6MWD 126 m 138 m    Patient has shown remarkable improvement since having valves placed in the left lower lobe.  His exercise capacity has improved.  He would still like to be able to do more activities and understands that he will still have some limitation due to severe underlying emphysema.  We will have him participate in pulmonary rehabilitation.  He will continue with [facility].  Will defer to Dr. Davenport as to ongoing use of prednisone which has seemed to control his symptoms.     He inquired about whether treating the right lower lobe would be an option.  Given the degree of bullous emphysema on the right, pneumothorax risk is near prohibitive.  He also asked about lung transplant, which I would not pursue at this time given some improvement in his quality of life.  He would have a few more years for lung transplant to be an option    In the event patient moves out of this area, I asked that he research local physicians that perform BLVR.  There are some practices in the [facility] that would be an option should he decide to move there    He will follow-up with Dr. Davenport in 6 months and with me in 1 year if he remains in the area.  He understands that he should call if any sudden change in his pulmonary status that would suggest any issues with the valves or his underlying COPD    Preoperative respiratory examination  Patient is scheduled for EGD and colonoscopy at the end of this month.  He may proceed from pulmonary perspective      Return in about 6 months (around 10/3/2024).    Visit orders:    Diagnoses  and all orders for this visit:    COPD, very severe (HCC)  -     Ambulatory Referral to Pulmonary Rehabilitation; Future  -     fluticasone-umeclidinium-vilanterol (Trelegy Ellipta) 200-62.5-25 mcg/actuation AEPB inhaler; Inhale 1 puff daily Rinse mouth after use.    Preoperative respiratory examination          History of Present Illness   HPI:  Samuel Flores Jr. is a 68 y.o. male who is here today for follow-up regarding very severe COPD with hyperinflation status post bronchoscopic lung volume reduction with 2 valves placed in left lower lobe on 2/26/2024.  Since having the procedure, patient has noted significant improvement in his ability to perform activities of daily living.  He is now able to walk up stairs without stopping and has even been taking walks with his dog.  He is using Trelegy Ellipta once daily and prednisone 5 mg daily.  He is intolerant of short acting bronchodilators.  He has an occasional cough without much sputum production.  He is going through the process of  from his wife and is considering moving to either [facility] or [facility]    Review of Systems   Constitutional:  Positive for appetite change. Negative for fever.   HENT:  Negative for ear pain, postnasal drip, rhinorrhea, sneezing, sore throat and trouble swallowing.    Respiratory:  Positive for shortness of breath.    Cardiovascular:  Positive for chest pain.   Musculoskeletal:  Positive for myalgias.   Neurological:  Negative for headaches.   All other systems reviewed and are negative.        Medical, Family and Social history reviewed and updated as appropriate    Historical Information   Past Medical History:   Diagnosis Date    Abnormal chest x-ray with multiple lung nodules     last assessed: April 25, 2012    Anxiety     Arthritis     Bipolar 2 disorder, major depressive episode (HCC)     Chronic tension headaches     last assessed: April 25, 2012    COPD (chronic obstructive pulmonary disease) ([facility])      Depression 1980    Emphysema of lung ([facility]) 2017    Noticed from my first ct scan    GERD (gastroesophageal reflux disease)     Not sure of first occurance. Haven’t experienced any recent bouts.    Hypertension     Insomnia     Rheumatoid arthritis ([facility]) 10/01/1975    Scoliosis     Substance abuse ([facility]) 1970    Use of marajuana. Some experiments with cocain, crank and lsd.     Past Surgical History:   Procedure Laterality Date    CHOLECYSTECTOMY OPEN  2021    COLONOSCOPY       [facility]    TONSILLECTOMY       Family History   Problem Relation Age of Onset    Stroke Mother     Other Mother         epilepsy     Vision loss Mother     Prostate cancer Father     Dementia Father     Depression Brother     Bipolar disorder Brother     Heart disease Brother        Social History     Tobacco Use   Smoking Status Former    Current packs/day: 0.00    Average packs/day: 1 pack/day for 54.0 years (54.0 ttl pk-yrs)    Types: Cigarettes    Start date: 1969    Quit date: 2023    Years since quittin.5   Smokeless Tobacco Never         Meds/Allergies     Current Outpatient Medications:     aspirin 81 mg chewable tablet, Chew 81 mg daily, Disp: , Rfl:     azithromycin (ZITHROMAX) 250 mg tablet, Take 1 tablet (250 mg total) by mouth 3 (three) times a week Take 2 tablets today then 1 tablet daily x 4 days, Disp: 24 tablet, Rfl: 3    benzonatate (TESSALON PERLES) 100 mg capsule, Take 1 capsule (100 mg total) by mouth 3 (three) times a day, Disp: 20 capsule, Rfl: 0    finasteride (PROSCAR) 5 mg tablet, Take 1 tablet (5 mg total) by mouth daily for 360 doses, Disp: 90 tablet, Rfl: 3    fluticasone-umeclidinium-vilanterol (Trelegy Ellipta) 200-62.5-25 mcg/actuation AEPB inhaler, Inhale 1 puff daily Rinse mouth after use., Disp: 60 blister, Rfl: 11    losartan (COZAAR) 25 mg tablet, Take 12.5 mg by mouth daily, Disp: , Rfl:     oxyCODONE-acetaminophen (PERCOCET) 7.5-325 MG per tablet, Take 1 tablet by  "mouth every 6 (six) hours as needed for moderate pain or severe pain, Disp: , Rfl:     pantoprazole (PROTONIX) 40 mg tablet, Take 1 tablet (40 mg total) by mouth daily, Disp: 90 tablet, Rfl: 3    predniSONE 5 mg tablet, Take 1 tablet (5 mg total) by mouth daily, Disp: 90 tablet, Rfl: 3    tamsulosin (FLOMAX) 0.4 mg, Take 1 capsule (0.4 mg total) by mouth daily with dinner, Disp: 90 capsule, Rfl: 2    Albuterol Sulfate (ProAir RespiClick) 108 (90 Base) MCG/ACT AEPB, Inhale 2 puffs 4 (four) times a day as needed (wheezing, shortness of breath), Disp: 1 each, Rfl: 3  No Known Allergies    Vitals: Blood pressure 120/78, pulse 91, temperature 97.9 °F (36.6 °C), temperature source Tympanic, SpO2 96%. There is no height or weight on file to calculate BMI. Oxygen Therapy  SpO2: 96 %  Oxygen Therapy: None (Room air)    Physical Exam   Physical Exam  Constitutional:       General: He is not in acute distress.     Appearance: Normal appearance.   HENT:      Head: Normocephalic.   Eyes:      General: No scleral icterus.  Neck:      Vascular: No JVD.   Cardiovascular:      Rate and Rhythm: Normal rate and regular rhythm.   Pulmonary:      Breath sounds: No wheezing, rhonchi or rales.   Musculoskeletal:         General: No deformity.      Cervical back: Neck supple.   Skin:     General: Skin is warm and dry.   Neurological:      Mental Status: He is alert and oriented to person, place, and time.   Psychiatric:         Mood and Affect: Mood normal.         Labs: I have personally reviewed pertinent lab results.  Lab Results   Component Value Date    WBC 7.48 03/01/2024    HGB 13.2 03/01/2024    HCT 41.6 03/01/2024    MCV 94 03/01/2024     03/01/2024     Lab Results   Component Value Date    GLUCOSE 86 06/21/2016    CALCIUM 8.9 03/01/2024     06/21/2016    K 3.7 03/01/2024    CO2 28 03/01/2024     03/01/2024    BUN 19 03/01/2024    CREATININE 0.67 03/01/2024     No results found for: \"IGE\"  Lab Results "   Component Value Date    ALT 16 11/12/2023    AST 18 11/12/2023    ALKPHOS 58 11/12/2023    BILITOT <0.2 06/21/2016       Imaging and other studies: I have personally reviewed pertinent reports.   and I have personally reviewed pertinent films in PACS chest CT on 3/26/2024 shows valves in good position and left lower lobe with complete left lower lobe atelectasis.  Extensive emphysema throughout both lungs    Pulmonary function testing:  Performed 3/26/2024 and personally interpreted  FEV1/FVC ratio 41%   FEV1 33% predicted  FVC 61% predicted.   % predicted   % predicted  DLCO corrected for hemoglobin 46 % predicted.  PFTs with severe obstruction, severe air trapping and severe diffusion impairment    Answers submitted by the patient for this visit:  Pulmonology Questionnaire (Submitted on 3/29/2024)  Chief Complaint: Primary symptoms  Do you have difficulty breathing?: Yes  Do you experience frequent throat clearing?: Yes  Chronicity: chronic  When did you first notice your symptoms?: more than 1 year ago  How often do your symptoms occur?: constantly  Since you first noticed this problem, how has it changed?: gradually improving  Do you have shortness of breath that occurs with effort or exertion?: Yes  Do you have ear congestion?: No  Do you have heartburn?: Yes  Do you have fatigue?: Yes  Do you have nasal congestion?: Yes  Do you have shortness of breath when lying flat?: No  Do you have shortness of breath when you wake up?: Yes  Do you have sweats?: Yes  Have you experienced weight loss?: Yes  Which of the following makes your symptoms worse?: change in weather, climbing stairs, eating, emotional stress, exercise, exposure to smoke, minimal activity, pollen, strenuous activity  Which of the following makes your symptoms better?: nothing  Risk factors for lung disease: animal exposure, smoking/tobacco exposure

## 2024-04-03 NOTE — ASSESSMENT & PLAN NOTE
Patient is scheduled for EGD and colonoscopy at the end of this month.  He may proceed from pulmonary perspective

## 2024-04-03 NOTE — LETTER
April 3, 2024     Roverto Kebede DO    Patient: Samuel Flores Jr.   YOB: 1955   Date of Visit: 4/3/2024       Dear Dr. Kebede:    Thank you for referring Samuel Flores to me for evaluation. Below are my notes for this consultation.    If you have questions, please do not hesitate to call me. I look forward to following your patient along with you.         Sincerely,        Lorraine Olivarez DO        CC: DO Hesham Seo MD Deborah Stahlnecker, DO  4/3/2024 11:47 AM  Sign when Signing Visit  Pulmonary Follow Up Note   Samuel Flores Jr. 68 y.o. male MRN: [id number]  4/3/2024      Assessment/Plan:     COPD, very severe (HCC)  S/p Bronchoscopic lung volume reduction  Date of procedure: 2/26/2024  Lobe(s) treated: LLL  Number of valves: 2     10/4/23 3/26/24 % change   FEV1, L 0.63 L 1.03 L +63%   FEV1, % pred 20 % 33 %    RV, L 6.68 L 5.25 L -21%   RV, % pred 279 % 218 %    6MWD 126 m 138 m    Patient has shown remarkable improvement since having valves placed in the left lower lobe.  His exercise capacity has improved.  He would still like to be able to do more activities and understands that he will still have some limitation due to severe underlying emphysema.  We will have him participate in pulmonary rehabilitation.  He will continue with [facility].  Will defer to Dr. Davenport as to ongoing use of prednisone which has seemed to control his symptoms.     He inquired about whether treating the right lower lobe would be an option.  Given the degree of bullous emphysema on the right, pneumothorax risk is near prohibitive.  He also asked about lung transplant, which I would not pursue at this time given some improvement in his quality of life.  He would have a few more years for lung transplant to be an option    In the event patient moves out of this area, I asked that he research local physicians that perform BLVR.  There are some practices in the [facility] that would be an option should he decide to move there    He will follow-up with Dr. Davenport in 6 months and with me in 1 year if he remains in the area.  He understands that he should call if any sudden change in his pulmonary status that would suggest any issues with the valves or his underlying COPD    Preoperative respiratory examination  Patient is scheduled for EGD and colonoscopy at the end of this month.  He may proceed from pulmonary perspective      Return in about 6 months (around 10/3/2024).    Visit orders:    Diagnoses and all orders for this visit:    COPD, very severe (HCC)  -     Ambulatory Referral to Pulmonary Rehabilitation; Future  -     fluticasone-umeclidinium-vilanterol (Trelegy Ellipta) 200-62.5-25 mcg/actuation AEPB inhaler; Inhale 1 puff daily Rinse mouth after use.    Preoperative respiratory examination          History of Present Illness  HPI:  Samuel Flores Jr. is a 68 y.o. male who is here today for follow-up regarding very severe COPD with hyperinflation status post bronchoscopic lung volume reduction with 2 valves placed in left lower lobe on 2/26/2024.  Since having the procedure, patient has noted significant improvement in his ability to perform activities of daily living.  He is now able to walk up stairs without stopping and has even been taking walks with his dog.  He is using Trelegy Ellipta once daily and prednisone 5 mg daily.  He is intolerant of short acting bronchodilators.  He has an occasional cough without much sputum production.  He is going through the process of  from his wife and is considering moving to either [facility] or [facility]    Review of Systems   Constitutional:  Positive for appetite change. Negative for fever.   HENT:  Negative for ear pain, postnasal drip, rhinorrhea, sneezing, sore throat and trouble swallowing.    Respiratory:  Positive for shortness of breath.    Cardiovascular:  Positive for chest pain.    Musculoskeletal:  Positive for myalgias.   Neurological:  Negative for headaches.   All other systems reviewed and are negative.        Medical, Family and Social history reviewed and updated as appropriate    Historical Information  Past Medical History:   Diagnosis Date   • Abnormal chest x-ray with multiple lung nodules     last assessed: 2012   • Anxiety    • Arthritis    • Bipolar 2 disorder, major depressive episode (HCC)    • Chronic tension headaches     last assessed: 2012   • COPD (chronic obstructive pulmonary disease) ([facility])    • Depression 1980   • Emphysema of lung ([facility]) 2017    Noticed from my first ct scan   • GERD (gastroesophageal reflux disease)     Not sure of first occurance. Haven’t experienced any recent bouts.   • Hypertension    • Insomnia    • Rheumatoid arthritis ([facility]) 10/01/1975   • Scoliosis    • Substance abuse ([facility]) 1970    Use of marajuana. Some experiments with cocain, crank and lsd.     Past Surgical History:   Procedure Laterality Date   • CHOLECYSTECTOMY OPEN  2021   • COLONOSCOPY      - [facility]   • TONSILLECTOMY       Family History   Problem Relation Age of Onset   • Stroke Mother    • Other Mother         epilepsy    • Vision loss Mother    • Prostate cancer Father    • Dementia Father    • Depression Brother    • Bipolar disorder Brother    • Heart disease Brother        Social History     Tobacco Use   Smoking Status Former   • Current packs/day: 0.00   • Average packs/day: 1 pack/day for 54.0 years (54.0 ttl pk-yrs)   • Types: Cigarettes   • Start date: 1969   • Quit date: 2023   • Years since quittin.5   Smokeless Tobacco Never         Meds/Allergies    Current Outpatient Medications:   •  aspirin 81 mg chewable tablet, Chew 81 mg daily, Disp: , Rfl:   •  azithromycin (ZITHROMAX) 250 mg tablet, Take 1 tablet (250 mg total) by mouth 3 (three) times a week Take 2 tablets today then 1 tablet daily x 4 days, Disp: 24  tablet, Rfl: 3  •  benzonatate (TESSALON PERLES) 100 mg capsule, Take 1 capsule (100 mg total) by mouth 3 (three) times a day, Disp: 20 capsule, Rfl: 0  •  finasteride (PROSCAR) 5 mg tablet, Take 1 tablet (5 mg total) by mouth daily for 360 doses, Disp: 90 tablet, Rfl: 3  •  fluticasone-umeclidinium-vilanterol (Trelegy Ellipta) 200-62.5-25 mcg/actuation AEPB inhaler, Inhale 1 puff daily Rinse mouth after use., Disp: 60 blister, Rfl: 11  •  losartan (COZAAR) 25 mg tablet, Take 12.5 mg by mouth daily, Disp: , Rfl:   •  oxyCODONE-acetaminophen (PERCOCET) 7.5-325 MG per tablet, Take 1 tablet by mouth every 6 (six) hours as needed for moderate pain or severe pain, Disp: , Rfl:   •  pantoprazole (PROTONIX) 40 mg tablet, Take 1 tablet (40 mg total) by mouth daily, Disp: 90 tablet, Rfl: 3  •  predniSONE 5 mg tablet, Take 1 tablet (5 mg total) by mouth daily, Disp: 90 tablet, Rfl: 3  •  tamsulosin (FLOMAX) 0.4 mg, Take 1 capsule (0.4 mg total) by mouth daily with dinner, Disp: 90 capsule, Rfl: 2  •  Albuterol Sulfate (ProAir RespiClick) 108 (90 Base) MCG/ACT AEPB, Inhale 2 puffs 4 (four) times a day as needed (wheezing, shortness of breath), Disp: 1 each, Rfl: 3  No Known Allergies    Vitals: Blood pressure 120/78, pulse 91, temperature 97.9 °F (36.6 °C), temperature source Tympanic, SpO2 96%. There is no height or weight on file to calculate BMI. Oxygen Therapy  SpO2: 96 %  Oxygen Therapy: None (Room air)    Physical Exam   Physical Exam  Constitutional:       General: He is not in acute distress.     Appearance: Normal appearance.   HENT:      Head: Normocephalic.   Eyes:      General: No scleral icterus.  Neck:      Vascular: No JVD.   Cardiovascular:      Rate and Rhythm: Normal rate and regular rhythm.   Pulmonary:      Breath sounds: No wheezing, rhonchi or rales.   Musculoskeletal:         General: No deformity.      Cervical back: Neck supple.   Skin:     General: Skin is warm and dry.   Neurological:      Mental  "Status: He is alert and oriented to person, place, and time.   Psychiatric:         Mood and Affect: Mood normal.         Labs: I have personally reviewed pertinent lab results.  Lab Results   Component Value Date    WBC 7.48 03/01/2024    HGB 13.2 03/01/2024    HCT 41.6 03/01/2024    MCV 94 03/01/2024     03/01/2024     Lab Results   Component Value Date    GLUCOSE 86 06/21/2016    CALCIUM 8.9 03/01/2024     06/21/2016    K 3.7 03/01/2024    CO2 28 03/01/2024     03/01/2024    BUN 19 03/01/2024    CREATININE 0.67 03/01/2024     No results found for: \"IGE\"  Lab Results   Component Value Date    ALT 16 11/12/2023    AST 18 11/12/2023    ALKPHOS 58 11/12/2023    BILITOT <0.2 06/21/2016       Imaging and other studies: I have personally reviewed pertinent reports.   and I have personally reviewed pertinent films in PACS chest CT on 3/26/2024 shows valves in good position and left lower lobe with complete left lower lobe atelectasis.  Extensive emphysema throughout both lungs    Pulmonary function testing:  Performed 3/26/2024 and personally interpreted  FEV1/FVC ratio 41%   FEV1 33% predicted  FVC 61% predicted.   % predicted   % predicted  DLCO corrected for hemoglobin 46 % predicted.  PFTs with severe obstruction, severe air trapping and severe diffusion impairment    Answers submitted by the patient for this visit:  Pulmonology Questionnaire (Submitted on 3/29/2024)  Chief Complaint: Primary symptoms  Do you have difficulty breathing?: Yes  Do you experience frequent throat clearing?: Yes  Chronicity: chronic  When did you first notice your symptoms?: more than 1 year ago  How often do your symptoms occur?: constantly  Since you first noticed this problem, how has it changed?: gradually improving  Do you have shortness of breath that occurs with effort or exertion?: Yes  Do you have ear congestion?: No  Do you have heartburn?: Yes  Do you have fatigue?: Yes  Do you have nasal " congestion?: Yes  Do you have shortness of breath when lying flat?: No  Do you have shortness of breath when you wake up?: Yes  Do you have sweats?: Yes  Have you experienced weight loss?: Yes  Which of the following makes your symptoms worse?: change in weather, climbing stairs, eating, emotional stress, exercise, exposure to smoke, minimal activity, pollen, strenuous activity  Which of the following makes your symptoms better?: nothing  Risk factors for lung disease: animal exposure, smoking/tobacco exposure

## 2024-04-03 NOTE — ASSESSMENT & PLAN NOTE
S/p Bronchoscopic lung volume reduction  Date of procedure: 2/26/2024  Lobe(s) treated: LLL  Number of valves: 2     10/4/23 3/26/24 % change   FEV1, L 0.63 L 1.03 L +63%   FEV1, % pred 20 % 33 %    RV, L 6.68 L 5.25 L -21%   RV, % pred 279 % 218 %    6MWD 126 m 138 m    Patient has shown remarkable improvement since having valves placed in the left lower lobe.  His exercise capacity has improved.  He would still like to be able to do more activities and understands that he will still have some limitation due to severe underlying emphysema.  We will have him participate in pulmonary rehabilitation.  He will continue with [facility].  Will defer to Dr. Davenport as to ongoing use of prednisone which has seemed to control his symptoms.     He inquired about whether treating the right lower lobe would be an option.  Given the degree of bullous emphysema on the right, pneumothorax risk is near prohibitive.  He also asked about lung transplant, which I would not pursue at this time given some improvement in his quality of life.  He would have a few more years for lung transplant to be an option    In the event patient moves out of this area, I asked that he research local physicians that perform BLVR.  There are some practices in the [facility] that would be an option should he decide to move there    He will follow-up with Dr. Davenport in 6 months and with me in 1 year if he remains in the area.  He understands that he should call if any sudden change in his pulmonary status that would suggest any issues with the valves or his underlying COPD

## 2024-04-05 ENCOUNTER — OFFICE VISIT (OUTPATIENT)
Dept: UROLOGY | Facility: CLINIC | Age: 69
End: 2024-04-05
Payer: MEDICARE

## 2024-04-05 ENCOUNTER — TELEPHONE (OUTPATIENT)
Dept: GASTROENTEROLOGY | Facility: CLINIC | Age: 69
End: 2024-04-05

## 2024-04-05 VITALS
DIASTOLIC BLOOD PRESSURE: 92 MMHG | HEART RATE: 104 BPM | SYSTOLIC BLOOD PRESSURE: 136 MMHG | WEIGHT: 134.2 LBS | HEIGHT: 69 IN | OXYGEN SATURATION: 94 % | BODY MASS INDEX: 19.88 KG/M2

## 2024-04-05 DIAGNOSIS — N40.1 BPH ASSOCIATED WITH NOCTURIA: Primary | ICD-10-CM

## 2024-04-05 DIAGNOSIS — R35.1 BPH ASSOCIATED WITH NOCTURIA: Primary | ICD-10-CM

## 2024-04-05 DIAGNOSIS — R31.29 MICROSCOPIC HEMATURIA: ICD-10-CM

## 2024-04-05 DIAGNOSIS — E44.1 MILD PROTEIN-CALORIE MALNUTRITION (HCC): ICD-10-CM

## 2024-04-05 DIAGNOSIS — N40.1 BENIGN PROSTATIC HYPERPLASIA WITH NOCTURIA: ICD-10-CM

## 2024-04-05 DIAGNOSIS — R35.1 BENIGN PROSTATIC HYPERPLASIA WITH NOCTURIA: ICD-10-CM

## 2024-04-05 LAB — POST-VOID RESIDUAL VOLUME, ML POC: 14 ML

## 2024-04-05 PROCEDURE — 99214 OFFICE O/P EST MOD 30 MIN: CPT | Performed by: UROLOGY

## 2024-04-05 PROCEDURE — 51798 US URINE CAPACITY MEASURE: CPT | Performed by: UROLOGY

## 2024-04-05 RX ORDER — TAMSULOSIN HYDROCHLORIDE 0.4 MG/1
0.8 CAPSULE ORAL
Qty: 90 CAPSULE | Refills: 3 | Status: SHIPPED | OUTPATIENT
Start: 2024-04-05

## 2024-04-05 NOTE — TELEPHONE ENCOUNTER
Patient called in to confirm procedure message received in [employer] states date as 4/25 however is 4/26 Friday clarification given to patient

## 2024-04-05 NOTE — PROGRESS NOTES
ASSESSMENT:     68 y.o. male  with tobacco abuse history, recent microscopic hematuria, need for updated prostate cancer screening    PLAN:     Patient had a small amount of gross hematuria after cystoscopy but has not had any additional issues.  Has undergone a [place] valve placement for his COPD with excellent improvement in his lung capacity.  Patient is feeling well.  He has noted dramatic improvement in his urinary symptoms on dual therapy Flomax and Proscar with reduction in nighttime urination to 2 times per night.  He is interested in seeing if further medication changes could optimize and so we agreed to double his Flomax dose today.  Patient will return in 1 years time for reassessment.    Consideration of prostate cancer screening will need to be weighed against the patient's significant stage IV pulmonary disease so future PSA and rectal exams at most would be every other year until age 73 or complete cessation could be considered given competing comorbidity      ---        UROLOGY PROCEDURE NOTE     CHIEF COMPLAINT   Samuel Flores Jr. is a 68 y.o. male with a complaint of   Chief Complaint   Patient presents with    Follow-up    Benign Prostatic Hypertrophy       History of Present Illness:   Samuel Flores Jr. is a 68 y.o. male here for evaluation of prostate enlargement.  Patient previously saw Dr. Concepcion in 2015.  Patient reported poor urinary stream, nocturia at that time.  Patient was offered Flomax at that visit.  Of note, patient was a significant smoker.  Patient has not had routine prostate cancer screening by review of the records.  Of note, he did refuse digital rectal exam at his last visit with Dr. Concepcion.    Patient returns.  He has significant stage IV COPD, not currently on oxygen.  He follows with pulmonology.  Patient's urinary symptoms have worsened with urgency and the significant need to void with minimal output.  He fluid restricts due to bathroom issues despite his  COPD.  He was recently started on Flomax by his primary care team.    Patient notes he had some gross hematuria after his cystoscopic procedure.  This resolved after 2 weeks.  No visible bleeding since this time.  On dual therapy Flomax and Proscar, the patient feels much improved.  He recently had a pulmonary surgery, [place] valve placement, which has dramatically improved his pulmonary capacity.  He overall feels very well.    PSA 11/8/23 - 1.36  Lab Results   Component Value Date    PSA 1.1 11/24/2021    PSA 1.0 03/10/2018       Urinary Score(s)     AUA SYMPTOM SCORE      Flowsheet Row Most Recent Value   AUA SYMPTOM SCORE    How often have you had a sensation of not emptying your bladder completely after you finished urinating? 1 (P)     How often have you had to urinate again less than two hours after you finished urinating? 5 (P)     How often have you found you stopped and started again several times when you urinate? 2 (P)     How often have you found it difficult to postpone urination? 3 (P)     How often have you had a weak urinary stream? 3 (P)     How often have you had to push or strain to begin urination? 1 (P)     How many times did you most typically get up to urinate from the time you went to bed at night until the time you got up in the morning? 5 (P)     Quality of Life: If you were to spend the rest of your life with your urinary condition just the way it is now, how would you feel about that? 5 (P)     AUA SYMPTOM SCORE 20 (P)            Past Medical History:     Past Medical History:   Diagnosis Date    Abnormal chest x-ray with multiple lung nodules     last assessed: April 25, 2012    Anxiety     Arthritis     Bipolar 2 disorder, major depressive episode (HCC)     Chronic tension headaches     last assessed: April 25, 2012    COPD (chronic obstructive pulmonary disease) ([facility])     Depression 08/17/1980    Emphysema of lung ([facility]) 1/01/2017    Noticed from my first ct scan    GERD  (gastroesophageal reflux disease)     Not sure of first occurance. Haven’t experienced any recent bouts.    Hypertension     Insomnia     Rheumatoid arthritis ([facility]) 10/01/1975    Scoliosis     Substance abuse ([facility]) 1/01/1970    Use of marajuana. Some experiments with cocain, crank and lsd.       PAST SURGICAL HISTORY:     Past Surgical History:   Procedure Laterality Date    CHOLECYSTECTOMY OPEN  01/2021    COLONOSCOPY      1-2016 [facility]    TONSILLECTOMY         CURRENT MEDICATIONS:     Current Outpatient Medications   Medication Sig Dispense Refill    aspirin 81 mg chewable tablet Chew 81 mg daily      azithromycin (ZITHROMAX) 250 mg tablet Take 1 tablet (250 mg total) by mouth 3 (three) times a week Take 2 tablets today then 1 tablet daily x 4 days 24 tablet 3    benzonatate (TESSALON PERLES) 100 mg capsule Take 1 capsule (100 mg total) by mouth 3 (three) times a day 20 capsule 0    finasteride (PROSCAR) 5 mg tablet Take 1 tablet (5 mg total) by mouth daily for 360 doses 90 tablet 3    fluticasone-umeclidinium-vilanterol (Trelegy Ellipta) 200-62.5-25 mcg/actuation AEPB inhaler Inhale 1 puff daily Rinse mouth after use. 60 blister 11    losartan (COZAAR) 25 mg tablet Take 12.5 mg by mouth daily      oxyCODONE-acetaminophen (PERCOCET) 7.5-325 MG per tablet Take 1 tablet by mouth every 6 (six) hours as needed for moderate pain or severe pain      pantoprazole (PROTONIX) 40 mg tablet Take 1 tablet (40 mg total) by mouth daily 90 tablet 3    predniSONE 5 mg tablet Take 1 tablet (5 mg total) by mouth daily 90 tablet 3    tamsulosin (FLOMAX) 0.4 mg Take 1 capsule (0.4 mg total) by mouth daily with dinner 90 capsule 2     No current facility-administered medications for this visit.       ALLERGIES:   No Known Allergies    SOCIAL HISTORY:     Social History     Socioeconomic History    Marital status: /Civil Union     Spouse name: None    Number of children: None    Years of education: None    Highest education level:  None   Occupational History    Occupation: retired    Tobacco Use    Smoking status: Former     Current packs/day: 0.00     Average packs/day: 1 pack/day for 54.0 years (54.0 ttl pk-yrs)     Types: Cigarettes     Start date: 1969     Quit date: 2023     Years since quittin.5    Smokeless tobacco: Never   Vaping Use    Vaping status: Never Used   Substance and Sexual Activity    Alcohol use: Yes     Alcohol/week: 1.0 standard drink of alcohol     Types: 1 Glasses of wine per week     Comment: Not even 1glass of wine per week. Often go for months on end    Drug use: Not Currently     Types: Cocaine, Hashish, LSD, Marijuana    Sexual activity: Not Currently     Partners: Female     Birth control/protection: Male Sterilization   Other Topics Concern    None   Social History Narrative    None     Social Determinants of Health     Financial Resource Strain: Low Risk  (2023)    Received from [facility]    Overall Financial Resource Strain (CARDIA)     Difficulty of Paying Living Expenses: Not hard at all   Food Insecurity: No Food Insecurity (2024)    Hunger Vital Sign     Worried About Running Out of Food in the Last Year: Never true     Ran Out of Food in the Last Year: Never true   Transportation Needs: No Transportation Needs (2024)    PRAPARE - Transportation     Lack of Transportation (Medical): No     Lack of Transportation (Non-Medical): No   Physical Activity: Not on file   Stress: Not on file   Social Connections: Not on file   Intimate Partner Violence: Not At Risk (2023)    Received from [facility]    Humiliation, Afraid, Rape, and Kick questionnaire     Fear of Current or Ex-Partner: No     Emotionally Abused: No     Physically Abused: No     Sexually Abused: No   Housing Stability: Low Risk  (2024)    Housing Stability Vital Sign     Unable to Pay for Housing in the Last Year: No     Number of Places Lived in the Last Year: 1      "Unstable Housing in the Last Year: No       SOCIAL HISTORY:     Family History   Problem Relation Age of Onset    Stroke Mother     Other Mother         epilepsy     Vision loss Mother     Prostate cancer Father     Dementia Father     Depression Brother     Bipolar disorder Brother     Heart disease Brother        REVIEW OF SYSTEMS:     Review of Systems   Constitutional:  Negative for chills and fever.   HENT:  Negative for ear pain and sore throat.    Eyes:  Negative for pain and visual disturbance.   Respiratory:  Positive for wheezing. Negative for cough, shortness of breath and stridor.    Cardiovascular:  Negative for chest pain and palpitations.   Gastrointestinal:  Negative for abdominal pain and vomiting.   Genitourinary:  Positive for difficulty urinating, frequency and urgency. Negative for dysuria and hematuria.   Musculoskeletal:  Negative for arthralgias and back pain.   Skin:  Negative for color change and rash.   Neurological:  Negative for seizures and syncope.   All other systems reviewed and are negative.        PHYSICAL EXAM:     /92 (BP Location: Left arm, Patient Position: Sitting, Cuff Size: Adult)   Pulse 104   Ht 5' 9\" (1.753 m)   Wt 60.9 kg (134 lb 3.2 oz)   SpO2 94%   BMI 19.82 kg/m²     Physical Exam  Vitals reviewed.   Constitutional:       General: He is not in acute distress.     Appearance: He is well-developed. He is not ill-appearing.   HENT:      Head: Normocephalic and atraumatic.   Eyes:      Pupils: Pupils are equal, round, and reactive to light.   Cardiovascular:      Rate and Rhythm: Normal rate.   Pulmonary:      Effort: No respiratory distress.      Breath sounds: Wheezing present.   Abdominal:      General: There is no distension.      Palpations: Abdomen is soft.      Tenderness: There is no abdominal tenderness.   Musculoskeletal:         General: Normal range of motion.      Cervical back: Normal range of motion and neck supple.   Skin:     General: Skin is " warm and dry.   Neurological:      Mental Status: He is alert and oriented to person, place, and time.   Psychiatric:         Behavior: Behavior normal.         LABS:     CBC:   Lab Results   Component Value Date    WBC 7.48 2024    HGB 13.2 2024    HCT 41.6 2024    MCV 94 2024     2024       BMP:   Lab Results   Component Value Date    GLUCOSE 86 2016    CALCIUM 8.9 2024     2016    K 3.7 2024    CO2 28 2024     2024    BUN 19 2024    CREATININE 0.67 2024     URINALYSIS   [facility]20[facility]  Component 2023           Glucose, Urine, POC -- -- -- Load older lab results   Ketone, Urine, POC -- -- -- Load older lab results   Nitrite, Urine, POC -- -- -- Load older lab results   Leukocytes, Urine -- -- -- Load older lab results   pH, Urine, POC -- -- -- Load older lab results   Blood, Urine Negative Negative 0.03 Abnormal     Load older lab results   Protein, Urine 10-29 Abnormal     Negative Negative Load older lab results   Specific Gravity, Urine, POC -- -- -- Load older lab results   Specific Gravity, Urine 1.038 High     1.022 1.015    pH, Urine 6.0 7.0 5.5    Glucose, Urine 30-69 Abnormal     Negative Negative    Ketone, Urine Trace Abnormal     Negative 10-39 Abnormal        Leukocytes Esterase Negative Negative Negative    Nitrite, Urine Negative Negative Negative    Comment SEE NOTES SEE NOTES --    RBC/HPF 3-5 -- --    WBC/HPF 0 -- --    Mucous Threads 1+ -- --        IMAGIN/9/23  CT ABDOMEN AND PELVIS WITH AND WITHOUT IV CONTRAST     INDICATION:   R31.29: Other microscopic hematuria  N39.41: Urge incontinence.     COMPARISON: CT abdomen pelvis 2015.     TECHNIQUE: Initial CT of the abdomen and pelvis was performed without intravenous contrast.  Subsequent dynamic CT evaluation of the abdomen and pelvis was performed after the administration of  intravenous contrast in both nephrographic and delayed   phases. Multiplanar 2D reformatted images were created from the source data.     This examination, like all CT scans performed in the [facility], was performed utilizing techniques to minimize radiation dose exposure, including the use of iterative reconstruction and automated exposure control. Radiation dose length   product (DLP) for this visit:  960.12 mGy-cm     IV Contrast:  100 mL of iohexol (OMNIPAQUE)  Enteric Contrast:  Enteric contrast was not administered.     FINDINGS:     ABDOMEN     RIGHT KIDNEY AND URETER:  No solid renal mass.  No detectable urothelial mass.  Small 1 cm cyst at the right midpole anteriorly. Additional subcentimeter hypodensities, too small to characterize but also likely tiny cysts.  No hydronephrosis or hydroureter.  No urinary tract calculi. No perinephric collection.     LEFT KIDNEY AND URETER:  No solid renal mass.  No detectable urothelial mass.  No hydronephrosis or hydroureter.  No urinary tract calculi. No perinephric collection.     URINARY BLADDER:  Underdistended limiting evaluation.  No definite masses.  No calculi.        LOWER CHEST: Bullous changes at the right lung base.     LIVER/BILIARY TREE:  One or more subcentimeter sharply circumscribed low-density hepatic lesion(s) are noted, too small to accurately characterize, but statistically most likely to represent subcentimeter hepatic cysts.  No suspicious solid hepatic   lesion is identified.  Hepatic contours are normal.  No biliary dilatation.     GALLBLADDER:  Gallbladder is surgically absent.     SPLEEN:  Unremarkable.     PANCREAS:  Unremarkable.     ADRENAL GLANDS:  Unremarkable.     STOMACH AND BOWEL:  There is colonic diverticulosis without evidence of acute diverticulitis.     APPENDIX:  No findings to suggest appendicitis.     ABDOMINOPELVIC CAVITY:  No ascites.  No free intraperitoneal air. No lymphadenopathy.     VESSELS:   Unremarkable for patient's age.     PELVIS     REPRODUCTIVE ORGANS: Prostate is moderately enlarged. Symmetric prominence of the bilateral seminal vesicle glands.     ABDOMINAL WALL/INGUINAL REGIONS:  There is a small fat-containing umbilical hernia.     OSSEOUS STRUCTURES:  No acute fracture or destructive osseous lesion.     IMPRESSION:     1.  No CT abnormality identified to account for hematuria.  2. Prostatomegaly.

## 2024-04-05 NOTE — TELEPHONE ENCOUNTER
Left a message for pt to call back to confirm procedure and also informed him of the pulmonary clearance approval

## 2024-04-09 ENCOUNTER — PATIENT OUTREACH (OUTPATIENT)
Dept: CASE MANAGEMENT | Facility: OTHER | Age: 69
End: 2024-04-09

## 2024-04-09 NOTE — PROGRESS NOTES
OP RT CM called and spoke with patient regarding their breathing, medications and O2.  Vern is doing well. He does not have any home oxygen or POX but does report when last chcked POX was 95-96%. Vern is recovering well from [place] valves  placed in [place]. HE does have excess mucus ( which can be normal after procedure) He will start Mucinex today  , if no response he will call me back to inquire about other options , possible vest. Mucus us clear, no fever or excess SOB noted.  Patient to have endo/ colon 4/26 and did get clearance for that. Pulmonary follow up due 10/24 . Schedule not out yet but patient will put reminder to  call in Sept for that.  He is scheduled to go back to pulmonary rehab on 4/23/24. He has attended prior so he did not have any questions about that.   He is taking Trelegy and is rinsing after use. He has rescue HFA and nebulizer but does not use as he does not like side effects he reports with either.   RT OP CM will do outreach in 1 month to follow up with patient. I did give him my contact info and instructed him to reach out prior with any questions.

## 2024-04-23 ENCOUNTER — CLINICAL SUPPORT (OUTPATIENT)
Dept: PULMONOLOGY | Age: 69
End: 2024-04-23
Payer: MEDICARE

## 2024-04-23 DIAGNOSIS — J44.9 COPD, VERY SEVERE (HCC): Primary | ICD-10-CM

## 2024-04-23 PROCEDURE — 94625 PHY/QHP OP PULM RHB W/O MNTR: CPT

## 2024-04-23 NOTE — PROGRESS NOTES
"Pulmonary Rehabilitation Assessment and Individualized Treatment Plan  INITIAL     Today's date: 2024  Patient name: Samuel Flores Jr.     : 1955       MRN: [id number]  PCP: Roverto Kebede DO  Referring Physician: Lorraine Olivarez DO  Pulmonologist: DO Hesham Cota MD     Provider: [place]  Clinician: Monse Merino MS, EP    Dx:    Encounter Diagnosis   Name Primary?    COPD, very severe (HCC)      Date of onset: 24- zephyr valves         ASSESSMENT      Weight:    Wt Readings from Last 1 Encounters:   24 60.9 kg (134 lb 3.2 oz)      Height:   Ht Readings from Last 1 Encounters:   24 5' 9\" (1.753 m)       Medical History:   Past Medical History:   Diagnosis Date    Abnormal chest x-ray with multiple lung nodules     last assessed: 2012    Anxiety     Arthritis     Bipolar 2 disorder, major depressive episode ([facility])     Chronic tension headaches     last assessed: 2012    COPD (chronic obstructive pulmonary disease) ([facility])     Depression 1980    Emphysema of lung ([facility]) 2017    Noticed from my first ct scan    GERD (gastroesophageal reflux disease)     Not sure of first occurance. Haven’t experienced any recent bouts.    Hypertension     Insomnia     Rheumatoid arthritis ([facility]) 10/01/1975    Scoliosis     Substance abuse ([facility]) 1970    Use of marajuana. Some experiments with cocain, crank and lsd.       Family History:  Family History   Problem Relation Age of Onset    Stroke Mother     Other Mother         epilepsy     Vision loss Mother     Prostate cancer Father     Dementia Father     Depression Brother     Bipolar disorder Brother     Heart disease Brother        Allergies:   Patient has no known allergies.    ETOH:   Social History     Substance and Sexual Activity   Alcohol Use Yes    Alcohol/week: 1.0 standard drink of alcohol    Types: 1 Glasses of wine per week    Comment: Not even 1glass of wine per week. Often " go for months on end       Current Medications:   Current Outpatient Medications   Medication Sig Dispense Refill    aspirin 81 mg chewable tablet Chew 81 mg daily      azithromycin (ZITHROMAX) 250 mg tablet Take 1 tablet (250 mg total) by mouth 3 (three) times a week Take 2 tablets today then 1 tablet daily x 4 days 24 tablet 3    benzonatate (TESSALON PERLES) 100 mg capsule Take 1 capsule (100 mg total) by mouth 3 (three) times a day 20 capsule 0    finasteride (PROSCAR) 5 mg tablet Take 1 tablet (5 mg total) by mouth daily for 360 doses 90 tablet 3    fluticasone-umeclidinium-vilanterol (Trelegy Ellipta) 200-62.5-25 mcg/actuation AEPB inhaler Inhale 1 puff daily Rinse mouth after use. 60 blister 11    losartan (COZAAR) 25 mg tablet Take 12.5 mg by mouth daily      oxyCODONE-acetaminophen (PERCOCET) 7.5-325 MG per tablet Take 1 tablet by mouth every 6 (six) hours as needed for moderate pain or severe pain      pantoprazole (PROTONIX) 40 mg tablet Take 1 tablet (40 mg total) by mouth daily 90 tablet 3    predniSONE 5 mg tablet Take 1 tablet (5 mg total) by mouth daily 90 tablet 3    tamsulosin (FLOMAX) 0.4 mg Take 2 capsules (0.8 mg total) by mouth daily at bedtime 90 capsule 3     No current facility-administered medications for this visit.       Physical Limitations: chronic back pain, taking medications for this     Fall Risk: Low   Comments: Ambulates with a steady gait with no assist device and Denies a fall in the past 6 months    Oxygen needs:   Rest:  room air  Exercise/physical activity:  room air  Sleep:   room air    Does Pt monitor home SpO2? no   Average SpO2 at rest:  %   Average SpO2 with ADLs/physical activity:  %  Stated he runs in the 90's    Use of Rescue Inhaler: No    Use of Maintenance Inhaler: Yes:  1 times per day    Use of Nebulizer Treatments:  No and stated these brought him to the hospital, more harm than good     Patient practices breathing techniques at home:  No    Pulmonary Disease  Risk Factors:  occupational exposure to workplace  dusts, chemicals, and fumes  second hand smoke  smoking      CAD Risk Factors:   Cholesterol: Yes  Smoking: Former user  HTN: No  DM: No  Obesity: No   Inactivity: Yes  Stress:  perceived  stress: 6/10   Stressors:currently going through divorce    Goals for Stress Management: practice Relaxation Techniques, exercise, keep a positive mindset, spend time outside, and enjoy a hobby      Current Functional Status:  Occupation: retired  Recreation:   ADL’s:No limitations  [place]: No limitations  Exercise: light walking   Home exercise equipment: N/A  Other: N/A    Nutrition:  Pt's reported dietary habits:  Patient eats a lot of fried foods daily/weekly, does not eat out often but fries most of his meats and foods at home. Does not eat many fruits or vegetables, likes to eat sweets     Patient Specific Goals:     EXERCISE GOALS (home exercise, ADLs):   Improve endurance, wants to be able to walk longer   NUTRITION GOALS (wt management, diabetes management, dietary modifications):   Reduce cholesterol through food, reduce fried foods    PSYCHOCOSOCIAL GOALS (stress, emotional well being, social support):   Ask for help when needed, going through a divorce currently, reduce stress   CORE COMPONENT GOALS (smoking, BP control, medication):   Monitor pulse ox at home with exertion      Ability to reach goals/rehabilitation potential:  Very Good     Projected return to function: 8-12 weeks  Objective tests: 6 MWT    Cultural needs: N/A    Comments:         INDIVIDUALIZED TREATMENT PLAN      SUMMARY OF PROGRESS:  Today is Vern Flores's initial evaluation to begin Pulmonary Rehab for the diagnosis of COPD very severe. Patient does have a PFT on file, revealing an FEV1/FVC ratio of 41% and an FEV1 of 33% predicted. This is suggestive of very severeobstruction. The patient has been experiencing increased dyspnea, increased sitting time, decreased physical activity, and  weakness.  They report dyspnea, weakness, and fatigue when completing ADLs . The patient currently does not follow a home exercise program. Patient does not do any exercise at home. Patient walk dog a few feet until he feels SOB and turn around and come back home.   Depression screening using the PHQ-9 interprets the patient's score of 10 10-14 = Moderate Depression. Anxiety screening using the ROSSI-7 interprets the patients score of 5  5-9 = Mild anxiety. When addressed, the patient reports having depression/anxiety. Patient reports excellent social/emotional support from children. Currently going through divorce, feels stressed/anxious.  Information to begin using [employer] was provided as well as contact information for counseling through  [facility].  PHQ-9 score will be reassessed in 30 days.The patient is a  former smoker recently quit a year ago and is abstaining well . He has abstained since quitting.Patient admits to 100% medication compliance.    At rest, the patient rated dyspnea 0/10 with SpO2 94% on room air.  They completed an initial 6MWT, walking 870 ft on room air. The patient’s rating of perceived dyspnea during the 6MWT was 3/10 with SpO2 91-93%.  Patient took 0 rest breaks. Telemetry revealed NSR, freq PACs.   Resting  /72 with appropriate hemodynamic response to exercise reaching 106/62.  Patient will exercise on room air. Group and individualized education will be provided on smoking cessation, oxygen use, breathing techniques, pulmonary anatomy, exercise for the pulmonary patient, healthy eating, stress, and relaxation.  Vern will attend 24 exercise sessions beginning 4/25/24.  Will progress patient as tolerated over the next 30 days.  See outlined plan of care below for specific patient goals in each component of care.         Medication compliance: Yes   Comments: Pt reports to be compliant with medications      Assessment of progression of lung disease and functional  status:  CAT: 26/40  Shortness of breath questionnaire: 75/120      EXERCISE ASSESSMENT and PLAN    Current Exercise Program in Rehab:       Frequency: 2 days/week   Supplement with home exercise 2+ days/wk as tolerated        Minutes: 20-40         METS: 1.5-2.2              SpO2: >88%              RPD: 4-6                      HR: 50-85% HRR or RHR +30-40bpm:    RPE: 4-5         Modalities: Treadmill, UBE, NuStep, and Recumbent bike      Exercise Progression 30 Day Goals :    Frequency: 2 days/week    Supplement with home exercise 2+ days/wk as tolerated       Minutes: 30-40        METS: 2.2-2.7              SpO2: >88%              RPD: 4-6                      HR: 30-40   RPE: 4-5        Modalities: Treadmill, Airdyne bike, UBE, and Lifecycle     Strength training:  Will be added following 2-3 weeks of monitored exercise sessions   Modalities: Leg Press, Chest Press, Pull Downs, Lateral Raise, and Arm Extension    Home Exercise: none    Education: pursed lipped breathing, diaphragmatic breathing, home exercise guidelines, benefits of exercise for pulmonary disease, RPE scale, and RPD scale    SMART Goals:   reduced score on CAT, improved 6MWT distance, and improved exercise tolerance based on peak METs tolerated in pulmonary rehab exercise session    Patient Specific EXERCISE GOALS:   (home exercise, ADLs): attend pulmonary rehab regularly, decrease sitting time at home, start a walking program, decreased rest needed with physical activity/exercise, increased muscular strength, and increased energy    Patient's progress toward SMART and personal goals: agreeable to attending rehab 2x/wk for 24 visits     Patient specific plan for next 30 days: improving endurance, would like to increase duration walking without feeling as SOB     Plan: Titrate supplemental oxygen as needed to maintain SpO2>88% with exercise, learn to conserve energy with ADLs , practice diaphragmatic breathing, reduce time sitting at home, and  "utilize PLB with physical activity    Readiness to change: Preparation:  (Getting ready to change)       NUTRITION ASSESSMENT AND PLAN    Weight control:    Starting weight: 135   Current weight:       Diabetes: N/A  A1c:     last measured:     SMART Goals:   CHOL <200, eat 6 or more servings of grain products per day, eat whole grain breads, brown rice and whole grain cereals, eat 5 or more servings of fruits and vegetables a day, choose lean beef or rarely eat beef, rarely eat processed meats or eat low fat processed meats, eat chicken and fish that is not fried, Do not cook with oil, butter or margarine, Do not eat fried foods, use \"light\" tub margarine on bread, potatoes and vegetables, choose healthy snacks such as fruit, pretzels, and low fat crackers, rarely/never eat salty snacks, and choose low sugar desserts and sweets      Patient Specific NUTRITION GOALS:  (wt control, diabetes management, dietary modifications): increase water intake to reduce/thin mucus production eat smaller, more frequent meals improve hydration    Patient's progress toward SMART and personal goals: understands he needs to make dietary changes to lower cholesterol levels     Patient specific plan for next 30 days: increase hydration (does not drink enough), naturally reduce cholesterol levels, take medication for cholesterol if doctor puts him on one     Plan: group class: Reading Food Labels, group Class: Heart Healthy Eating, replace refined flours with whole grains, increase daily intake of fruits and vegetables, choose lean red meat, eat chicken and fish that is baked, broiled or roasted, cook without fats or oils, never/rarely eat fried foods, use \"light\" tub margarine, rarely/never eat salty snacks, choose low sugar desserts and sweets, and drink less than 8oz of non-diet soda, punch etc. per day    Measurable goals were based Rate Your Plate Dietary Self-Assessment. These are the areas in which the patient could score higher " on the assessment.  Goals include recommendations for a heart healthy diet based on American Heart Association.    Education: heart healthy eating principles  low sodium diet  maintaining hydration  healthy choices while dining out  portion control  group class: Heart Healthy Eating  group class:  Label Reading  group class: healthy eating for managing pulmonary illness    Readiness to change: Preparation:  (Getting ready to change)       PSYCHOSOCIAL ASSESSMENT AND PLAN    Emotional:  Depression assessment:  PHQ-9 = 10  10-14 = Moderate Depression            Anxiety measure:  ROSSI-7 = 5  5-9 = Mild anxiety    Assessment of depression and anxiety    Patient reports feelings of depression   Patient reports feelings of anxiety    Self-reported stress level:  6  Stress Management: practice Relaxation Techniques, exercise, spend time outside, enjoy a hobby, and keep busy around the house    Patient's rating of Social support: Good    Social Support Network: children and friends    Psychosocial Assessment as it relates to rehabilitation: Patient denies issues with his/her family or home life that may affect their rehabilitation efforts.       SMART Goals:    Social Support in [place] Score < 3, Daily Activity in [place] Score < 3, Social Activities in [place] Score < 3, Pain in [place] Score < 3, Overall Health in [place] Score < 3, Quality of Life in [place] Score < 3 , feel less tired with more energy, control or stop worrying, and take time to relax    Patient Specific PSYCHOCOSOCIAL GOALS:  (stress, emotional well being, social support): spend time with family and consider talking to a therapist    Patient's progress toward SMART and personal goals: at this time he feels very stressed going through a divorce, but typically does not have any stress     Patient specific plan for next 30 days: consider talking to a therapist, asking for help from children    Plan: Class:  Stress and Pulmonary Disease, Practice  relaxation techniques, Exercise, Spend time outdoors, Keep a positive mindset, and Enjoy family    Education: signs/sxs of depression, benefits of a positive support system, stress management techniques, and class:  Stress and Pulmonary Disease    Readiness to change: Preparation:  (Getting ready to change)       OTHER CORE COMPONENTS     Tobacco:   Social History     Tobacco Use   Smoking Status Former    Current packs/day: 0.00    Average packs/day: 1 pack/day for 54.0 years (54.0 ttl pk-yrs)    Types: Cigarettes    Start date: 1969    Quit date: 2023    Years since quittin.6   Smokeless Tobacco Never       Tobacco Use Intervention: Referral to tobacco expert:   N/A: Pt has a remote history of smoking    Blood pressure:    Restin/72   Exercise: 106/62    Oxygen Goal: Maintain SpO2>88% during exercise or as advised by pulmonolgist    SMART Goals: reduced dietary sodium <2000mg, medication compliance, Abstain from smoking, and monitor home pulse oximetry    Patient Specific CORE COMPONENT GOALS (smoking, BP control, angina control, medication compliance): reduced dietary sodium <2000mg, medication compliance, monitor home pulse oximetry, and avoid fried foods    Patient's progress toward SMART and personal goals: understands lifestyle changes need to be made     Patient specific plan for next 30 days: monitor pulse ox at home with exertion, reduce fried foods, [facility]     Plan: avoid places with second hand smoke, avoid processed foods, engage in regular exercise, eliminate salt shaker at the table, use salt substitutes, group class: Pulmonary Anatomy and Physiology, and group class:  Pulmonary Medications    Education:  control coughing, inspiratory muscle training, environmental triggers, traveling with pulmonary disease, education: Pulmonary Anatomy and Physiology, education:  Pulmonary Medications, and education:  Clearing Secretions    Readiness to  change: Preparation:  (Getting ready to change)

## 2024-04-25 ENCOUNTER — APPOINTMENT (OUTPATIENT)
Dept: PULMONOLOGY | Age: 69
End: 2024-04-25
Payer: MEDICARE

## 2024-04-25 ENCOUNTER — TELEPHONE (OUTPATIENT)
Dept: GASTROENTEROLOGY | Facility: HOSPITAL | Age: 69
End: 2024-04-25

## 2024-04-25 DIAGNOSIS — K21.00 GASTROESOPHAGEAL REFLUX DISEASE WITH ESOPHAGITIS, UNSPECIFIED WHETHER HEMORRHAGE: ICD-10-CM

## 2024-04-25 RX ORDER — PANTOPRAZOLE SODIUM 40 MG/1
40 TABLET, DELAYED RELEASE ORAL DAILY
Qty: 90 TABLET | Refills: 1 | Status: SHIPPED | OUTPATIENT
Start: 2024-04-25

## 2024-04-25 NOTE — TELEPHONE ENCOUNTER
Patient said Dr Archer increased him to take 1 tablet twice daily, patient needs prescription with updated directions  Reason for call:   [x] Refill   [] Prior Auth  [] Other:     Office:   [] PCP/Provider -   [x] Specialty/Provider - pulm    Medication: Pantoprazole 40mg    Dose/Frequency: 1 tab BID    Quantity: 180    Pharmacy: [facility]/pharmacy #[id number] - [place] - [id number] [phone]     Does the patient have enough for 3 days?   [x] Yes   [] No - Send as HP to POD

## 2024-04-25 NOTE — TELEPHONE ENCOUNTER
Pt rescheduled his procedure to 7/16/24. pt ate. didnt realize it was a 2 day clear liquid diet. Notified Cayden of cancellation.

## 2024-04-30 ENCOUNTER — CLINICAL SUPPORT (OUTPATIENT)
Dept: PULMONOLOGY | Age: 69
End: 2024-04-30
Payer: MEDICARE

## 2024-04-30 DIAGNOSIS — J44.9 COPD, VERY SEVERE (HCC): Primary | ICD-10-CM

## 2024-04-30 PROCEDURE — 94625 PHY/QHP OP PULM RHB W/O MNTR: CPT

## 2024-05-02 ENCOUNTER — CLINICAL SUPPORT (OUTPATIENT)
Dept: PULMONOLOGY | Age: 69
End: 2024-05-02
Payer: MEDICARE

## 2024-05-02 DIAGNOSIS — J44.9 COPD, VERY SEVERE (HCC): Primary | ICD-10-CM

## 2024-05-02 PROCEDURE — 94625 PHY/QHP OP PULM RHB W/O MNTR: CPT

## 2024-05-07 ENCOUNTER — CLINICAL SUPPORT (OUTPATIENT)
Dept: PULMONOLOGY | Age: 69
End: 2024-05-07
Payer: MEDICARE

## 2024-05-07 DIAGNOSIS — J44.9 COPD, VERY SEVERE (HCC): Primary | ICD-10-CM

## 2024-05-07 PROCEDURE — 94625 PHY/QHP OP PULM RHB W/O MNTR: CPT

## 2024-05-08 ENCOUNTER — PATIENT OUTREACH (OUTPATIENT)
Dept: CASE MANAGEMENT | Facility: OTHER | Age: 69
End: 2024-05-08

## 2024-05-08 NOTE — PROGRESS NOTES
.OP RT CM called and left a  requesting a return phone call. I will outreach in two weeks unless I hear back from patient prior. __________________  OP RT CM received incoming call from Vern.  He is doing well. He is enjoying pulmonary rehab and now since  having the Zepher valve procedure he is now able to climb the stairs at once. Previously he needed to stop three times to climb the stairs. We discussed pulmonary rehab and the importance of continued exercise once rehab is completed.   Vern is raising white sputum and has a daily cough. We discussed flutter valve and the importance of raising mucus easily. Vern is using Trelegy Qday and rinsing out mouth.  No maria d for Albuterol inhaler. Vern is very sensitive to the aerosol with a nebulizer and cannot use.   No further outreach is needed with OP RT CM . Vern has my contact information and is grateful for outreach.

## 2024-05-09 ENCOUNTER — APPOINTMENT (OUTPATIENT)
Dept: PULMONOLOGY | Age: 69
End: 2024-05-09
Payer: MEDICARE

## 2024-05-12 DIAGNOSIS — R35.1 BENIGN PROSTATIC HYPERPLASIA WITH NOCTURIA: ICD-10-CM

## 2024-05-12 DIAGNOSIS — N40.1 BENIGN PROSTATIC HYPERPLASIA WITH NOCTURIA: ICD-10-CM

## 2024-05-14 ENCOUNTER — CLINICAL SUPPORT (OUTPATIENT)
Dept: PULMONOLOGY | Age: 69
End: 2024-05-14
Payer: MEDICARE

## 2024-05-14 DIAGNOSIS — J44.9 COPD, VERY SEVERE (HCC): Primary | ICD-10-CM

## 2024-05-14 PROCEDURE — 94625 PHY/QHP OP PULM RHB W/O MNTR: CPT

## 2024-05-14 RX ORDER — TAMSULOSIN HYDROCHLORIDE 0.4 MG/1
0.8 CAPSULE ORAL
Qty: 180 CAPSULE | Refills: 1 | Status: SHIPPED | OUTPATIENT
Start: 2024-05-14

## 2024-05-16 ENCOUNTER — CLINICAL SUPPORT (OUTPATIENT)
Dept: PULMONOLOGY | Age: 69
End: 2024-05-16
Payer: MEDICARE

## 2024-05-16 DIAGNOSIS — J44.9 COPD, VERY SEVERE (HCC): Primary | ICD-10-CM

## 2024-05-16 PROCEDURE — 94625 PHY/QHP OP PULM RHB W/O MNTR: CPT

## 2024-05-21 ENCOUNTER — CLINICAL SUPPORT (OUTPATIENT)
Dept: PULMONOLOGY | Age: 69
End: 2024-05-21
Payer: MEDICARE

## 2024-05-21 DIAGNOSIS — J44.9 COPD, VERY SEVERE (HCC): Primary | ICD-10-CM

## 2024-05-21 PROCEDURE — 94625 PHY/QHP OP PULM RHB W/O MNTR: CPT

## 2024-05-21 NOTE — PROGRESS NOTES
"Pulmonary Rehabilitation Assessment and Individualized Treatment Plan  30 DAY    Today's date: May 21, 2024  Patient name: Samuel Flores Jr.     : 1955       MRN: [id number]  PCP: Roverto Kebede DO  Referring Physician: Lorraine Olivarez DO  Pulmonologist: DO Hesham Cota MD     Provider: [place]  Clinician: Monse Merino MS, EP    SESSIONS COMPLETED: 7    Dx:    Encounter Diagnosis   Name Primary?    COPD, very severe (HCC) Yes     Date of onset: 24- zephyr valves         ASSESSMENT      Weight:    Wt Readings from Last 1 Encounters:   24 60.9 kg (134 lb 3.2 oz)      Height:   Ht Readings from Last 1 Encounters:   24 5' 9\" (1.753 m)       Medical History:   Past Medical History:   Diagnosis Date    Abnormal chest x-ray with multiple lung nodules     last assessed: 2012    Anxiety     Arthritis     Bipolar 2 disorder, major depressive episode ([facility])     Chronic tension headaches     last assessed: 2012    COPD (chronic obstructive pulmonary disease) ([facility])     Depression 1980    Emphysema of lung ([facility]) 2017    Noticed from my first ct scan    GERD (gastroesophageal reflux disease)     Not sure of first occurance. Haven’t experienced any recent bouts.    Hypertension     Insomnia     Rheumatoid arthritis ([facility]) 10/01/1975    Scoliosis     Substance abuse ([facility]) 1970    Use of marajuana. Some experiments with cocain, crank and lsd.       Family History:  Family History   Problem Relation Age of Onset    Stroke Mother     Other Mother         epilepsy     Vision loss Mother     Prostate cancer Father     Dementia Father     Depression Brother     Bipolar disorder Brother     Heart disease Brother        Allergies:   Patient has no known allergies.    ETOH:   Social History     Substance and Sexual Activity   Alcohol Use Yes    Alcohol/week: 1.0 standard drink of alcohol    Types: 1 Glasses of wine per week    Comment: Not even 1glass " of wine per week. Often go for months on end       Current Medications:   Current Outpatient Medications   Medication Sig Dispense Refill    aspirin 81 mg chewable tablet Chew 81 mg daily      azithromycin (ZITHROMAX) 250 mg tablet Take 1 tablet (250 mg total) by mouth 3 (three) times a week Take 2 tablets today then 1 tablet daily x 4 days 24 tablet 3    benzonatate (TESSALON PERLES) 100 mg capsule Take 1 capsule (100 mg total) by mouth 3 (three) times a day 20 capsule 0    finasteride (PROSCAR) 5 mg tablet Take 1 tablet (5 mg total) by mouth daily for 360 doses 90 tablet 3    fluticasone-umeclidinium-vilanterol (Trelegy Ellipta) 200-62.5-25 mcg/actuation AEPB inhaler Inhale 1 puff daily Rinse mouth after use. 60 blister 11    losartan (COZAAR) 25 mg tablet Take 12.5 mg by mouth daily      oxyCODONE-acetaminophen (PERCOCET) 7.5-325 MG per tablet Take 1 tablet by mouth every 6 (six) hours as needed for moderate pain or severe pain      pantoprazole (PROTONIX) 40 mg tablet Take 1 tablet (40 mg total) by mouth daily 90 tablet 1    predniSONE 5 mg tablet Take 1 tablet (5 mg total) by mouth daily 90 tablet 3    tamsulosin (FLOMAX) 0.4 mg TAKE 2 CAPSULES (0.8 MG TOTAL) BY MOUTH DAILY AT BEDTIME 180 capsule 1     No current facility-administered medications for this visit.       Physical Limitations: chronic back pain, taking medications for this     Fall Risk: Low   Comments: Ambulates with a steady gait with no assist device and Denies a fall in the past 6 months    Oxygen needs:   Rest:  room air  Exercise/physical activity:  room air  Sleep:   room air    Does Pt monitor home SpO2? no   Average SpO2 at rest:  %   Average SpO2 with ADLs/physical activity:  %  Stated he runs in the 90's    Use of Rescue Inhaler: No    Use of Maintenance Inhaler: Yes:  1 times per day    Use of Nebulizer Treatments:  No and stated these brought him to the hospital, more harm than good     Patient practices breathing techniques at home:   No    Pulmonary Disease Risk Factors:  occupational exposure to workplace  dusts, chemicals, and fumes  second hand smoke  smoking      CAD Risk Factors:   Cholesterol: Yes  Smoking: Former user  HTN: No  DM: No  Obesity: No   Inactivity: Yes  Stress:  perceived  stress: 6/10   Stressors:currently going through divorce    Goals for Stress Management: practice Relaxation Techniques, exercise, keep a positive mindset, spend time outside, and enjoy a hobby      Current Functional Status:  Occupation: retired  Recreation:   ADL’s:No limitations  [place]: No limitations  Exercise: light walking   Home exercise equipment: N/A  Other: N/A    Nutrition:  Pt's reported dietary habits:  Patient eats a lot of fried foods daily/weekly, does not eat out often but fries most of his meats and foods at home. Does not eat many fruits or vegetables, likes to eat sweets     Patient Specific Goals:     EXERCISE GOALS (home exercise, ADLs):   Improve endurance, wants to be able to walk longer   NUTRITION GOALS (wt management, diabetes management, dietary modifications):   Reduce cholesterol through food, reduce fried foods    PSYCHOCOSOCIAL GOALS (stress, emotional well being, social support):   Ask for help when needed, going through a divorce currently, reduce stress   CORE COMPONENT GOALS (smoking, BP control, medication):   Monitor pulse ox at home with exertion      Ability to reach goals/rehabilitation potential:  Very Good     Projected return to function: 8-12 weeks  Objective tests: 6 MWT    Cultural needs: N/A    Comments:         INDIVIDUALIZED TREATMENT PLAN    5/21: Vern Flores has attended 7 exercise sessions in the past 30 days.   He tolerates 30-33 mins at 2.5 - 3.8 METs.  A light strength training component has not been added to their exercise program. and will be added in a future exercise session..   He is tolerating progression of intensity levels to maintain RPE 4-6.   Resting BP  102/64 - 120/64 with Normal  response to exercise reaching 114/68- 116/64. RHR 76 - 83  with Normal response to exercise reaching 97 - 104. Patient is exercising on room air at rehab and is not using oxygen at home. Patient is okay with using inhalers at home when needed. Patient attends group educational classes on risk factor modification.   His exercise program will be progressed as tolerated to maintain RPE 4-6.  They will continue to be educated on lifestyle modification and encouraged to supplement with a home exercise program as tolerated.    SUMMARY OF PROGRESS:  Today is Vern Flores's initial evaluation to begin Pulmonary Rehab for the diagnosis of COPD very severe. Patient does have a PFT on file, revealing an FEV1/FVC ratio of 41% and an FEV1 of 33% predicted. This is suggestive of very severeobstruction. The patient has been experiencing increased dyspnea, increased sitting time, decreased physical activity, and weakness.  They report dyspnea, weakness, and fatigue when completing ADLs . The patient currently does not follow a home exercise program. Patient does not do any exercise at home. Patient walk dog a few feet until he feels SOB and turn around and come back home.   Depression screening using the PHQ-9 interprets the patient's score of 10 10-14 = Moderate Depression. Anxiety screening using the ROSSI-7 interprets the patients score of 5  5-9 = Mild anxiety. When addressed, the patient reports having depression/anxiety. Patient reports excellent social/emotional support from children. Currently going through divorce, feels stressed/anxious.  Information to begin using [employer] was provided as well as contact information for counseling through [facility].  PHQ-9 score will be reassessed in 30 days.The patient is a  former smoker recently quit a year ago and is abstaining well . He has abstained since quitting.Patient admits to 100% medication compliance.    At rest, the patient rated dyspnea 0/10 with SpO2 94% on  room air.  They completed an initial 6MWT, walking 870 ft on room air. The patient’s rating of perceived dyspnea during the 6MWT was 3/10 with SpO2 91-93%.  Patient took 0 rest breaks. Telemetry revealed NSR, freq PACs.   Resting  /72 with appropriate hemodynamic response to exercise reaching 106/62.  Patient will exercise on room air. Group and individualized education will be provided on smoking cessation, oxygen use, breathing techniques, pulmonary anatomy, exercise for the pulmonary patient, healthy eating, stress, and relaxation.  Vern will attend 24 exercise sessions beginning 4/25/24.  Will progress patient as tolerated over the next 30 days.  See outlined plan of care below for specific patient goals in each component of care.         Medication compliance: Yes   Comments: Pt reports to be compliant with medications      Assessment of progression of lung disease and functional status:  CAT: 26/40  Shortness of breath questionnaire: 75/120      EXERCISE ASSESSMENT and PLAN    Current Exercise Program in Rehab:       Frequency: 2 days/week   Supplement with home exercise 2+ days/wk as tolerated        Minutes: 20-40         METS: 2.5-3.8              SpO2: >88%              RPD: 4-6                      HR: 50-85% HRR or RHR +30-40bpm:    RPE: 4-5         Modalities: Treadmill, UBE, NuStep, and Recumbent bike      Exercise Progression 30 Day Goals :    Frequency: 2 days/week    Supplement with home exercise 2+ days/wk as tolerated       Minutes: 30-40        METS: 3.0-3.5              SpO2: >88%              RPD: 4-6                      HR: 30-40   RPE: 4-5        Modalities: Treadmill, UBE, Lifecycle, NuStep, and Recumbent bike     Strength training:  Will be added following 2-3 weeks of monitored exercise sessions   Modalities: Leg Press, Chest Press, Pull Downs, Lateral Raise, and Arm Extension    Home Exercise: none, doing little 'projects' around the house, no physical exercise     Education: pursed  "lipped breathing, diaphragmatic breathing, home exercise guidelines, benefits of exercise for pulmonary disease, RPE scale, and RPD scale    SMART Goals:   reduced score on CAT, improved 6MWT distance, and improved exercise tolerance based on peak METs tolerated in pulmonary rehab exercise session    Patient Specific EXERCISE GOALS:   (home exercise, ADLs): attend pulmonary rehab regularly, decrease sitting time at home, start a walking program, decreased rest needed with physical activity/exercise, increased muscular strength, and increased energy    Patient's progress toward SMART and personal goals: agreeable to attending rehab 2x/wk for 24 visits   Patient has not started exercise at home outside of rehab, patient doing little walking around the house, completing projects, taking many breaks while completing these    Patient specific plan for next 30 days: improving endurance, would like to increase duration walking without feeling as SOB     Plan: Titrate supplemental oxygen as needed to maintain SpO2>88% with exercise, learn to conserve energy with ADLs , practice diaphragmatic breathing, reduce time sitting at home, and utilize PLB with physical activity    Readiness to change: Preparation:  (Getting ready to change)  and Action:  (Changing behavior)      NUTRITION ASSESSMENT AND PLAN    Weight control:    Starting weight: 135   Current weight:   144    Diabetes: N/A  A1c:     last measured:     SMART Goals:   CHOL <200, eat 6 or more servings of grain products per day, eat whole grain breads, brown rice and whole grain cereals, eat 5 or more servings of fruits and vegetables a day, choose lean beef or rarely eat beef, rarely eat processed meats or eat low fat processed meats, eat chicken and fish that is not fried, Do not cook with oil, butter or margarine, Do not eat fried foods, use \"light\" tub margarine on bread, potatoes and vegetables, choose healthy snacks such as fruit, pretzels, and low fat crackers, " "rarely/never eat salty snacks, and choose low sugar desserts and sweets      Patient Specific NUTRITION GOALS:  (wt control, diabetes management, dietary modifications): increase water intake to reduce/thin mucus production eat smaller, more frequent meals improve hydration    Patient's progress toward SMART and personal goals: understands he needs to make dietary changes to lower cholesterol levels   Patient continues to eat what is available around the house, continues to eat fried foods, encouraged to drink more water (does not like drinking it)    Patient specific plan for next 30 days: increase hydration (does not drink enough), naturally reduce cholesterol levels, take medication for cholesterol if doctor puts him on one     Plan: group class: Reading Food Labels, group Class: Heart Healthy Eating, replace refined flours with whole grains, increase daily intake of fruits and vegetables, choose lean red meat, eat chicken and fish that is baked, broiled or roasted, cook without fats or oils, never/rarely eat fried foods, use \"light\" tub margarine, rarely/never eat salty snacks, choose low sugar desserts and sweets, and drink less than 8oz of non-diet soda, punch etc. per day    Measurable goals were based Rate Your Plate Dietary Self-Assessment. These are the areas in which the patient could score higher on the assessment.  Goals include recommendations for a heart healthy diet based on American Heart Association.    Education: heart healthy eating principles  low sodium diet  maintaining hydration  healthy choices while dining out  portion control  group class: Heart Healthy Eating  group class:  Label Reading  group class: healthy eating for managing pulmonary illness    Readiness to change: Preparation:  (Getting ready to change)  and Action:  (Changing behavior)      PSYCHOSOCIAL ASSESSMENT AND PLAN    Emotional:  Depression assessment:  PHQ-9 = 10  10-14 = Moderate Depression            Anxiety measure:  " ROSSI-7 = 5  5-9 = Mild anxiety    Assessment of depression and anxiety    Patient reports feelings of depression   Patient reports feelings of anxiety    Self-reported stress level:  6  Stress Management: practice Relaxation Techniques, exercise, spend time outside, enjoy a hobby, and keep busy around the house    Patient's rating of Social support: Good    Social Support Network: children and friends    Psychosocial Assessment as it relates to rehabilitation: Patient denies issues with his/her family or home life that may affect their rehabilitation efforts.       SMART Goals:    Social Support in [place] Score < 3, Daily Activity in [place] Score < 3, Social Activities in [place] Score < 3, Pain in [place] Score < 3, Overall Health in [place] Score < 3, Quality of Life in [place] Score < 3 , feel less tired with more energy, control or stop worrying, and take time to relax    Patient Specific PSYCHOCOSOCIAL GOALS:  (stress, emotional well being, social support): spend time with family and consider talking to a therapist    Patient's progress toward SMART and personal goals: at this time he feels very stressed going through a divorce, but typically does not have any stress   Patient is feeling very stressed at this time with current relationship, going through divorce that has him feeling depressed    Patient specific plan for next 30 days: consider talking to a therapist, asking for help from children    Plan: Class:  Stress and Pulmonary Disease, Practice relaxation techniques, Exercise, Spend time outdoors, Keep a positive mindset, and Enjoy family    Education: signs/sxs of depression, benefits of a positive support system, stress management techniques, and class:  Stress and Pulmonary Disease    Readiness to change: Preparation:  (Getting ready to change)       OTHER CORE COMPONENTS     Tobacco:   Social History     Tobacco Use   Smoking Status Former    Current packs/day: 0.00    Average packs/day:  1 pack/day for 54.0 years (54.0 ttl pk-yrs)    Types: Cigarettes    Start date: 1969    Quit date: 2023    Years since quittin.7   Smokeless Tobacco Never       Tobacco Use Intervention: Referral to tobacco expert:   N/A: Pt has a remote history of smoking    Blood pressure:    Restin//64   Exercise: 114//64    Oxygen Goal: Maintain SpO2>88% during exercise or as advised by pulmonolgist    SMART Goals: reduced dietary sodium <2000mg, medication compliance, Abstain from smoking, and monitor home pulse oximetry    Patient Specific CORE COMPONENT GOALS (smoking, BP control, angina control, medication compliance): reduced dietary sodium <2000mg, medication compliance, monitor home pulse oximetry, and avoid fried foods    Patient's progress toward SMART and personal goals: understands lifestyle changes need to be made     Patient specific plan for next 30 days: monitor pulse ox at home with exertion, reduce fried foods, [facility]     Plan: avoid places with second hand smoke, avoid processed foods, engage in regular exercise, eliminate salt shaker at the table, use salt substitutes, group class: Pulmonary Anatomy and Physiology, and group class:  Pulmonary Medications    Education:  control coughing, inspiratory muscle training, environmental triggers, traveling with pulmonary disease, education: Pulmonary Anatomy and Physiology, education:  Pulmonary Medications, and education:  Clearing Secretions    Readiness to change: Preparation:  (Getting ready to change)  and Action:  (Changing behavior)

## 2024-05-23 ENCOUNTER — HOSPITAL ENCOUNTER (EMERGENCY)
Facility: HOSPITAL | Age: 69
Discharge: HOME | End: 2024-05-23
Attending: EMERGENCY MEDICINE
Payer: MEDICARE

## 2024-05-23 ENCOUNTER — CLINICAL SUPPORT (OUTPATIENT)
Dept: PULMONOLOGY | Age: 69
End: 2024-05-23
Payer: MEDICARE

## 2024-05-23 ENCOUNTER — APPOINTMENT (EMERGENCY)
Dept: RADIOLOGY | Facility: HOSPITAL | Age: 69
End: 2024-05-23
Payer: MEDICARE

## 2024-05-23 ENCOUNTER — OFFICE VISIT (OUTPATIENT)
Dept: URGENT CARE | Age: 69
End: 2024-05-23
Payer: MEDICARE

## 2024-05-23 VITALS
DIASTOLIC BLOOD PRESSURE: 87 MMHG | HEART RATE: 59 BPM | TEMPERATURE: 98.1 F | SYSTOLIC BLOOD PRESSURE: 141 MMHG | OXYGEN SATURATION: 96 % | RESPIRATION RATE: 17 BRPM

## 2024-05-23 VITALS
SYSTOLIC BLOOD PRESSURE: 159 MMHG | RESPIRATION RATE: 24 BRPM | DIASTOLIC BLOOD PRESSURE: 83 MMHG | TEMPERATURE: 96.8 F | HEART RATE: 87 BPM | OXYGEN SATURATION: 99 %

## 2024-05-23 DIAGNOSIS — K21.9 GERD (GASTROESOPHAGEAL REFLUX DISEASE): Primary | ICD-10-CM

## 2024-05-23 DIAGNOSIS — R42 DIZZINESS: ICD-10-CM

## 2024-05-23 DIAGNOSIS — R07.9 CHEST PAIN, UNSPECIFIED TYPE: Primary | ICD-10-CM

## 2024-05-23 DIAGNOSIS — R06.02 SHORTNESS OF BREATH: ICD-10-CM

## 2024-05-23 DIAGNOSIS — J44.9 COPD, VERY SEVERE (HCC): Primary | ICD-10-CM

## 2024-05-23 LAB
2HR DELTA HS TROPONIN: -1 NG/L
ALBUMIN SERPL BCP-MCNC: 4.1 G/DL (ref 3.5–5)
ALP SERPL-CCNC: 61 U/L (ref 34–104)
ALT SERPL W P-5'-P-CCNC: 12 U/L (ref 7–52)
ANION GAP SERPL CALCULATED.3IONS-SCNC: 6 MMOL/L (ref 4–13)
APTT PPP: 28 SECONDS (ref 23–37)
AST SERPL W P-5'-P-CCNC: 13 U/L (ref 13–39)
ATRIAL RATE: 61 BPM
ATRIAL RATE: 82 BPM
BASOPHILS # BLD AUTO: 0.04 THOUSANDS/ÂΜL (ref 0–0.1)
BASOPHILS NFR BLD AUTO: 1 % (ref 0–1)
BILIRUB SERPL-MCNC: 0.35 MG/DL (ref 0.2–1)
BILIRUB UR QL STRIP: NEGATIVE
BNP SERPL-MCNC: 17 PG/ML (ref 0–100)
BUN SERPL-MCNC: 17 MG/DL (ref 5–25)
CALCIUM SERPL-MCNC: 9.4 MG/DL (ref 8.4–10.2)
CARDIAC TROPONIN I PNL SERPL HS: 3 NG/L
CARDIAC TROPONIN I PNL SERPL HS: 4 NG/L
CHLORIDE SERPL-SCNC: 102 MMOL/L (ref 96–108)
CLARITY UR: CLEAR
CO2 SERPL-SCNC: 28 MMOL/L (ref 21–32)
COLOR UR: NORMAL
CREAT SERPL-MCNC: 0.88 MG/DL (ref 0.6–1.3)
EOSINOPHIL # BLD AUTO: 0.03 THOUSAND/ÂΜL (ref 0–0.61)
EOSINOPHIL NFR BLD AUTO: 1 % (ref 0–6)
ERYTHROCYTE [DISTWIDTH] IN BLOOD BY AUTOMATED COUNT: 12.6 % (ref 11.6–15.1)
GFR SERPL CREATININE-BSD FRML MDRD: 87 ML/MIN/1.73SQ M
GLUCOSE SERPL-MCNC: 102 MG/DL (ref 65–140)
GLUCOSE UR STRIP-MCNC: NEGATIVE MG/DL
HCT VFR BLD AUTO: 43.2 % (ref 36.5–49.3)
HGB BLD-MCNC: 14 G/DL (ref 12–17)
HGB UR QL STRIP.AUTO: NEGATIVE
IMM GRANULOCYTES # BLD AUTO: 0.03 THOUSAND/UL (ref 0–0.2)
IMM GRANULOCYTES NFR BLD AUTO: 1 % (ref 0–2)
INR PPP: 1.09 (ref 0.84–1.19)
KETONES UR STRIP-MCNC: NEGATIVE MG/DL
LEUKOCYTE ESTERASE UR QL STRIP: NEGATIVE
LYMPHOCYTES # BLD AUTO: 0.79 THOUSANDS/ÂΜL (ref 0.6–4.47)
LYMPHOCYTES NFR BLD AUTO: 12 % (ref 14–44)
MCH RBC QN AUTO: 30.4 PG (ref 26.8–34.3)
MCHC RBC AUTO-ENTMCNC: 32.4 G/DL (ref 31.4–37.4)
MCV RBC AUTO: 94 FL (ref 82–98)
MONOCYTES # BLD AUTO: 0.32 THOUSAND/ÂΜL (ref 0.17–1.22)
MONOCYTES NFR BLD AUTO: 5 % (ref 4–12)
NEUTROPHILS # BLD AUTO: 5.33 THOUSANDS/ÂΜL (ref 1.85–7.62)
NEUTS SEG NFR BLD AUTO: 80 % (ref 43–75)
NITRITE UR QL STRIP: NEGATIVE
NRBC BLD AUTO-RTO: 0 /100 WBCS
P AXIS: 82 DEGREES
P AXIS: 87 DEGREES
PH UR STRIP.AUTO: 6.5 [PH]
PLATELET # BLD AUTO: 300 THOUSANDS/UL (ref 149–390)
PMV BLD AUTO: 9 FL (ref 8.9–12.7)
POTASSIUM SERPL-SCNC: 4.3 MMOL/L (ref 3.5–5.3)
PR INTERVAL: 154 MS
PR INTERVAL: 158 MS
PROT SERPL-MCNC: 6.8 G/DL (ref 6.4–8.4)
PROT UR STRIP-MCNC: NEGATIVE MG/DL
PROTHROMBIN TIME: 14 SECONDS (ref 11.6–14.5)
QRS AXIS: 14 DEGREES
QRS AXIS: 52 DEGREES
QRSD INTERVAL: 92 MS
QRSD INTERVAL: 98 MS
QT INTERVAL: 366 MS
QT INTERVAL: 394 MS
QTC INTERVAL: 396 MS
QTC INTERVAL: 427 MS
RBC # BLD AUTO: 4.6 MILLION/UL (ref 3.88–5.62)
SODIUM SERPL-SCNC: 136 MMOL/L (ref 135–147)
SP GR UR STRIP.AUTO: 1.01 (ref 1–1.03)
T WAVE AXIS: 64 DEGREES
T WAVE AXIS: 72 DEGREES
UROBILINOGEN UR STRIP-ACNC: <2 MG/DL
VENTRICULAR RATE: 61 BPM
VENTRICULAR RATE: 82 BPM
WBC # BLD AUTO: 6.54 THOUSAND/UL (ref 4.31–10.16)

## 2024-05-23 PROCEDURE — 99285 EMERGENCY DEPT VISIT HI MDM: CPT | Performed by: PHYSICIAN ASSISTANT

## 2024-05-23 PROCEDURE — 99213 OFFICE O/P EST LOW 20 MIN: CPT | Performed by: NURSE PRACTITIONER

## 2024-05-23 PROCEDURE — 85730 THROMBOPLASTIN TIME PARTIAL: CPT | Performed by: PHYSICIAN ASSISTANT

## 2024-05-23 PROCEDURE — 71046 X-RAY EXAM CHEST 2 VIEWS: CPT

## 2024-05-23 PROCEDURE — 93005 ELECTROCARDIOGRAM TRACING: CPT

## 2024-05-23 PROCEDURE — 85025 COMPLETE CBC W/AUTO DIFF WBC: CPT | Performed by: PHYSICIAN ASSISTANT

## 2024-05-23 PROCEDURE — 99285 EMERGENCY DEPT VISIT HI MDM: CPT

## 2024-05-23 PROCEDURE — 84484 ASSAY OF TROPONIN QUANT: CPT | Performed by: PHYSICIAN ASSISTANT

## 2024-05-23 PROCEDURE — 36415 COLL VENOUS BLD VENIPUNCTURE: CPT | Performed by: PHYSICIAN ASSISTANT

## 2024-05-23 PROCEDURE — 85610 PROTHROMBIN TIME: CPT | Performed by: PHYSICIAN ASSISTANT

## 2024-05-23 PROCEDURE — 80053 COMPREHEN METABOLIC PANEL: CPT | Performed by: PHYSICIAN ASSISTANT

## 2024-05-23 PROCEDURE — 93010 ELECTROCARDIOGRAM REPORT: CPT | Performed by: INTERNAL MEDICINE

## 2024-05-23 PROCEDURE — G0463 HOSPITAL OUTPT CLINIC VISIT: HCPCS | Performed by: NURSE PRACTITIONER

## 2024-05-23 PROCEDURE — 81003 URINALYSIS AUTO W/O SCOPE: CPT | Performed by: PHYSICIAN ASSISTANT

## 2024-05-23 PROCEDURE — 94625 PHY/QHP OP PULM RHB W/O MNTR: CPT

## 2024-05-23 PROCEDURE — 83880 ASSAY OF NATRIURETIC PEPTIDE: CPT | Performed by: PHYSICIAN ASSISTANT

## 2024-05-23 RX ORDER — MAGNESIUM HYDROXIDE/ALUMINUM HYDROXICE/SIMETHICONE 120; 1200; 1200 MG/30ML; MG/30ML; MG/30ML
30 SUSPENSION ORAL ONCE
Status: COMPLETED | OUTPATIENT
Start: 2024-05-23 | End: 2024-05-23

## 2024-05-23 RX ORDER — LIDOCAINE HYDROCHLORIDE 20 MG/ML
15 SOLUTION OROPHARYNGEAL ONCE
Status: COMPLETED | OUTPATIENT
Start: 2024-05-23 | End: 2024-05-23

## 2024-05-23 RX ORDER — OXYCODONE HYDROCHLORIDE 5 MG/1
5 TABLET ORAL ONCE
Status: COMPLETED | OUTPATIENT
Start: 2024-05-23 | End: 2024-05-23

## 2024-05-23 RX ORDER — SUCRALFATE ORAL 1 G/10ML
1 SUSPENSION ORAL 4 TIMES DAILY
Qty: 400 ML | Refills: 0 | Status: SHIPPED | OUTPATIENT
Start: 2024-05-23 | End: 2024-06-02

## 2024-05-23 RX ORDER — SUCRALFATE 1 G/1
1 TABLET ORAL 4 TIMES DAILY
Qty: 40 TABLET | Refills: 0 | Status: SHIPPED | OUTPATIENT
Start: 2024-05-23 | End: 2024-06-02

## 2024-05-23 RX ADMIN — LIDOCAINE HYDROCHLORIDE 15 ML: 20 SOLUTION ORAL at 14:58

## 2024-05-23 RX ADMIN — ALUMINUM HYDROXIDE, MAGNESIUM HYDROXIDE, DIMETHICONE 30 ML: 200; 200; 20 LIQUID ORAL at 14:58

## 2024-05-23 RX ADMIN — OXYCODONE HYDROCHLORIDE 5 MG: 5 TABLET ORAL at 12:40

## 2024-05-23 NOTE — ED PROVIDER NOTES
"History  Chief Complaint   Patient presents with    Flu Symptoms     Patient reports being at [facility] for respiratory rehab due to COPD and felt generally unwell. Patient reports nausea, fatigue and his heart rate \"dropped to 70s, normally it is in the 90s, I have never seen that before.\"     68-year-old male presents the emergency department with complaints of dizziness.  States that he was at respiratory rehab for COPD when he began to experience symptoms of intermittent dizziness.  States that he has had similar symptoms in the past.  Denies changes in speech or vision.  No weakness in the upper or lower extremities.  States that he noted afterward that his heart rate seem to be low, in the 60s.  States that he has not had a heart rate in the 60s and quite some time.  Denies feelings of palpitations.  Notes that his usual resting heart rate is between 80 and 90.  Additionally complains of some epigastric and mid central chest discomfort which has been intermittent over the past several months.  Has an appointment with gastroenterology for EGD on 7/31/2024 due to ongoing symptoms of gastritis and esophagitis.      History provided by:  Patient   used: No    Flu Symptoms  Presenting symptoms: no cough, no diarrhea, no fever, no headaches, no nausea, no rhinorrhea, no shortness of breath, no sore throat and no vomiting    Severity:  Mild  Onset quality:  Unable to specify  Progression:  Waxing and waning  Chronicity:  New  Relieved by:  Nothing  Ineffective treatments:  None tried  Associated symptoms: no chills, no ear pain and no congestion        Prior to Admission Medications   Prescriptions Last Dose Informant Patient Reported? Taking?   aspirin 81 mg chewable tablet  Self Yes No   Sig: Chew 81 mg daily   azithromycin (ZITHROMAX) 250 mg tablet  Self No No   Sig: Take 1 tablet (250 mg total) by mouth 3 (three) times a week Take 2 tablets today then 1 tablet daily x 4 days "   benzonatate (TESSALON PERLES) 100 mg capsule  Self No No   Sig: Take 1 capsule (100 mg total) by mouth 3 (three) times a day   finasteride (PROSCAR) 5 mg tablet  Self No No   Sig: Take 1 tablet (5 mg total) by mouth daily for 360 doses   fluticasone-umeclidinium-vilanterol (Trelegy Ellipta) 200-62.5-25 mcg/actuation AEPB inhaler  Self No No   Sig: Inhale 1 puff daily Rinse mouth after use.   losartan (COZAAR) 25 mg tablet  Self Yes No   Sig: Take 12.5 mg by mouth daily   oxyCODONE-acetaminophen (PERCOCET) 7.5-325 MG per tablet  Self Yes No   Sig: Take 1 tablet by mouth every 6 (six) hours as needed for moderate pain or severe pain   pantoprazole (PROTONIX) 40 mg tablet   No No   Sig: Take 1 tablet (40 mg total) by mouth daily   predniSONE 5 mg tablet  Self No No   Sig: Take 1 tablet (5 mg total) by mouth daily   tamsulosin (FLOMAX) 0.4 mg   No No   Sig: TAKE 2 CAPSULES (0.8 MG TOTAL) BY MOUTH DAILY AT BEDTIME      Facility-Administered Medications: None       Past Medical History:   Diagnosis Date    Abnormal chest x-ray with multiple lung nodules     last assessed: April 25, 2012    Anxiety     Arthritis     Bipolar 2 disorder, major depressive episode ([facility])     Chronic tension headaches     last assessed: April 25, 2012    COPD (chronic obstructive pulmonary disease) ([facility])     Depression 08/17/1980    Emphysema of lung ([facility]) 1/01/2017    Noticed from my first ct scan    GERD (gastroesophageal reflux disease)     Not sure of first occurance. Haven’t experienced any recent bouts.    Hypertension     Insomnia     Rheumatoid arthritis ([facility]) 10/01/1975    Scoliosis     Substance abuse ([facility]) 1/01/1970    Use of marajuana. Some experiments with cocain, crank and lsd.       Past Surgical History:   Procedure Laterality Date    CHOLECYSTECTOMY OPEN  01/2021    COLONOSCOPY      1-2016 [facility]    TONSILLECTOMY         Family History   Problem Relation Age of Onset    Stroke Mother     Other Mother         epilepsy     Vision  loss Mother     Prostate cancer Father     Dementia Father     Depression Brother     Bipolar disorder Brother     Heart disease Brother      I have reviewed and agree with the history as documented.    E-Cigarette/Vaping    E-Cigarette Use Never User      E-Cigarette/Vaping Substances    Nicotine No     THC No     CBD No     Flavoring No     Other No     Unknown No      Social History     Tobacco Use    Smoking status: Former     Current packs/day: 0.00     Average packs/day: 1 pack/day for 54.0 years (54.0 ttl pk-yrs)     Types: Cigarettes     Start date: 1969     Quit date: 2023     Years since quittin.7    Smokeless tobacco: Never   Vaping Use    Vaping status: Never Used   Substance Use Topics    Alcohol use: Yes     Alcohol/week: 1.0 standard drink of alcohol     Types: 1 Glasses of wine per week     Comment: Not even 1glass of wine per week. Often go for months on end    Drug use: Not Currently     Types: Cocaine, Hashish, LSD, Marijuana       Review of Systems   Constitutional:  Negative for activity change, appetite change, chills and fever.   HENT:  Negative for congestion, dental problem, drooling, ear discharge, ear pain, mouth sores, nosebleeds, rhinorrhea, sore throat and trouble swallowing.    Eyes:  Negative for pain, discharge and itching.   Respiratory:  Negative for cough, chest tightness, shortness of breath and wheezing.    Cardiovascular:  Positive for chest pain. Negative for palpitations.   Gastrointestinal:  Negative for abdominal pain, blood in stool, constipation, diarrhea, nausea and vomiting.   Endocrine: Negative for cold intolerance and heat intolerance.   Genitourinary:  Negative for difficulty urinating, dysuria, flank pain, frequency and urgency.   Skin:  Negative for rash and wound.   Allergic/Immunologic: Negative for food allergies and immunocompromised state.   Neurological:  Positive for dizziness. Negative for seizures, syncope, weakness, numbness and headaches.    Psychiatric/Behavioral:  Negative for agitation, behavioral problems and confusion.        Physical Exam  Physical Exam  Vitals and nursing note reviewed.   Constitutional:       General: He is not in acute distress.     Appearance: He is not diaphoretic.   HENT:      Head: Normocephalic and atraumatic.      Right Ear: External ear normal.      Left Ear: External ear normal.      Mouth/Throat:      Mouth: Oropharynx is clear and moist. Mucous membranes are moist.   Eyes:      Conjunctiva/sclera: Conjunctivae normal.   Neck:      Vascular: No JVD.      Trachea: No tracheal deviation.   Cardiovascular:      Rate and Rhythm: Normal rate and regular rhythm.      Heart sounds: Normal heart sounds. No murmur heard.     No friction rub. No gallop.   Pulmonary:      Effort: Pulmonary effort is normal. No respiratory distress.      Breath sounds: Normal breath sounds. No wheezing or rales.   Chest:      Chest wall: No tenderness.   Abdominal:      General: Bowel sounds are normal. There is no distension.      Palpations: Abdomen is soft.      Tenderness: There is no abdominal tenderness. There is no guarding.   Musculoskeletal:         General: No tenderness, deformity or edema. Normal range of motion.   Lymphadenopathy:      Cervical: No cervical adenopathy.   Skin:     General: Skin is warm and dry.      Findings: No erythema or rash.   Neurological:      General: No focal deficit present.      Mental Status: He is alert and oriented to person, place, and time. Mental status is at baseline.      GCS: GCS eye subscore is 4. GCS verbal subscore is 5. GCS motor subscore is 6.      Cranial Nerves: No cranial nerve deficit or facial asymmetry.      Gait: Gait normal.   Psychiatric:         Mood and Affect: Mood and affect and mood normal.         Behavior: Behavior normal.         Vital Signs  ED Triage Vitals   Temperature Pulse Respirations Blood Pressure SpO2   05/23/24 1158 05/23/24 1145 05/23/24 1145 05/23/24 1157  05/23/24 1145   98.1 °F (36.7 °C) 81 17 141/87 96 %      Temp Source Heart Rate Source Patient Position - Orthostatic VS BP Location FiO2 (%)   05/23/24 1158 05/23/24 1145 05/23/24 1157 05/23/24 1157 --   Tympanic Monitor Sitting Right arm       Pain Score       05/23/24 1240       6           Vitals:    05/23/24 1145 05/23/24 1157 05/23/24 1414   BP:  141/87    Pulse: 81  (S) 59   Patient Position - Orthostatic VS:  Sitting          Visual Acuity      ED Medications  Medications   oxyCODONE (ROXICODONE) IR tablet 5 mg (5 mg Oral Given 5/23/24 1240)   aluminum-magnesium hydroxide-simethicone (MAALOX) oral suspension 30 mL (30 mL Oral Given 5/23/24 1458)   Lidocaine Viscous HCl (XYLOCAINE) 2 % mucosal solution 15 mL (15 mL Swish & Spit Given 5/23/24 1458)       Diagnostic Studies  Results Reviewed       Procedure Component Value Units Date/Time    HS Troponin I 2hr [[id number]]  (Normal) Collected: 05/23/24 1500    Lab Status: Final result Specimen: Blood from Arm, Right Updated: 05/23/24 1529     hs TnI 2hr 3 ng/L      Delta 2hr hsTnI -1 ng/L     HS Troponin I 4hr [[id number]]     Lab Status: No result Specimen: Blood     UA w Reflex to Microscopic w Reflex to Culture [[id number]] Collected: 05/23/24 1241    Lab Status: Final result Specimen: Urine, Clean Catch Updated: 05/23/24 1323     Color, UA Light Yellow     Clarity, UA Clear     Specific Gravity, UA 1.012     pH, UA 6.5     Leukocytes, UA Negative     Nitrite, UA Negative     Protein, UA Negative mg/dl      Glucose, UA Negative mg/dl      Ketones, UA Negative mg/dl      Urobilinogen, UA <2.0 mg/dl      Bilirubin, UA Negative     Occult Blood, UA Negative    B-Type Natriuretic Peptide(BNP) [[id number]]  (Normal) Collected: 05/23/24 1241    Lab Status: Final result Specimen: Blood from Arm, Right Updated: 05/23/24 1321     BNP 17 pg/mL     HS Troponin 0hr (reflex protocol) [[id number]]  (Normal) Collected: 05/23/24 6747    Lab Status: Final result Specimen: Blood  from Arm, Right Updated: 05/23/24 1320     hs TnI 0hr 4 ng/L     Comprehensive metabolic panel [[id number]] Collected: 05/23/24 1241    Lab Status: Final result Specimen: Blood from Arm, Right Updated: 05/23/24 1311     Sodium 136 mmol/L      Potassium 4.3 mmol/L      Chloride 102 mmol/L      CO2 28 mmol/L      ANION GAP 6 mmol/L      BUN 17 mg/dL      Creatinine 0.88 mg/dL      Glucose 102 mg/dL      Calcium 9.4 mg/dL      AST 13 U/L      ALT 12 U/L      Alkaline Phosphatase 61 U/L      Total Protein 6.8 g/dL      Albumin 4.1 g/dL      Total Bilirubin 0.35 mg/dL      eGFR 87 ml/min/1.73sq m     Narrative:      National Kidney Disease Foundation guidelines for Chronic Kidney Disease (CKD):     Stage 1 with normal or high GFR (GFR > 90 mL/min/1.73 square meters)    Stage 2 Mild CKD (GFR = 60-89 mL/min/1.73 square meters)    Stage 3A Moderate CKD (GFR = 45-59 mL/min/1.73 square meters)    Stage 3B Moderate CKD (GFR = 30-44 mL/min/1.73 square meters)    Stage 4 Severe CKD (GFR = 15-29 mL/min/1.73 square meters)    Stage 5 End Stage CKD (GFR <15 mL/min/1.73 square meters)  Note: GFR calculation is accurate only with a steady state creatinine    Protime-INR [[id number]]  (Normal) Collected: 05/23/24 1241    Lab Status: Final result Specimen: Blood from Arm, Right Updated: 05/23/24 1309     Protime 14.0 seconds      INR 1.09    APTT [[id number]]  (Normal) Collected: 05/23/24 1241    Lab Status: Final result Specimen: Blood from Arm, Right Updated: 05/23/24 1309     PTT 28 seconds     CBC and differential [[id number]]  (Abnormal) Collected: 05/23/24 1241    Lab Status: Final result Specimen: Blood from Arm, Right Updated: 05/23/24 1255     WBC 6.54 Thousand/uL      RBC 4.60 Million/uL      Hemoglobin 14.0 g/dL      Hematocrit 43.2 %      MCV 94 fL      MCH 30.4 pg      MCHC 32.4 g/dL      RDW 12.6 %      MPV 9.0 fL      Platelets 300 Thousands/uL      nRBC 0 /100 WBCs      Segmented % 80 %      Immature Grans % 1 %       Lymphocytes % 12 %      Monocytes % 5 %      Eosinophils Relative 1 %      Basophils Relative 1 %      Absolute Neutrophils 5.33 Thousands/µL      Absolute Immature Grans 0.03 Thousand/uL      Absolute Lymphocytes 0.79 Thousands/µL      Absolute Monocytes 0.32 Thousand/µL      Eosinophils Absolute 0.03 Thousand/µL      Basophils Absolute 0.04 Thousands/µL                    XR chest 2 views   ED Interpretation by Amy Haddad PA-C (05/23 4366)   Possible posterior infiltrate.      Final Result by Lucas Matthews MD (05/23 1795)      No acute cardiopulmonary disease.            Workstation performed: [id number]                    Procedures  ECG 12 Lead Documentation Only    Date/Time: 5/23/2024 3:10 PM    Performed by: Amy Haddad PA-C  Authorized by: Amy Haddad PA-C    Indications / Diagnosis:  Dizziness  ECG reviewed by me, the ED Provider: yes    Patient location:  ED  Previous ECG:     Previous ECG:  Compared to current    Comparison ECG info:  2/26/24    Similarity:  No change  Interpretation:     Interpretation: non-specific    Rate:     ECG rate:  60    ECG rate assessment: normal    Rhythm:     Rhythm: sinus rhythm    Ectopy:     Ectopy: none    QRS:     QRS axis:  Normal    QRS intervals:  Normal  Conduction:     Conduction: abnormal      Abnormal conduction: incomplete RBBB    ST segments:     ST segments:  Normal  T waves:     T waves: normal             ED Course  ED Course as of 05/23/24 1614   Thu May 23, 2024   1406 Call placed to reading room regarding CXR.   1446 Discussed test results and reviewed images with patient at this time.  States that he is still having some epigastric burning discomfort.  Reviewed eval and follow-up plans with gastroenterology for 7/31/2024.  Will try GI cocktail in the department.  If patient has improvement of symptoms will discharge home with outpatient follow-up and add Carafate to treatment plan.  Will continue to watch cardiac monitor for any signs of  dysrhythmia.             HEART Risk Score      Flowsheet Row Most Recent Value   Heart Score Risk Calculator    History 1 Filed at: 05/23/2024 1511   ECG 0 Filed at: 05/23/2024 1511   Age 2 Filed at: 05/23/2024 1511   Risk Factors 1 Filed at: 05/23/2024 1511   Troponin 0 Filed at: 05/23/2024 1511   HEART Score 4 Filed at: 05/23/2024 1511                                        Medical Decision Making  Differential diagnosis includes but not limited to: ACS, dysrhythmia, GERD, electrolyte abnormality    Problems Addressed:  Dizziness: acute illness or injury  GERD (gastroesophageal reflux disease): acute illness or injury    Amount and/or Complexity of Data Reviewed  Labs: ordered. Decision-making details documented in ED Course.  Radiology: ordered and independent interpretation performed. Decision-making details documented in ED Course.     Details: Discussed chest x-ray findings with patient.  Small wedge-shaped density posteriorly unable to be seen on AP view.  Previous x-rays reviewed without lateral films in the past year.  Questionable similar density on CT scan  from March 2024 noted to be atelectasis.    Risk  OTC drugs.  Prescription drug management.             Disposition  Final diagnoses:   GERD (gastroesophageal reflux disease)   Dizziness     Time reflects when diagnosis was documented in both MDM as applicable and the Disposition within this note       Time User Action Codes Description Comment    5/23/2024  3:15 PM Amy Haddad Add [K21.9] GERD (gastroesophageal reflux disease)     5/23/2024  3:57 PM Amy Haddad Add [R42] Dizziness           ED Disposition       ED Disposition   Discharge    Condition   Stable    Date/Time   Thu May 23, 2024 1513    Comment   Samuel Flores Jr. discharge to home/self care.                   Follow-up Information       Follow up With Specialties Details Why Contact Info    Roverto Kebede DO Family Medicine   [address]  [address]  [facility]  [address]  [phone]              Discharge Medication List as of 5/23/2024  3:35 PM        START taking these medications    Details   sucralfate (CARAFATE) 1 g/10 mL suspension Take 10 mL (1 g total) by mouth 4 (four) times a day for 10 days, Starting Thu 5/23/2024, Until Sun 6/2/2024, Normal           CONTINUE these medications which have NOT CHANGED    Details   aspirin 81 mg chewable tablet Chew 81 mg daily, Starting Fri 11/17/2023, Until Sat 11/16/2024, Historical Med      azithromycin (ZITHROMAX) 250 mg tablet Take 1 tablet (250 mg total) by mouth 3 (three) times a week Take 2 tablets today then 1 tablet daily x 4 days, Starting Wed 12/6/2023, Until Wed 7/17/2024, Normal      benzonatate (TESSALON PERLES) 100 mg capsule Take 1 capsule (100 mg total) by mouth 3 (three) times a day, Starting Sat 3/2/2024, Normal      finasteride (PROSCAR) 5 mg tablet Take 1 tablet (5 mg total) by mouth daily for 360 doses, Starting Thu 12/7/2023, Until Sun 12/1/2024, Normal      fluticasone-umeclidinium-vilanterol (Trelegy Ellipta) 200-62.5-25 mcg/actuation AEPB inhaler Inhale 1 puff daily Rinse mouth after use., Starting Wed 4/3/2024, Until Sat 3/29/2025, Normal      losartan (COZAAR) 25 mg tablet Take 12.5 mg by mouth daily, Starting Thu 11/16/2023, Historical Med      oxyCODONE-acetaminophen (PERCOCET) 7.5-325 MG per tablet Take 1 tablet by mouth every 6 (six) hours as needed for moderate pain or severe pain, Starting Wed 1/17/2024, Historical Med      pantoprazole (PROTONIX) 40 mg tablet Take 1 tablet (40 mg total) by mouth daily, Starting Thu 4/25/2024, Normal      predniSONE 5 mg tablet Take 1 tablet (5 mg total) by mouth daily, Starting Wed 12/6/2023, Normal      tamsulosin (FLOMAX) 0.4 mg TAKE 2 CAPSULES (0.8 MG TOTAL) BY MOUTH DAILY AT BEDTIME, Starting Tue 5/14/2024, Normal             No discharge procedures on file.    PDMP Review         Value Time User    PDMP Reviewed  Yes 2/13/2024  2:21 PM Nicole Mcneil DO             ED Provider  Electronically Signed by             Amy Haddad PA-C  05/23/24 1615       Amy Haddad PA-C  05/25/24 1286

## 2024-05-23 NOTE — ED NOTES
PILAR requested to transport pt to [facility]     Rylie Rice, PAKO  05/23/24 [id number]

## 2024-05-23 NOTE — PROGRESS NOTES
[facility]        NAME: Samuel Flores Jr. is a 68 y.o. male  : 1955    MRN: [id number]  DATE: May 23, 2024  TIME: 12:42 PM    Assessment and Plan   Chest pain, unspecified type [R07.9]  1. Chest pain, unspecified type        2. Shortness of breath          EKG completed in office. Rate 82. Sinus rhythm. No ST elevation. EMS was called for transfer to ER for further evaluation.     Patient Instructions       Follow up with PCP in 3-5 days.  Proceed to  ER if symptoms worsen.    Chief Complaint     Chief Complaint   Patient presents with    Shortness of Breath    Chest Pain     Patient states that yesterday afternoon he felt nausea and left sided chest pain. He notes that it slightly improved but then felt it again today on his way to cardiac rehab. He notes COPD which he does not qualify for home O2. His HR went down to 60bpm at rehab and was not able to finish due to SOB.          History of Present Illness       Patient is a 68 year old male presenting from Cardiac Rehab with left sided chest pain, dizziness, and low heart rate. He reports that his heart rate was in the 60s at rehab, which he states its never that low. He states that yesterday he did not feel well and had chest pain and nausea. He also reports that last week he started with some intermittent dizziness. Denies radiation of pain. He has chronic back pain and is treated by pain management.     Shortness of Breath  Associated symptoms include chest pain and coughing.   Chest Pain   Associated symptoms include back pain, a cough and shortness of breath. Pertinent negatives include no fever.       Review of Systems   Review of Systems   Constitutional:  Negative for activity change, chills and fever.   Respiratory:  Positive for cough and shortness of breath.    Cardiovascular:  Positive for chest pain.   Musculoskeletal:  Positive for back pain.         Current Medications     No current facility-administered medications for this  visit.    Current Outpatient Medications:     aspirin 81 mg chewable tablet, Chew 81 mg daily, Disp: , Rfl:     azithromycin (ZITHROMAX) 250 mg tablet, Take 1 tablet (250 mg total) by mouth 3 (three) times a week Take 2 tablets today then 1 tablet daily x 4 days, Disp: 24 tablet, Rfl: 3    benzonatate (TESSALON PERLES) 100 mg capsule, Take 1 capsule (100 mg total) by mouth 3 (three) times a day, Disp: 20 capsule, Rfl: 0    finasteride (PROSCAR) 5 mg tablet, Take 1 tablet (5 mg total) by mouth daily for 360 doses, Disp: 90 tablet, Rfl: 3    fluticasone-umeclidinium-vilanterol (Trelegy Ellipta) 200-62.5-25 mcg/actuation AEPB inhaler, Inhale 1 puff daily Rinse mouth after use., Disp: 60 blister, Rfl: 11    losartan (COZAAR) 25 mg tablet, Take 12.5 mg by mouth daily, Disp: , Rfl:     oxyCODONE-acetaminophen (PERCOCET) 7.5-325 MG per tablet, Take 1 tablet by mouth every 6 (six) hours as needed for moderate pain or severe pain, Disp: , Rfl:     pantoprazole (PROTONIX) 40 mg tablet, Take 1 tablet (40 mg total) by mouth daily, Disp: 90 tablet, Rfl: 1    predniSONE 5 mg tablet, Take 1 tablet (5 mg total) by mouth daily, Disp: 90 tablet, Rfl: 3    tamsulosin (FLOMAX) 0.4 mg, TAKE 2 CAPSULES (0.8 MG TOTAL) BY MOUTH DAILY AT BEDTIME, Disp: 180 capsule, Rfl: 1    Current Allergies     Allergies as of 05/23/2024    (No Known Allergies)            The following portions of the patient's history were reviewed and updated as appropriate: allergies, current medications, past family history, past medical history, past social history, past surgical history and problem list.     Past Medical History:   Diagnosis Date    Abnormal chest x-ray with multiple lung nodules     last assessed: April 25, 2012    Anxiety     Arthritis     Bipolar 2 disorder, major depressive episode ([facility])     Chronic tension headaches     last assessed: April 25, 2012    COPD (chronic obstructive pulmonary disease) ([facility])     Depression 08/17/1980    Emphysema of  lung ([facility]) 1/01/2017    Noticed from my first ct scan    GERD (gastroesophageal reflux disease)     Not sure of first occurance. Haven’t experienced any recent bouts.    Hypertension     Insomnia     Rheumatoid arthritis ([facility]) 10/01/1975    Scoliosis     Substance abuse ([facility]) 1/01/1970    Use of marajuana. Some experiments with cocain, crank and lsd.       Past Surgical History:   Procedure Laterality Date    CHOLECYSTECTOMY OPEN  01/2021    COLONOSCOPY      1-2016 [facility]    TONSILLECTOMY         Family History   Problem Relation Age of Onset    Stroke Mother     Other Mother         epilepsy     Vision loss Mother     Prostate cancer Father     Dementia Father     Depression Brother     Bipolar disorder Brother     Heart disease Brother          Medications have been verified.        Objective   /83   Pulse 87   Temp (!) 96.8 °F (36 °C)   Resp (!) 24   SpO2 99%        Physical Exam     Physical Exam  Vitals reviewed.   Constitutional:       General: He is awake. He is not in acute distress.     Appearance: Normal appearance. He is well-developed.   HENT:      Head: Normocephalic.   Cardiovascular:      Rate and Rhythm: Normal rate and regular rhythm.      Heart sounds: Normal heart sounds, S1 normal and S2 normal.   Pulmonary:      Effort: Tachypnea and accessory muscle usage present.      Breath sounds: Decreased breath sounds present. No wheezing or rhonchi.      Comments: Short sentences.   Skin:     General: Skin is warm and moist.   Neurological:      Mental Status: He is alert.   Psychiatric:         Behavior: Behavior is cooperative.

## 2024-05-24 ENCOUNTER — VBI (OUTPATIENT)
Dept: FAMILY MEDICINE CLINIC | Facility: CLINIC | Age: 69
End: 2024-05-24

## 2024-05-24 NOTE — TELEPHONE ENCOUNTER
05/24/24 8:36 AM    Patient contacted post ED visit, first outreach attempt made. Message was left for patient to return a call to the [facility] Department at [facility]: Phone [phone].    Thank you.  ALYSE MOYER  [facility]

## 2024-05-28 ENCOUNTER — CLINICAL SUPPORT (OUTPATIENT)
Dept: PULMONOLOGY | Age: 69
End: 2024-05-28
Payer: MEDICARE

## 2024-05-28 DIAGNOSIS — J44.9 COPD, VERY SEVERE (HCC): Primary | ICD-10-CM

## 2024-05-28 PROCEDURE — 94625 PHY/QHP OP PULM RHB W/O MNTR: CPT

## 2024-05-28 NOTE — TELEPHONE ENCOUNTER
05/28/24 12:15 PM    Patient contacted post ED visit, second outreach attempt made. Message was left for patient to return a call to the VBI Department at [facility]: Phone [phone].    Thank you.  ALYSE MOYER  [facility]

## 2024-05-28 NOTE — TELEPHONE ENCOUNTER
05/28/24 12:18 PM    Patient contacted post ED visit, VBI department spoke with patient/caregiver and outreach was successful.    Thank you.  ALYSE MOYER  [facility]

## 2024-05-30 ENCOUNTER — CLINICAL SUPPORT (OUTPATIENT)
Dept: PULMONOLOGY | Age: 69
End: 2024-05-30
Payer: MEDICARE

## 2024-05-30 DIAGNOSIS — J44.9 COPD, VERY SEVERE (HCC): Primary | ICD-10-CM

## 2024-05-30 PROCEDURE — 94625 PHY/QHP OP PULM RHB W/O MNTR: CPT

## 2024-05-31 ENCOUNTER — OFFICE VISIT (OUTPATIENT)
Dept: FAMILY MEDICINE CLINIC | Facility: CLINIC | Age: 69
End: 2024-05-31
Payer: MEDICARE

## 2024-05-31 VITALS
OXYGEN SATURATION: 95 % | BODY MASS INDEX: 20.14 KG/M2 | WEIGHT: 136 LBS | HEIGHT: 69 IN | HEART RATE: 102 BPM | SYSTOLIC BLOOD PRESSURE: 124 MMHG | TEMPERATURE: 98 F | DIASTOLIC BLOOD PRESSURE: 62 MMHG

## 2024-05-31 DIAGNOSIS — Z12.5 SCREENING FOR PROSTATE CANCER: ICD-10-CM

## 2024-05-31 DIAGNOSIS — N40.0 BENIGN PROSTATIC HYPERPLASIA WITHOUT LOWER URINARY TRACT SYMPTOMS: ICD-10-CM

## 2024-05-31 DIAGNOSIS — R91.1 PULMONARY NODULE SEEN ON IMAGING STUDY: ICD-10-CM

## 2024-05-31 DIAGNOSIS — K21.00 GASTROESOPHAGEAL REFLUX DISEASE WITH ESOPHAGITIS WITHOUT HEMORRHAGE: ICD-10-CM

## 2024-05-31 DIAGNOSIS — F11.20 CONTINUOUS OPIOID DEPENDENCE (HCC): ICD-10-CM

## 2024-05-31 DIAGNOSIS — K57.90 DIVERTICULOSIS: ICD-10-CM

## 2024-05-31 DIAGNOSIS — I51.81 TAKOTSUBO CARDIOMYOPATHY: ICD-10-CM

## 2024-05-31 DIAGNOSIS — Z13.6 ENCOUNTER FOR ABDOMINAL AORTIC ANEURYSM (AAA) SCREENING: ICD-10-CM

## 2024-05-31 DIAGNOSIS — Z00.00 INITIAL MEDICARE ANNUAL WELLNESS VISIT: Primary | ICD-10-CM

## 2024-05-31 DIAGNOSIS — J44.9 COPD, VERY SEVERE (HCC): ICD-10-CM

## 2024-05-31 DIAGNOSIS — E78.00 PURE HYPERCHOLESTEROLEMIA: ICD-10-CM

## 2024-05-31 DIAGNOSIS — H61.21 IMPACTED CERUMEN OF RIGHT EAR: ICD-10-CM

## 2024-05-31 PROBLEM — R63.6 UNDERWEIGHT: Status: RESOLVED | Noted: 2023-08-10 | Resolved: 2024-05-31

## 2024-05-31 PROCEDURE — 99214 OFFICE O/P EST MOD 30 MIN: CPT | Performed by: NURSE PRACTITIONER

## 2024-05-31 PROCEDURE — G0438 PPPS, INITIAL VISIT: HCPCS | Performed by: NURSE PRACTITIONER

## 2024-05-31 NOTE — PROGRESS NOTES
Ambulatory Visit  Name: Samuel Flores Jr.      : 1955      MRN: [id number]  Encounter Provider: RATURO Lira  Encounter Date: 2024   Encounter department: [facility] PRIMARY CARE    Assessment & Plan   1. Initial Medicare annual wellness visit  2. Encounter for abdominal aortic aneurysm (AAA) screening  -     US abdominal aorta screening aaa; Future; Expected date: 2024  3. Screening for prostate cancer  -     PSA, Total Screen; Future; Expected date: 2024  4. Pure hypercholesterolemia  Assessment & Plan:  [facility]  Lipid panel ordered  Orders:  -     TSH, 3rd generation with Free T4 reflex; Future  -     Lipid panel; Future  5. Continuous opioid dependence (HCC)  Assessment & Plan:  Managed by pain management.  6. Impacted cerumen of right ear  7. Pulmonary nodule seen on imaging study  Assessment & Plan:  CT screening up to date- follows with pulm for management  8. COPD, very severe (HCC)  Assessment & Plan:  Follows with pulm for management   9. Takotsubo cardiomyopathy  Assessment & Plan:  Updated echo done  EF improved  10. Gastroesophageal reflux disease with esophagitis without hemorrhage  Assessment & Plan:  Following with gastroenterology.  Has EGD scheduled for this summer.  11. Diverticulosis  Assessment & Plan:  Stable.  Has colonoscopy scheduled for this summer  12. Benign prostatic hyperplasia without lower urinary tract symptoms  Assessment & Plan:  Symptoms currently stable.  Continue with Flomax.  PSA due in November     Thyroid, PSA, Cholesterol labs due in November.   [facility] managing [facility] and [facility] him on [facility]in.   Abdominal aortic aneurysm screening due. This was ordered.   Can start debrox drops right ear- 5-10 drops twice a day for 2-3 days.   Continue to follow with specialists.   F/u here 6 months or sooner if needed- [facility] may be [facility].      Preventive  health issues were discussed with patient, and age appropriate screening tests were ordered as noted in patient's After Visit Summary. Personalized health advice and appropriate referrals for health education or preventive services given if needed, as noted in patient's After Visit Summary.    History of Present Illness   {Disappearing Hyperlinks I Encounters * My Last Note * Since Last Visit * History :93114}  Here for medicare wellness visit and routine follow up.     Follows with pain management He states we referred to pain management but insurance coverage was an issue. [facility] and had intervention without any improvement. They recommended he see neurosurgery. He has appointment 6/20 with neurosurgery. He continues to take the oxycodone as needed which is managed by pain management. He states pain is not manageable without oxycodone. Sees them once a month.     Follows with pulmonology. Has 1/3 lung capacity. Continue with pulm rehab. Breathing is better since procedure.        Patient Care Team:  Roverto Kebede DO as PCP - General (Family Medicine)  MD Gwendolyn Pennington DO    Review of Systems   Constitutional:  Negative for chills and fever.   Eyes:  Negative for discharge.   Respiratory:  Positive for shortness of breath (chronic).    Cardiovascular:  Negative for chest pain.   Gastrointestinal:  Negative for constipation and diarrhea.   Genitourinary:  Negative for difficulty urinating.   Musculoskeletal:  Positive for neck pain (chronic). Negative for joint swelling.   Skin:  Negative for rash.   Neurological:  Negative for headaches.   Hematological:  Negative for adenopathy.   Psychiatric/Behavioral:  The patient is not nervous/anxious.      Medical History Reviewed by provider this encounter:  Tobacco  Allergies  Meds  Problems  Med Hx  Surg Hx  Fam Hx       Annual Wellness Visit Questionnaire   Samuel is here for his Initial Wellness visit.      Health Risk Assessment:   Patient rates overall health as poor. Patient feels that their physical health rating is slightly worse. Patient is satisfied with their life. Eyesight was rated as same. Hearing was rated as same. Patient feels that their emotional and mental health rating is same. Patients states they are never, rarely angry. Patient states they are never, rarely unusually tired/fatigued. Pain experienced in the last 7 days has been a lot. Patient's pain rating has been 7/10. Patient states that he has experienced no weight loss or gain in last 6 months.     Fall Risk Screening:   In the past year, patient has experienced: no history of falling in past year      Home Safety:  Patient does not have trouble with stairs inside or outside of their home. Patient has working smoke alarms and has working carbon monoxide detector. Home safety hazards include: none.     Nutrition:   Current diet is Regular.     Medications:   Patient is currently taking over-the-counter supplements. OTC medications include: see medication list. Patient is able to manage medications.     Activities of Daily Living (ADLs)/Instrumental Activities of Daily Living (IADLs):   Walk and transfer into and out of bed and chair?: Yes  Dress and groom yourself?: Yes    Bathe or shower yourself?: Yes    Feed yourself? Yes  Do your laundry/housekeeping?: Yes  Manage your money, pay your bills and track your expenses?: Yes  Make your own meals?: Yes    Do your own shopping?: Yes    Previous Hospitalizations:   Any hospitalizations or ED visits within the last 12 months?: Yes    How many hospitalizations have you had in the last year?: more than 4    Advance Care Planning:   Living will: No    Durable POA for healthcare: No    Advanced directive: No    Advanced directive counseling given: Yes      PREVENTIVE SCREENINGS      Cardiovascular Screening:    General: Screening Not Indicated and History Lipid Disorder      Diabetes Screening:      General: Screening Current      Colorectal Cancer Screening:     General: Risks and Benefits Discussed      Prostate Cancer Screening:    General: Risks and Benefits Discussed    Due for: PSA      Osteoporosis Screening:    General: Screening Not Indicated      Abdominal Aortic Aneurysm (AAA) Screening:    Risk factors include: age between 65-76 yo and tobacco use        General: Risks and Benefits Discussed    Due for: Screening AAA Ultrasound      Lung Cancer Screening:     General: Screening Current      Hepatitis C Screening:    General: Screening Current      Preventive Screening Comments: Has appointment [facility] colons[facility] in [facility]- [facility]     Screening, Brief Intervention, and Referral to Treatment (SBIRT)    Screening  Typical number of drinks in a day: 0  Typical number of drinks in a week: 0  Interpretation: Low risk drinking behavior.    Single Item Drug Screening:  How often have you used an illegal drug (including marijuana) or a prescription medication for non-medical reasons in the past year? never    Single Item Drug Screen Score: 0  Interpretation: Negative screen for possible drug use disorder    Review of Current Opioid Use    Opioid Risk Tool (ORT) Interpretation: Complete Opioid Risk Tool (ORT)    Social Determinants of Health     Financial Resource Strain: Low Risk  (11/13/2023)    [facility]    Overall Financial Resource Strain (CARDIA)    • Difficulty of Paying Living Expenses: Not hard at all   Food Insecurity: No Food Insecurity (2/27/2024)    Hunger Vital Sign    • Worried About Running Out of Food in the Last Year: Never true    • Ran Out of Food in the Last Year: Never true   Transportation Needs: No Transportation Needs (2/27/2024)    PRAPARE - Transportation    • Lack of Transportation (Medical): No    • Lack of Transportation (Non-Medical): No   Housing Stability: Low Risk  (2/27/2024)    Housing Stability Vital Sign    •  "Unable to Pay for Housing in the Last Year: No    • Number of Places Lived in the Last Year: 1    • Unstable Housing in the Last Year: No   Utilities: Not At Risk (2/27/2024)    [facility] Utilities    • Threatened with loss of utilities: No     No results found.    Objective   {Disappearing Hyperlinks   Review Vitals * Enter New Vitals * Results Review * Labs * Imaging * Cardiology * Procedures * Lung Cancer Screening :55044}  /62 (BP Location: Left arm, Patient Position: Sitting, Cuff Size: Adult)   Pulse 102   Temp 98 °F (36.7 °C) (Temporal)   Ht 5' 9\" (1.753 m)   Wt 61.7 kg (136 lb)   SpO2 95%   BMI 20.08 kg/m²     Physical Exam  Vitals and nursing note reviewed.   Constitutional:       General: He is not in acute distress.     Appearance: Normal appearance. He is not ill-appearing, toxic-appearing or diaphoretic.   HENT:      Head: Normocephalic and atraumatic.      Right Ear: Tympanic membrane, ear canal and external ear normal. There is impacted cerumen.      Left Ear: Tympanic membrane, ear canal and external ear normal. There is no impacted cerumen.      Nose: Nose normal.      Mouth/Throat:      Mouth: Mucous membranes are moist.      Pharynx: Oropharynx is clear. No oropharyngeal exudate or posterior oropharyngeal erythema.   Eyes:      General: Lids are normal. No scleral icterus.        Right eye: No discharge.         Left eye: No discharge.      Extraocular Movements: Extraocular movements intact.      Conjunctiva/sclera: Conjunctivae normal.      Pupils: Pupils are equal, round, and reactive to light.   Cardiovascular:      Rate and Rhythm: Normal rate and regular rhythm. No extrasystoles are present.     Heart sounds: S1 normal and S2 normal. No murmur heard.  Pulmonary:      Effort: Pulmonary effort is normal. No respiratory distress.      Breath sounds: Normal breath sounds. No stridor or decreased air movement. No wheezing or rhonchi.   Abdominal:      General: Bowel sounds are normal.      " Palpations: Abdomen is soft.      Tenderness: There is no abdominal tenderness.   Musculoskeletal:         General: Normal range of motion.      Cervical back: Normal range of motion and neck supple.   Skin:     General: Skin is warm and dry.   Neurological:      General: No focal deficit present.      Mental Status: He is alert and oriented to person, place, and time. Mental status is at baseline.      Cranial Nerves: No cranial nerve deficit.      Sensory: No sensory deficit.      Motor: No weakness.      Coordination: Coordination normal.      Gait: Gait normal.   Psychiatric:         Attention and Perception: Attention and perception normal.         Mood and Affect: Mood and affect normal.         Speech: Speech normal.         Behavior: Behavior is cooperative.         Cognition and Memory: Cognition normal.         Judgment: Judgment normal.       Administrative Statements {Disappearing Hyperlinks I  Level of Service * [id number]}

## 2024-05-31 NOTE — PATIENT INSTRUCTIONS
Thyroid, PSA, Cholesterol labs due in November.   Abdominal aortic aneurysm screening due. This was ordered.   Can start debrox drops right ear- 5-10 drops twice a day for 2-3 days.   Continue to follow with specialists.       Medicare Preventive Visit Patient Instructions  Thank you for completing your Welcome to Medicare Visit or Medicare Annual Wellness Visit today. Your next wellness visit will be due in one year (6/1/2025).  The screening/preventive services that you may require over the next 5-10 years are detailed below. Some tests may not apply to you based off risk factors and/or age. Screening tests ordered at today's visit but not completed yet may show as past due. Also, please note that scanned in results may not display below.  Preventive Screenings:  Service Recommendations Previous Testing/Comments   Colorectal Cancer Screening  Colonoscopy    Fecal Occult Blood Test (FOBT)/Fecal Immunochemical Test (FIT)  Fecal DNA/Cologuard Test  Flexible Sigmoidoscopy Age: 45-75 years old   Colonoscopy: every 10 years (May be performed more frequently if at higher risk)  OR  FOBT/FIT: every 1 year  OR  Cologuard: every 3 years  OR  Sigmoidoscopy: every 5 years  Screening may be recommended earlier than age 45 if at higher risk for colorectal cancer. Also, an individualized decision between you and your healthcare provider will decide whether screening between the ages of 76-85 would be appropriate. Colonoscopy: 01/12/2016  FOBT/FIT: Not on file  Cologuard: Not on file  Sigmoidoscopy: Not on file          Prostate Cancer Screening Individualized decision between patient and health care provider in men between ages of 55-69   Medicare will cover every 12 months beginning on the day after your 50th birthday PSA: 1.1 ng/mL           Hepatitis C Screening Once for adults born between 1945 and 1965  More frequently in patients at high risk for Hepatitis C Hep C Antibody: 03/28/2016    Screening Current   Diabetes  Screening 1-2 times per year if you're at risk for diabetes or have pre-diabetes Fasting glucose: No results in last 5 years (No results in last 5 years)  A1C: 5.6 % (11/13/2023)  Screening Current   Cholesterol Screening Once every 5 years if you don't have a lipid disorder. May order more often based on risk factors. Lipid panel: 09/06/2023  Screening Not Indicated  History Lipid Disorder      Other Preventive Screenings Covered by Medicare:  Abdominal Aortic Aneurysm (AAA) Screening: covered once if your at risk. You're considered to be at risk if you have a family history of AAA or a male between the age of 65-75 who smoking at least 100 cigarettes in your lifetime.  Lung Cancer Screening: covers low dose CT scan once per year if you meet all of the following conditions: (1) Age 55-77; (2) No signs or symptoms of lung cancer; (3) Current smoker or have quit smoking within the last 15 years; (4) You have a tobacco smoking history of at least 20 pack years (packs per day x number of years you smoked); (5) You get a written order from a healthcare provider.  Glaucoma Screening: covered annually if you're considered high risk: (1) You have diabetes OR (2) Family history of glaucoma OR (3)  aged 50 and older OR (4)  American aged 65 and older  Osteoporosis Screening: covered every 2 years if you meet one of the following conditions: (1) Have a vertebral abnormality; (2) On glucocorticoid therapy for more than 3 months; (3) Have primary hyperparathyroidism; (4) On osteoporosis medications and need to assess response to drug therapy.  HIV Screening: covered annually if you're between the age of 15-65. Also covered annually if you are younger than 15 and older than 65 with risk factors for HIV infection. For pregnant patients, it is covered up to 3 times per pregnancy.    Immunizations:  Immunization Recommendations   Influenza Vaccine Annual influenza vaccination during flu season is recommended  for all persons aged >= 6 months who do not have contraindications   Pneumococcal Vaccine   * Pneumococcal conjugate vaccine = PCV13 (Prevnar 13), PCV15 (Vaxneuvance), PCV20 (Prevnar 20)  * Pneumococcal polysaccharide vaccine = PPSV23 (Pneumovax) Adults 19-65 yo with certain risk factors or if 65+ yo  If never received any pneumonia vaccine: recommend Prevnar 20 (PCV20)  Give PCV20 if previously received 1 dose of PCV13 or PPSV23   Hepatitis B Vaccine 3 dose series if at intermediate or high risk (ex: diabetes, end stage renal disease, liver disease)   Respiratory syncytial virus (RSV) Vaccine - COVERED BY MEDICARE PART D  * RSVPreF3 (Arexvy) CDC recommends that adults 60 years of age and older may receive a single dose of RSV vaccine using shared clinical decision-making (SCDM)   Tetanus (Td) Vaccine - COST NOT COVERED BY MEDICARE PART B Following completion of primary series, a booster dose should be given every 10 years to maintain immunity against tetanus. Td may also be given as tetanus wound prophylaxis.   Tdap Vaccine - COST NOT COVERED BY MEDICARE PART B Recommended at least once for all adults. For pregnant patients, recommended with each pregnancy.   Shingles Vaccine (Shingrix) - COST NOT COVERED BY MEDICARE PART B  2 shot series recommended in those 19 years and older who have or will have weakened immune systems or those 50 years and older     Health Maintenance Due:      Topic Date Due    Colorectal Cancer Screening  07/10/2013    Lung Cancer Screening  03/26/2025    Hepatitis C Screening  Completed     Immunizations Due:      Topic Date Due    Hepatitis A Vaccine (1 of 2 - Risk 2-dose series) Never done     Advance Directives   What are advance directives?  Advance directives are legal documents that state your wishes and plans for medical care. These plans are made ahead of time in case you lose your ability to make decisions for yourself. Advance directives can apply to any medical decision, such as  the treatments you want, and if you want to donate organs.   What are the types of advance directives?  There are many types of advance directives, and each state has rules about how to use them. You may choose a combination of any of the following:  Living will:  This is a written record of the treatment you want. You can also choose which treatments you do not want, which to limit, and which to stop at a certain time. This includes surgery, medicine, IV fluid, and tube feedings.   Durable power of  for healthcare (DPAHC):  This is a written record that states who you want to make healthcare choices for you when you are unable to make them for yourself. This person, called a proxy, is usually a family member or a friend. You may choose more than 1 proxy.  Do not resuscitate (DNR) order:  A DNR order is used in case your heart stops beating or you stop breathing. It is a request not to have certain forms of treatment, such as CPR. A DNR order may be included in other types of advance directives.  Medical directive:  This covers the care that you want if you are in a coma, near death, or unable to make decisions for yourself. You can list the treatments you want for each condition. Treatment may include pain medicine, surgery, blood transfusions, dialysis, IV or tube feedings, and a ventilator (breathing machine).  Values history:  This document has questions about your views, beliefs, and how you feel and think about life. This information can help others choose the care that you would choose.  Why are advance directives important?  An advance directive helps you control your care. Although spoken wishes may be used, it is better to have your wishes written down. Spoken wishes can be misunderstood, or not followed. Treatments may be given even if you do not want them. An advance directive may make it easier for your family to make difficult choices about your care.   Narcotic (Opioid) Safety    Use narcotics  safely:  Take prescribed narcotics exactly as directed  Do not give narcotics to others or take narcotics that belong to someone else  Do not mix narcotics without medicines or alcohol  Do not drive or operate heavy machinery after you take the narcotic  Monitor for side effects and notify your healthcare provider if you experienced side effects such as nausea, sleepiness, itching, or trouble thinking clearly.    Manage constipation:    Constipation is the most common side effect of narcotic medicine. Constipation is when you have hard, dry bowel movements, or you go longer than usual between bowel movements. Tell your healthcare provider about all changes in your bowel movements while you are taking narcotics. He or she may recommend laxative medicine to help you have a bowel movement. He or she may also change the kind of narcotic you are taking, or change when you take it. The following are more ways you can prevent or relieve constipation:    Drink liquids as directed.  You may need to drink extra liquids to help soften and move your bowels. Ask how much liquid to drink each day and which liquids are best for you.  Eat high-fiber foods.  This may help decrease constipation by adding bulk to your bowel movements. High-fiber foods include fruits, vegetables, whole-grain breads and cereals, and beans. Your healthcare provider or dietitian can help you create a high-fiber meal plan. Your provider may also recommend a fiber supplement if you cannot get enough fiber from food.  Exercise regularly.  Regular physical activity can help stimulate your intestines. Walking is a good exercise to prevent or relieve constipation. Ask which exercises are best for you.  Schedule a time each day to have a bowel movement.  This may help train your body to have regular bowel movements. Bend forward while you are on the toilet to help move the bowel movement out. Sit on the toilet for at least 10 minutes, even if you do not have a  bowel movement.    Store narcotics safely:   Store narcotics where others cannot easily get them.  Keep them in a locked cabinet or secure area. Do not  keep them in a purse or other bag you carry with you. A person may be looking for something else and find the narcotics.  Make sure narcotics are stored out of the reach of children.  A child can easily overdose on narcotics. Narcotics may look like candy to a small child.    The best way to dispose of narcotics:      The laws vary by country and area. In the [place], the best way is to return the narcotics through a take-back program. This program is offered by the [place] Drug Enforcement Agency ([employer]OSHUA). The following are options for using the program:  Take the narcotics to a [employer] collection site.  The site is often a law enforcement center. Call your local law enforcement center for scheduled take-back days in your area. You will be given information on where to go if the collection site is in a different location.  Take the narcotics to an approved pharmacy or hospital.  A pharmacy or hospital may be set up as a collection site. You will need to ask if it is a [employer] collection site if you were not directed there. A pharmacy or doctor's office may not be able to take back narcotics unless it is a [employer] site.  Use a mail-back system.  This means you are given containers to put the narcotics into. You will then mail them in the containers.  Use a take-back drop box.  This is a place to leave the narcotics at any time. People and animals will not be able to get into the box. Your local law enforcement agency can tell you where to find a drop box in your area.    Other ways to manage pain:   Ask your healthcare provider about non-narcotic medicines to control pain.  Nonprescription medicines include NSAIDs (such as ibuprofen) and acetaminophen. Prescription medicines include muscle relaxers, antidepressants, and steroids.  Pain may be managed without any medicines.  Some  ways to relieve pain include massage, aromatherapy, or meditation. Physical or occupational therapy may also help.    For more information:   Drug Enforcement Administration  [address] [place] [address]  Phone: 7- 146 - 810-3173  Web Address: https://www.deadiversion.[facility].gov/drug_disposal/    [place] Food and Drug Administration  [address] [place] [address]  Phone: 0- 720 - 910-2050  Web Address: http://www.fda.gov     © Copyright [employer] 2018 Information is for End User's use only and may not be sold, redistributed or otherwise used for commercial purposes. All illustrations and images included in CareNotes® are the copyrighted property of [employer]M., Inc[employer] or [employer]

## 2024-06-01 ENCOUNTER — HOSPITAL ENCOUNTER (OUTPATIENT)
Dept: ULTRASOUND IMAGING | Facility: HOSPITAL | Age: 69
Discharge: HOME | End: 2024-06-01
Payer: MEDICARE

## 2024-06-01 DIAGNOSIS — Z13.6 ENCOUNTER FOR ABDOMINAL AORTIC ANEURYSM (AAA) SCREENING: ICD-10-CM

## 2024-06-01 PROCEDURE — 76706 US ABDL AORTA SCREEN AAA: CPT

## 2024-06-04 ENCOUNTER — CLINICAL SUPPORT (OUTPATIENT)
Dept: PULMONOLOGY | Age: 69
End: 2024-06-04
Payer: MEDICARE

## 2024-06-04 DIAGNOSIS — J44.9 COPD, VERY SEVERE (HCC): Primary | ICD-10-CM

## 2024-06-04 PROCEDURE — 94625 PHY/QHP OP PULM RHB W/O MNTR: CPT

## 2024-06-06 ENCOUNTER — APPOINTMENT (OUTPATIENT)
Dept: PULMONOLOGY | Age: 69
End: 2024-06-06
Payer: MEDICARE

## 2024-06-11 ENCOUNTER — TELEPHONE (OUTPATIENT)
Dept: PSYCHIATRY | Facility: CLINIC | Age: 69
End: 2024-06-11

## 2024-06-11 ENCOUNTER — APPOINTMENT (OUTPATIENT)
Dept: PULMONOLOGY | Age: 69
End: 2024-06-11
Payer: MEDICARE

## 2024-06-11 NOTE — TELEPHONE ENCOUNTER
The writer attempted to contact the patient to verify the need for services. The writer LVM for the patient to contact the intake department for assistance with being placed on proper wait list.

## 2024-06-12 ENCOUNTER — PATIENT OUTREACH (OUTPATIENT)
Dept: OTHER | Facility: CLINIC | Age: 69
End: 2024-06-12

## 2024-06-12 NOTE — TELEPHONE ENCOUNTER
Patient has been added to the [facility] wait list without a referral.    Insurance: medicare  Insurance Type:    Commercial []   Medicaid []   [facility] (if applicable)   Medicare [x]  Location Preference: no loc pref  Provider Preference: no prov pref  Virtual: Yes [] No [x]  Were outside resources sent: Yes [] No [x]

## 2024-06-13 ENCOUNTER — APPOINTMENT (OUTPATIENT)
Dept: PULMONOLOGY | Age: 69
End: 2024-06-13
Payer: MEDICARE

## 2024-06-18 ENCOUNTER — APPOINTMENT (OUTPATIENT)
Dept: PULMONOLOGY | Age: 69
End: 2024-06-18
Payer: MEDICARE

## 2024-06-20 ENCOUNTER — APPOINTMENT (OUTPATIENT)
Dept: PULMONOLOGY | Age: 69
End: 2024-06-20
Payer: MEDICARE

## 2024-06-25 ENCOUNTER — APPOINTMENT (OUTPATIENT)
Dept: PULMONOLOGY | Age: 69
End: 2024-06-25
Payer: MEDICARE

## 2024-06-27 ENCOUNTER — APPOINTMENT (OUTPATIENT)
Dept: PULMONOLOGY | Age: 69
End: 2024-06-27
Payer: MEDICARE

## 2024-07-03 ENCOUNTER — OFFICE VISIT (OUTPATIENT)
Dept: GASTROENTEROLOGY | Facility: MEDICAL CENTER | Age: 69
End: 2024-07-03
Payer: MEDICARE

## 2024-07-03 VITALS
TEMPERATURE: 97.3 F | HEART RATE: 72 BPM | DIASTOLIC BLOOD PRESSURE: 69 MMHG | BODY MASS INDEX: 19.88 KG/M2 | OXYGEN SATURATION: 100 % | SYSTOLIC BLOOD PRESSURE: 101 MMHG | HEIGHT: 69 IN | WEIGHT: 134.2 LBS

## 2024-07-03 DIAGNOSIS — K59.09 CHRONIC CONSTIPATION: ICD-10-CM

## 2024-07-03 DIAGNOSIS — Z86.010 PERSONAL HISTORY OF COLONIC POLYPS: Primary | ICD-10-CM

## 2024-07-03 DIAGNOSIS — K21.00 GASTROESOPHAGEAL REFLUX DISEASE WITH ESOPHAGITIS, UNSPECIFIED WHETHER HEMORRHAGE: ICD-10-CM

## 2024-07-03 PROCEDURE — 99214 OFFICE O/P EST MOD 30 MIN: CPT | Performed by: NURSE PRACTITIONER

## 2024-07-03 RX ORDER — OXYCODONE AND ACETAMINOPHEN 10; 325 MG/1; MG/1
1 TABLET ORAL EVERY 4 HOURS PRN
COMMUNITY
Start: 2024-06-11

## 2024-07-03 RX ORDER — PANTOPRAZOLE SODIUM 40 MG/1
40 TABLET, DELAYED RELEASE ORAL 2 TIMES DAILY
Qty: 180 TABLET | Refills: 3 | Status: SHIPPED | OUTPATIENT
Start: 2024-07-03

## 2024-07-03 NOTE — PROGRESS NOTES
[facility] - Outpatient Follow-up Note  Samuel Flores . 68 y.o. male MRN: [id number]  Encounter: [id number]          ASSESSMENT AND PLAN:      1.  GERD  2.  Epigastric pain    History of chronic heartburn/reflux over the past several months.  Was occurring several times per week.  Cannot pinpoint triggers.  He was drinking 3 cups of caffeine per day but now only drinking 2 cups/week.  He is on chronic prednisone 5 mg daily for severe COPD.  He recently had valves placed in his left lung and his respiratory status is significantly improved.  He did recently restart smoking.  Currently taking pantoprazole 40 mg daily.  At last visit it was increased to twice daily but he never received the prescription.  He has decreased his caffeine intake to several cups per week from 3 cups/day.Will rule out PUD, H. pylori, gastritis/esophagitis, celiac. Patient does not want to stop PPI to check for H. pylori at this time. Will check during EGD.     -Increase pantoprazole 40 mg twice daily  -Antireflux diet  -EGD in hospital as scheduled 7/16/2024 with colonoscopy        3.  Personal history of colon polyps    He has been on oxycodone for chronic back pain over the past 6 months. Reports BMs are brown and formed every other day. He does use Ex-Lax as needed to have a BM. Denies any melena hematochezia. Has tried MiraLAX but not on a daily basis with little help. Last colonoscopy was 1/16 which noted severe diverticulosis and 2 polyps. Path report not available during visit today. Repeat colonoscopy recommended in 5 years.     -Colonoscopy with 2-day prep as scheduled in hospital setting with the EGD 7/16/2024  -Increase water intake to 8 cups/day  -High-fiber diet  -Fiber supplement daily    ______________________________________________________________________    SUBJECTIVE: 68-year-old male here for follow-up.  He was last seen by myself 2/20/2024 for GERD and personal history of colon  polyps.    Started approximately 7 months ago with epigastric burning and midsternal chest burning with regurgitation. This can occur several times per week. Cannot pinpoint triggers. He was drinking 3 cups of caffeine per day but now only drinking 2 cups/week. He is on chronic prednisone 5 mg daily for his severe COPD. Reports last EGD approximately 10 years ago. States he was treated for H. pylori x 2 in the past. Recently started on pantoprazole 40 mg daily with some help. PCP increased it to twice daily but he has not started that dose yet. Denies any dysphagia. Some nausea but no vomiting. This can occur at any time. No alcohol use. He was drinking 3 cups of caffeine per day but now only using 2 cups/week. No NSAID use.     He has been on oxycodone for chronic back pain over the past 6 months. Reports BMs are brown and formed every other day. He does use Ex-Lax as needed to have a BM. Denies any melena hematochezia. Has tried MiraLAX but not on a daily basis with little help. Last colonoscopy was 1/16 which noted severe diverticulosis and 2 polyps. Path report not available during visit today. Repeat colonoscopy recommended in 5 years.     Interval history: History of severe COPD for which he had valves placed in the left lower lobe several months ago.  Reports significant improvement since having these valves placed.  His exercise capacity has improved.  Did not have his EGD and colonoscopy that were recommended at last visit.  We did get pulmonary clearance after valves placed.  He does have repeat EGD and colonoscopy scheduled for 7/16/2024.  He is only taking his pantoprazole 40 mg daily.  He does get some reflux breakthrough.  BMs are brown and formed every other day.  Reports MiraLAX did not help and he does not want to take it.  He is using Ex-Lax as needed several times per week.  He does not like taking anything else for his BMs.  Restarted smoking recently due to going through divorce.    Labs  -CMP normal, CBC normal.      Prior EGD/colonoscopy     Colonoscopy -severe diverticulosis, 2 polyps.  Path report not available during visit today.  Recall colonoscopy recommended in 5 years.     EGD more then 10 years ago- normal per pt    REVIEW OF SYSTEMS IS OTHERWISE NEGATIVE.  10 point review of systems negative other than per HPI      Historical Information   Past Medical History:   Diagnosis Date   • Abnormal chest x-ray with multiple lung nodules     last assessed: 2012   • Anxiety    • Arthritis    • Bipolar 2 disorder, major depressive episode ([facility])    • Chronic tension headaches     last assessed: 2012   • COPD (chronic obstructive pulmonary disease) ([facility])    • Depression 1980   • Emphysema of lung ([facility]) 2017    Noticed from my first ct scan   • GERD (gastroesophageal reflux disease)     Not sure of first occurance. Haven’t experienced any recent bouts.   • Hypertension    • Insomnia    • Rheumatoid arthritis ([facility]) 10/01/1975   • Scoliosis    • Substance abuse ([facility]) 1970    Use of marajuana. Some experiments with cocain, crank and lsd.     Past Surgical History:   Procedure Laterality Date   • CHOLECYSTECTOMY OPEN  2021   • COLONOSCOPY      - [facility]   • ESOPHAGUS SURGERY  2024    Valves   • TONSILLECTOMY       Social History   Social History     Substance and Sexual Activity   Alcohol Use Not Currently   • Alcohol/week: 1.0 standard drink of alcohol   • Types: 1 Glasses of wine per week    Comment: Not even 1glass of wine per week. Often go for months on end     Social History     Substance and Sexual Activity   Drug Use Not Currently   • Types: Cocaine, Hashish, LSD, Marijuana     Social History     Tobacco Use   Smoking Status Every Day   • Current packs/day: 0.00   • Average packs/day: 1 pack/day for 54.0 years (54.0 ttl pk-yrs)   • Types: Cigarettes   • Start date: 1969   • Last attempt to quit: 2023   • Years since quittin.8  "  Smokeless Tobacco Never     Family History   Problem Relation Age of Onset   • Stroke Mother    • Other Mother         epilepsy    • Vision loss Mother    • Prostate cancer Father    • Dementia Father    • Depression Brother    • Bipolar disorder Brother    • Heart disease Brother        Meds/Allergies       Current Outpatient Medications:   •  aspirin 81 mg chewable tablet  •  Cholecalciferol 25 MCG (1000 UT) capsule  •  finasteride (PROSCAR) 5 mg tablet  •  fluticasone-umeclidinium-vilanterol (Trelegy Ellipta) 200-62.5-25 mcg/actuation AEPB inhaler  •  losartan (COZAAR) 25 mg tablet  •  oxyCODONE-acetaminophen (PERCOCET)  mg per tablet  •  pantoprazole (PROTONIX) 40 mg tablet  •  predniSONE 5 mg tablet  •  tamsulosin (FLOMAX) 0.4 mg  •  benzonatate (TESSALON PERLES) 100 mg capsule  •  oxyCODONE-acetaminophen (PERCOCET) 7.5-325 MG per tablet  •  sucralfate (CARAFATE) 1 g tablet  •  sucralfate (CARAFATE) 1 g/10 mL suspension    Allergies   Allergen Reactions   • Albuterol Shortness Of Breath           Objective     Blood pressure 101/69, pulse 72, temperature (!) 97.3 °F (36.3 °C), temperature source Tympanic, height 5' 9\" (1.753 m), weight 60.9 kg (134 lb 3.2 oz), SpO2 100%. Body mass index is 19.82 kg/m².      PHYSICAL EXAM:      General Appearance:   Alert, cooperative, no distress   HEENT:   Normocephalic, atraumatic, anicteric.     Neck:  Supple, symmetrical, trachea midline   Lungs:   Clear to auscultation bilaterally; no rales, rhonchi or wheezing; respirations unlabored    Heart::   Regular rate and rhythm; no murmur, rub, or gallop.   Abdomen:   Soft, non-tender, non-distended; normal bowel sounds; no masses, no organomegaly    Genitalia:   Deferred    Rectal:   Deferred    Extremities:  No cyanosis, clubbing or edema    Pulses:  2+ and symmetric    Skin:  No jaundice, rashes, or lesions    Lymph nodes:  No palpable cervical lymphadenopathy        Lab Results:   No visits with results within 1 " Day(s) from this visit.   Latest known visit with results is:   Admission on 05/23/2024, Discharged on 05/23/2024   Component Date Value   • WBC 05/23/2024 6.54    • RBC 05/23/2024 4.60    • Hemoglobin 05/23/2024 14.0    • Hematocrit 05/23/2024 43.2    • MCV 05/23/2024 94    • MCH 05/23/2024 30.4    • MCHC 05/23/2024 32.4    • RDW 05/23/2024 12.6    • MPV 05/23/2024 9.0    • Platelets 05/23/2024 300    • nRBC 05/23/2024 0    • Segmented % 05/23/2024 80 (H)    • Immature Grans % 05/23/2024 1    • Lymphocytes % 05/23/2024 12 (L)    • Monocytes % 05/23/2024 5    • Eosinophils Relative 05/23/2024 1    • Basophils Relative 05/23/2024 1    • Absolute Neutrophils 05/23/2024 5.33    • Absolute Immature Grans 05/23/2024 0.03    • Absolute Lymphocytes 05/23/2024 0.79    • Absolute Monocytes 05/23/2024 0.32    • Eosinophils Absolute 05/23/2024 0.03    • Basophils Absolute 05/23/2024 0.04    • Protime 05/23/2024 14.0    • INR 05/23/2024 1.09    • PTT 05/23/2024 28    • Sodium 05/23/2024 136    • Potassium 05/23/2024 4.3    • Chloride 05/23/2024 102    • CO2 05/23/2024 28    • ANION GAP 05/23/2024 6    • BUN 05/23/2024 17    • Creatinine 05/23/2024 0.88    • Glucose 05/23/2024 102    • Calcium 05/23/2024 9.4    • AST 05/23/2024 13    • ALT 05/23/2024 12    • Alkaline Phosphatase 05/23/2024 61    • Total Protein 05/23/2024 6.8    • Albumin 05/23/2024 4.1    • Total Bilirubin 05/23/2024 0.35    • eGFR 05/23/2024 87    • hs TnI 0hr 05/23/2024 4    • BNP 05/23/2024 17    • Color, UA 05/23/2024 Light Yellow    • Clarity, UA 05/23/2024 Clear    • Specific Gravity, UA 05/23/2024 1.012    • pH, UA 05/23/2024 6.5    • Leukocytes, UA 05/23/2024 Negative    • Nitrite, UA 05/23/2024 Negative    • Protein, UA 05/23/2024 Negative    • Glucose, UA 05/23/2024 Negative    • Ketones, UA 05/23/2024 Negative    • Urobilinogen, UA 05/23/2024 <2.0    • Bilirubin, UA 05/23/2024 Negative    • Occult Blood, UA 05/23/2024 Negative    • hs TnI 2hr  05/23/2024 3    • Delta 2hr hsTnI 05/23/2024 -1    • Ventricular Rate 05/23/2024 61    • Atrial Rate 05/23/2024 61    • SC Interval 05/23/2024 154    • QRSD Interval 05/23/2024 98    • QT Interval 05/23/2024 394    • QTC Interval 05/23/2024 396    • P Axis 05/23/2024 82    • QRS Axis 05/23/2024 52    • T Wave Axis 05/23/2024 64    • Ventricular Rate 05/23/2024 82    • Atrial Rate 05/23/2024 82    • SC Interval 05/23/2024 158    • QRSD Interval 05/23/2024 92    • QT Interval 05/23/2024 366    • QTC Interval 05/23/2024 427    • P Axis 05/23/2024 87    • QRS Axis 05/23/2024 14    • T Wave Axis 05/23/2024 72          Radiology Results:   No results found.

## 2024-07-03 NOTE — H&P (VIEW-ONLY)
[facility] - Outpatient Follow-up Note  Samuel Flores . 68 y.o. male MRN: [id number]  Encounter: [id number]          ASSESSMENT AND PLAN:      1.  GERD  2.  Epigastric pain    History of chronic heartburn/reflux over the past several months.  Was occurring several times per week.  Cannot pinpoint triggers.  He was drinking 3 cups of caffeine per day but now only drinking 2 cups/week.  He is on chronic prednisone 5 mg daily for severe COPD.  He recently had valves placed in his left lung and his respiratory status is significantly improved.  He did recently restart smoking.  Currently taking pantoprazole 40 mg daily.  At last visit it was increased to twice daily but he never received the prescription.  He has decreased his caffeine intake to several cups per week from 3 cups/day.Will rule out PUD, H. pylori, gastritis/esophagitis, celiac. Patient does not want to stop PPI to check for H. pylori at this time. Will check during EGD.     -Increase pantoprazole 40 mg twice daily  -Antireflux diet  -EGD in hospital as scheduled 7/16/2024 with colonoscopy        3.  Personal history of colon polyps    He has been on oxycodone for chronic back pain over the past 6 months. Reports BMs are brown and formed every other day. He does use Ex-Lax as needed to have a BM. Denies any melena hematochezia. Has tried MiraLAX but not on a daily basis with little help. Last colonoscopy was 1/16 which noted severe diverticulosis and 2 polyps. Path report not available during visit today. Repeat colonoscopy recommended in 5 years.     -Colonoscopy with 2-day prep as scheduled in hospital setting with the EGD 7/16/2024  -Increase water intake to 8 cups/day  -High-fiber diet  -Fiber supplement daily    ______________________________________________________________________    SUBJECTIVE: 68-year-old male here for follow-up.  He was last seen by myself 2/20/2024 for GERD and personal history of colon  polyps.    Started approximately 7 months ago with epigastric burning and midsternal chest burning with regurgitation. This can occur several times per week. Cannot pinpoint triggers. He was drinking 3 cups of caffeine per day but now only drinking 2 cups/week. He is on chronic prednisone 5 mg daily for his severe COPD. Reports last EGD approximately 10 years ago. States he was treated for H. pylori x 2 in the past. Recently started on pantoprazole 40 mg daily with some help. PCP increased it to twice daily but he has not started that dose yet. Denies any dysphagia. Some nausea but no vomiting. This can occur at any time. No alcohol use. He was drinking 3 cups of caffeine per day but now only using 2 cups/week. No NSAID use.     He has been on oxycodone for chronic back pain over the past 6 months. Reports BMs are brown and formed every other day. He does use Ex-Lax as needed to have a BM. Denies any melena hematochezia. Has tried MiraLAX but not on a daily basis with little help. Last colonoscopy was 1/16 which noted severe diverticulosis and 2 polyps. Path report not available during visit today. Repeat colonoscopy recommended in 5 years.     Interval history: History of severe COPD for which he had valves placed in the left lower lobe several months ago.  Reports significant improvement since having these valves placed.  His exercise capacity has improved.  Did not have his EGD and colonoscopy that were recommended at last visit.  We did get pulmonary clearance after valves placed.  He does have repeat EGD and colonoscopy scheduled for 7/16/2024.  He is only taking his pantoprazole 40 mg daily.  He does get some reflux breakthrough.  BMs are brown and formed every other day.  Reports MiraLAX did not help and he does not want to take it.  He is using Ex-Lax as needed several times per week.  He does not like taking anything else for his BMs.  Restarted smoking recently due to going through divorce.    Labs  -CMP normal, CBC normal.      Prior EGD/colonoscopy     Colonoscopy -severe diverticulosis, 2 polyps.  Path report not available during visit today.  Recall colonoscopy recommended in 5 years.     EGD more then 10 years ago- normal per pt    REVIEW OF SYSTEMS IS OTHERWISE NEGATIVE.  10 point review of systems negative other than per HPI      Historical Information   Past Medical History:   Diagnosis Date   • Abnormal chest x-ray with multiple lung nodules     last assessed: 2012   • Anxiety    • Arthritis    • Bipolar 2 disorder, major depressive episode ([facility])    • Chronic tension headaches     last assessed: 2012   • COPD (chronic obstructive pulmonary disease) ([facility])    • Depression 1980   • Emphysema of lung ([facility]) 2017    Noticed from my first ct scan   • GERD (gastroesophageal reflux disease)     Not sure of first occurance. Haven’t experienced any recent bouts.   • Hypertension    • Insomnia    • Rheumatoid arthritis ([facility]) 10/01/1975   • Scoliosis    • Substance abuse ([facility]) 1970    Use of marajuana. Some experiments with cocain, crank and lsd.     Past Surgical History:   Procedure Laterality Date   • CHOLECYSTECTOMY OPEN  2021   • COLONOSCOPY      - [facility]   • ESOPHAGUS SURGERY  2024    Valves   • TONSILLECTOMY       Social History   Social History     Substance and Sexual Activity   Alcohol Use Not Currently   • Alcohol/week: 1.0 standard drink of alcohol   • Types: 1 Glasses of wine per week    Comment: Not even 1glass of wine per week. Often go for months on end     Social History     Substance and Sexual Activity   Drug Use Not Currently   • Types: Cocaine, Hashish, LSD, Marijuana     Social History     Tobacco Use   Smoking Status Every Day   • Current packs/day: 0.00   • Average packs/day: 1 pack/day for 54.0 years (54.0 ttl pk-yrs)   • Types: Cigarettes   • Start date: 1969   • Last attempt to quit: 2023   • Years since quittin.8  "  Smokeless Tobacco Never     Family History   Problem Relation Age of Onset   • Stroke Mother    • Other Mother         epilepsy    • Vision loss Mother    • Prostate cancer Father    • Dementia Father    • Depression Brother    • Bipolar disorder Brother    • Heart disease Brother        Meds/Allergies       Current Outpatient Medications:   •  aspirin 81 mg chewable tablet  •  Cholecalciferol 25 MCG (1000 UT) capsule  •  finasteride (PROSCAR) 5 mg tablet  •  fluticasone-umeclidinium-vilanterol (Trelegy Ellipta) 200-62.5-25 mcg/actuation AEPB inhaler  •  losartan (COZAAR) 25 mg tablet  •  oxyCODONE-acetaminophen (PERCOCET)  mg per tablet  •  pantoprazole (PROTONIX) 40 mg tablet  •  predniSONE 5 mg tablet  •  tamsulosin (FLOMAX) 0.4 mg  •  benzonatate (TESSALON PERLES) 100 mg capsule  •  oxyCODONE-acetaminophen (PERCOCET) 7.5-325 MG per tablet  •  sucralfate (CARAFATE) 1 g tablet  •  sucralfate (CARAFATE) 1 g/10 mL suspension    Allergies   Allergen Reactions   • Albuterol Shortness Of Breath           Objective     Blood pressure 101/69, pulse 72, temperature (!) 97.3 °F (36.3 °C), temperature source Tympanic, height 5' 9\" (1.753 m), weight 60.9 kg (134 lb 3.2 oz), SpO2 100%. Body mass index is 19.82 kg/m².      PHYSICAL EXAM:      General Appearance:   Alert, cooperative, no distress   HEENT:   Normocephalic, atraumatic, anicteric.     Neck:  Supple, symmetrical, trachea midline   Lungs:   Clear to auscultation bilaterally; no rales, rhonchi or wheezing; respirations unlabored    Heart::   Regular rate and rhythm; no murmur, rub, or gallop.   Abdomen:   Soft, non-tender, non-distended; normal bowel sounds; no masses, no organomegaly    Genitalia:   Deferred    Rectal:   Deferred    Extremities:  No cyanosis, clubbing or edema    Pulses:  2+ and symmetric    Skin:  No jaundice, rashes, or lesions    Lymph nodes:  No palpable cervical lymphadenopathy        Lab Results:   No visits with results within 1 " Day(s) from this visit.   Latest known visit with results is:   Admission on 05/23/2024, Discharged on 05/23/2024   Component Date Value   • WBC 05/23/2024 6.54    • RBC 05/23/2024 4.60    • Hemoglobin 05/23/2024 14.0    • Hematocrit 05/23/2024 43.2    • MCV 05/23/2024 94    • MCH 05/23/2024 30.4    • MCHC 05/23/2024 32.4    • RDW 05/23/2024 12.6    • MPV 05/23/2024 9.0    • Platelets 05/23/2024 300    • nRBC 05/23/2024 0    • Segmented % 05/23/2024 80 (H)    • Immature Grans % 05/23/2024 1    • Lymphocytes % 05/23/2024 12 (L)    • Monocytes % 05/23/2024 5    • Eosinophils Relative 05/23/2024 1    • Basophils Relative 05/23/2024 1    • Absolute Neutrophils 05/23/2024 5.33    • Absolute Immature Grans 05/23/2024 0.03    • Absolute Lymphocytes 05/23/2024 0.79    • Absolute Monocytes 05/23/2024 0.32    • Eosinophils Absolute 05/23/2024 0.03    • Basophils Absolute 05/23/2024 0.04    • Protime 05/23/2024 14.0    • INR 05/23/2024 1.09    • PTT 05/23/2024 28    • Sodium 05/23/2024 136    • Potassium 05/23/2024 4.3    • Chloride 05/23/2024 102    • CO2 05/23/2024 28    • ANION GAP 05/23/2024 6    • BUN 05/23/2024 17    • Creatinine 05/23/2024 0.88    • Glucose 05/23/2024 102    • Calcium 05/23/2024 9.4    • AST 05/23/2024 13    • ALT 05/23/2024 12    • Alkaline Phosphatase 05/23/2024 61    • Total Protein 05/23/2024 6.8    • Albumin 05/23/2024 4.1    • Total Bilirubin 05/23/2024 0.35    • eGFR 05/23/2024 87    • hs TnI 0hr 05/23/2024 4    • BNP 05/23/2024 17    • Color, UA 05/23/2024 Light Yellow    • Clarity, UA 05/23/2024 Clear    • Specific Gravity, UA 05/23/2024 1.012    • pH, UA 05/23/2024 6.5    • Leukocytes, UA 05/23/2024 Negative    • Nitrite, UA 05/23/2024 Negative    • Protein, UA 05/23/2024 Negative    • Glucose, UA 05/23/2024 Negative    • Ketones, UA 05/23/2024 Negative    • Urobilinogen, UA 05/23/2024 <2.0    • Bilirubin, UA 05/23/2024 Negative    • Occult Blood, UA 05/23/2024 Negative    • hs TnI 2hr  05/23/2024 3    • Delta 2hr hsTnI 05/23/2024 -1    • Ventricular Rate 05/23/2024 61    • Atrial Rate 05/23/2024 61    • MO Interval 05/23/2024 154    • QRSD Interval 05/23/2024 98    • QT Interval 05/23/2024 394    • QTC Interval 05/23/2024 396    • P Axis 05/23/2024 82    • QRS Axis 05/23/2024 52    • T Wave Axis 05/23/2024 64    • Ventricular Rate 05/23/2024 82    • Atrial Rate 05/23/2024 82    • MO Interval 05/23/2024 158    • QRSD Interval 05/23/2024 92    • QT Interval 05/23/2024 366    • QTC Interval 05/23/2024 427    • P Axis 05/23/2024 87    • QRS Axis 05/23/2024 14    • T Wave Axis 05/23/2024 72          Radiology Results:   No results found.

## 2024-07-15 ENCOUNTER — TELEPHONE (OUTPATIENT)
Dept: GASTROENTEROLOGY | Facility: HOSPITAL | Age: 69
End: 2024-07-15

## 2024-07-16 ENCOUNTER — ANESTHESIA EVENT (OUTPATIENT)
Dept: GASTROENTEROLOGY | Facility: HOSPITAL | Age: 69
End: 2024-07-16

## 2024-07-16 ENCOUNTER — HOSPITAL ENCOUNTER (OUTPATIENT)
Dept: GASTROENTEROLOGY | Facility: HOSPITAL | Age: 69
Setting detail: OUTPATIENT SURGERY
Discharge: HOME | End: 2024-07-16
Attending: INTERNAL MEDICINE
Payer: MEDICARE

## 2024-07-16 ENCOUNTER — ANESTHESIA (OUTPATIENT)
Dept: GASTROENTEROLOGY | Facility: HOSPITAL | Age: 69
End: 2024-07-16

## 2024-07-16 VITALS
SYSTOLIC BLOOD PRESSURE: 134 MMHG | OXYGEN SATURATION: 100 % | RESPIRATION RATE: 18 BRPM | HEART RATE: 69 BPM | DIASTOLIC BLOOD PRESSURE: 84 MMHG | TEMPERATURE: 97.4 F

## 2024-07-16 DIAGNOSIS — R63.4 WEIGHT LOSS: ICD-10-CM

## 2024-07-16 DIAGNOSIS — R11.0 NAUSEA: ICD-10-CM

## 2024-07-16 DIAGNOSIS — Z86.010 PERSONAL HISTORY OF COLONIC POLYPS: ICD-10-CM

## 2024-07-16 DIAGNOSIS — R10.13 EPIGASTRIC PAIN: ICD-10-CM

## 2024-07-16 PROCEDURE — 43239 EGD BIOPSY SINGLE/MULTIPLE: CPT | Performed by: INTERNAL MEDICINE

## 2024-07-16 PROCEDURE — G0105 COLORECTAL SCRN; HI RISK IND: HCPCS | Performed by: INTERNAL MEDICINE

## 2024-07-16 PROCEDURE — 88305 TISSUE EXAM BY PATHOLOGIST: CPT | Performed by: PATHOLOGY

## 2024-07-16 RX ORDER — LIDOCAINE HYDROCHLORIDE 10 MG/ML
INJECTION, SOLUTION EPIDURAL; INFILTRATION; INTRACAUDAL; PERINEURAL AS NEEDED
Status: DISCONTINUED | OUTPATIENT
Start: 2024-07-16 | End: 2024-07-16

## 2024-07-16 RX ORDER — SODIUM CHLORIDE 9 MG/ML
INJECTION, SOLUTION INTRAVENOUS CONTINUOUS PRN
Status: DISCONTINUED | OUTPATIENT
Start: 2024-07-16 | End: 2024-07-16

## 2024-07-16 RX ORDER — PROPOFOL 10 MG/ML
INJECTION, EMULSION INTRAVENOUS AS NEEDED
Status: DISCONTINUED | OUTPATIENT
Start: 2024-07-16 | End: 2024-07-16

## 2024-07-16 RX ADMIN — PROPOFOL 50 MG: 10 INJECTION, EMULSION INTRAVENOUS at 11:05

## 2024-07-16 RX ADMIN — PROPOFOL 100 MG: 10 INJECTION, EMULSION INTRAVENOUS at 10:48

## 2024-07-16 RX ADMIN — SODIUM CHLORIDE: 0.9 INJECTION, SOLUTION INTRAVENOUS at 10:33

## 2024-07-16 RX ADMIN — PROPOFOL 80 MCG/KG/MIN: 10 INJECTION, EMULSION INTRAVENOUS at 11:00

## 2024-07-16 RX ADMIN — LIDOCAINE HYDROCHLORIDE 50 MG: 10 INJECTION, SOLUTION EPIDURAL; INFILTRATION; INTRACAUDAL; PERINEURAL at 10:48

## 2024-07-16 RX ADMIN — PROPOFOL 50 MG: 10 INJECTION, EMULSION INTRAVENOUS at 10:54

## 2024-07-16 NOTE — INTERVAL H&P NOTE
H&P reviewed. After examining the patient I find no changes in the patients condition since the H&P had been written.    Vitals:    07/16/24 1028   BP: 119/73   Pulse: 78   Resp: 16   Temp: 99.6 °F (37.6 °C)   SpO2: 97%

## 2024-07-16 NOTE — ANESTHESIA PREPROCEDURE EVALUATION
Procedure:  COLONOSCOPY  EGD    Relevant Problems   CARDIO   (+) Chronic midline thoracic back pain   (+) Hyperlipidemia      GI/HEPATIC   (+) Gastroesophageal reflux disease with esophagitis without hemorrhage      /RENAL   (+) BPH (benign prostatic hyperplasia)      MUSCULOSKELETAL   (+) Chronic back pain   (+) Chronic midline low back pain without sciatica   (+) Chronic midline thoracic back pain   (+) Sciatic leg pain      NEURO/PSYCH   (+) Bipolar affective disorder, depressed, severe (HCC)   (+) Chronic back pain   (+) Chronic midline low back pain without sciatica   (+) Chronic midline thoracic back pain   (+) Continuous opioid dependence (HCC)   (+) Generalized anxiety disorder      PULMONARY   (+) Bullous emphysema (HCC)   (+) COPD, very severe (HCC)        Physical Exam    Airway    Mallampati score: I  TM Distance: >3 FB  Neck ROM: limited     Dental        Cardiovascular      Pulmonary   Decreased breath sounds, No wheezes    Other Findings        Anesthesia Plan  ASA Score- 4     Anesthesia Type- IV sedation with anesthesia with ASA Monitors.         Additional Monitors:     Airway Plan:     Comment: Worse pulm fnx w/ bronchodialator; NO PREOP NEB  TIVA + Relaxant  Decadron 10.       Plan Factors-    Chart reviewed. EKG reviewed. Imaging results reviewed. Existing labs reviewed. Patient summary reviewed.    Patient is not a current smoker.  Patient did not smoke on day of surgery.            Induction- intravenous.    Postoperative Plan- . Planned trial extubation        Informed Consent- Anesthetic plan and risks discussed with patient.  I personally reviewed this patient with the CRNA. Discussed and agreed on the Anesthesia Plan with the CRNA..    VITALS  There were no vitals taken for this visit.  BP Readings from Last 3 Encounters:   07/03/24 101/69   05/31/24 124/62   05/23/24 141/87     LABS  Results from Last 12 Months   Lab Units 05/23/24  1241 03/01/24  0449 02/29/24  0416 02/27/24  0432  11/14/23  0400 11/13/23  0321 11/12/23  0848 11/12/23  0159   SODIUM mmol/L 136 138 137 138   < > 135  --  135   POTASSIUM mmol/L 4.3 3.7 4.1 4.4   < > 5.0  --  4.2   CHLORIDE mmol/L 102 102 99 102   < > 106  --  99*   CO2 mmol/L 28 28 31 30   < > 21  --  26   ANION GAP mmol/L 6 8 7 6   < > 8  --  10   BUN mg/dL 17 19 18 18   < > 22  --  26   CREATININE mg/dL 0.88 0.67 0.84 0.80   < > 0.91  --  0.93   CALCIUM mg/dL 9.4 8.9 9.1 9.4   < > 9.2  --  9.7   GLUCOSE RANDOM mg/dL 102 94 108 139   < > 139*  --  296*   PHOSPHORUS mg/dL  --   --   --  3.3  --   --   --   --    MAGNESIUM mg/dL  --   --  2.1 1.8*  --   --   --   --    AST U/L 13  --   --   --   --   --   --  18   ALT U/L 12  --   --   --   --   --   --  16   ALK PHOS U/L 61  --   --   --   --   --   --  58   TOTAL BILIRUBIN mg/dL 0.35  --   --   --   --   --   --  0.3   ALBUMIN g/dL 4.1  --   --   --   --   --   --  4.5   HEMOGLOBIN A1C %  --   --   --   --   --  5.6 5.8*  --     < > = values in this interval not displayed.     Results from Last 12 Months   Lab Units 05/23/24  1241 03/01/24  0449   WBC Thousand/uL 6.54 7.48   HEMOGLOBIN g/dL 14.0 13.2   HEMATOCRIT % 43.2 41.6   PLATELETS Thousands/uL 300 230     Results from Last 12 Months   Lab Units 05/23/24  1241 09/07/23  0417 09/06/23  2200   APTT sec  --  21.3* 24.9   INR  1.09  --   --    PTT seconds 28  --   --        ECG  2015  Normal sinus rhythm  Normal ECG  No previous ECGs available  Confirmed by DO LU GERALD ([id number]) on 4/8/2015 9:12:59 AM    ECHOCARDIOGRAPHY AND OTHER TESTING/IMAGING  1/2024 TTE    History    Severe COPD, Takotsubo cardiomyopathy     Interpretation Summary         Left Ventricle: Left ventricular cavity size is normal. Wall thickness is normal. The left ventricular ejection fraction is 61% by biplane measurement. Systolic function is normal. Wall motion is normal. Diastolic function is normal.  Left atrial filling pressure is normal.    Prior TTE study available for  comparison. Prior study date: 11/16/2023. Changes noted when compared to prior study. Changes include: Previous echocardiogram demonstrated: LV EF 40-45%.  Hypokinesis of the distal walls and apex consistent with Takotsubo syndrome.. Prior done at [facility].    ANESTHESIA RISK-BENEFIT DISCUSSION  BENEFITS INCLUDE (NBK [id number], PMID [id number]):   (1) A specialized anesthesia team reduces mortality and morbidity for major surgeries.  (2) The team provides analgesia/sedation/amnesia/akinesia as safely as possible.  (3) The team strives to reduce discomfort as much as reasonably possible.    RISKS ASSESSMENT (AND PLANS TO MITIGATE RISKS) INCLUDE:    Neurologic system: IntraOp awareness (Risk is ~1:1,000 - 1:14,000; PMID 73611496), Stroke (Risk ~<0.1-2% for most cases; PMID 09910916), nerve injury, vision loss, and POCD.     Airway and Pulmonary system: Dental or mouth injury, throat pain, critical hypoxia, pneumothorax, prolonged intubation, post-op respiratory compromise.  Airway/Intubation risks and prior data: Ventilated by mask (1); Cuffed, Oral; 8 mm; Direct laryngoscopy, Stylet, Cricoid Pressure; Mac; 3; Auscultation, End tidal CO2, Symmetrical chest wall movement, Equal   Major ARISCAT risk factors for pulmonary complications include: Intrathoracic Surgery: 2 pts and Other factors/Clinical gestalt: 1 pt, yielding a score 2-3= Intermediate risk, 13.3%.  Cardiovascular system: Hypotension/Vasoplegia, arrhythmias, blood clot, chauncey-op cardiac injury/MACE, bleeding, infection, or injury to vascular structures.  Signs of active, severe cardiac instability: none  Victor M's RCRI score items: Surgical risk, yielding an RCRI Score of 1= 0.6% risk of MACE  Are chauncey-op or intra-op beta blockers indicated? (PMID 98568784): no  FEN/GI system: Aspiration risk (~0.5% [facility] [id number]) and PONV (10-80% per Apfel score).  ASA NPO guideline compliance?: yes  Medication risk assessment: Allergic reactions, bleeding due to anticoagulant use,  overdoses, drug-drug interactions, injury to a fetus or  in pregnant or breastfeeding patients, sedation while driving/operating heavy machinery.  Recent relevant medications: yes: Steroids  Personal or family history of anesthesia complications: no  Pregnancy Status: N/A  Estimate mortality risks associated with anesthesia based on ASA-PS (PMID 06899071): ASA-PS IV: risk 1:1,800

## 2024-07-16 NOTE — ANESTHESIA POSTPROCEDURE EVALUATION
Post-Op Assessment Note    CV Status:  Stable  Pain Score: 0    Pain management: adequate       Mental Status:  Awake and sleepy   Hydration Status:  Euvolemic   PONV Controlled:  Controlled   Airway Patency:  Patent  Two or more mitigation strategies used for obstructive sleep apnea   Post Op Vitals Reviewed: Yes    No anethesia notable event occurred.    Staff: LINDY               BP 95/58 (07/16/24 1156)    Temp (!) 97.4 °F (36.3 °C) (07/16/24 1156)    Pulse 74 (07/16/24 1156)   Resp 18 (07/16/24 1156)    SpO2 98 % (07/16/24 1156)

## 2024-07-19 PROCEDURE — 88305 TISSUE EXAM BY PATHOLOGIST: CPT | Performed by: PATHOLOGY

## 2024-07-19 NOTE — RESULT ENCOUNTER NOTE
Hi Mr Flores,  I am happy to report that all of the biopsy results from your endoscopy done on 7/16/24 have returned and they appear normal. This is good news. Yo do not have Roche's esophagus. Please reach out to me if you have any further questions or concerns.    Kemal Ramirez MD  Fellow,   Department of Gastroenterology,  [facility].    Sent MCM as above.

## 2024-08-12 ENCOUNTER — TELEPHONE (OUTPATIENT)
Dept: GASTROENTEROLOGY | Facility: CLINIC | Age: 69
End: 2024-08-12

## 2024-10-16 ENCOUNTER — TELEPHONE (OUTPATIENT)
Age: 69
End: 2024-10-16

## 2024-12-05 ENCOUNTER — OFFICE VISIT (OUTPATIENT)
Dept: FAMILY MEDICINE CLINIC | Facility: CLINIC | Age: 69
End: 2024-12-05
Payer: MEDICARE

## 2024-12-05 VITALS
RESPIRATION RATE: 16 BRPM | TEMPERATURE: 97.9 F | WEIGHT: 131 LBS | SYSTOLIC BLOOD PRESSURE: 126 MMHG | BODY MASS INDEX: 19.4 KG/M2 | DIASTOLIC BLOOD PRESSURE: 70 MMHG | HEIGHT: 69 IN

## 2024-12-05 DIAGNOSIS — J44.9 COPD, VERY SEVERE (HCC): Primary | ICD-10-CM

## 2024-12-05 DIAGNOSIS — R91.1 PULMONARY NODULE SEEN ON IMAGING STUDY: ICD-10-CM

## 2024-12-05 DIAGNOSIS — J43.9 BULLOUS EMPHYSEMA (HCC): ICD-10-CM

## 2024-12-05 DIAGNOSIS — F31.4 BIPOLAR AFFECTIVE DISORDER, DEPRESSED, SEVERE (HCC): ICD-10-CM

## 2024-12-05 DIAGNOSIS — F11.20 CONTINUOUS OPIOID DEPENDENCE (HCC): ICD-10-CM

## 2024-12-05 DIAGNOSIS — E78.00 PURE HYPERCHOLESTEROLEMIA: ICD-10-CM

## 2024-12-05 PROBLEM — E44.1 MILD PROTEIN-CALORIE MALNUTRITION (HCC): Status: RESOLVED | Noted: 2023-07-05 | Resolved: 2024-12-05

## 2024-12-05 PROCEDURE — G2211 COMPLEX E/M VISIT ADD ON: HCPCS | Performed by: NURSE PRACTITIONER

## 2024-12-05 PROCEDURE — 99214 OFFICE O/P EST MOD 30 MIN: CPT | Performed by: NURSE PRACTITIONER

## 2024-12-05 RX ORDER — ATORVASTATIN CALCIUM 80 MG/1
TABLET, FILM COATED ORAL
COMMUNITY
Start: 2024-06-12

## 2024-12-05 RX ORDER — PREDNISONE 5 MG/1
5 TABLET ORAL DAILY
Qty: 30 TABLET | Refills: 1 | Status: SHIPPED | OUTPATIENT
Start: 2024-12-05

## 2024-12-05 RX ORDER — POLYETHYLENE GLYCOL 3350 17 G
POWDER IN PACKET (EA) ORAL
COMMUNITY
Start: 2024-08-07

## 2024-12-05 NOTE — ASSESSMENT & PLAN NOTE
Depression Screening Follow-up Plan: Patient's depression screening was positive with a PHQ-2 score of 4. Their PHQ-9 score was 12. Continue regular follow-up with their psychologist/therapist/psychiatrist who is managing their mental health condition(s).    No currently on medication- managed off of medication well. States he feels okay. Denies si.

## 2024-12-05 NOTE — PATIENT INSTRUCTIONS
Labs due- these are fasting. PSA/Cholesterol.     Follow up with pulmonology.     Re-start prednisone 5 mg daily.     Please call the office if you are experiencing any worsening of symptoms or no symptom improvement.

## 2024-12-05 NOTE — PROGRESS NOTES
Name: Samuel Flores Jr.      : 1955      MRN: [id number]  Encounter Provider: ARTURO Lira  Encounter Date: 2024   Encounter department: [facility] PRIMARY CARE  :  Assessment & Plan  COPD, very severe (HCC)  Follows with pulm for management- needs to be seen asap. Symptoms poorly controlled. Message sent to staff to get him pulm appointment ASAP as he is moving in 6 weeks.   Prednisone sent to re-start as he weaned himself off of this.       Orders:    predniSONE 5 mg tablet; Take 1 tablet (5 mg total) by mouth daily    Bipolar affective disorder, depressed, severe (HCC)  Depression Screening Follow-up Plan: Patient's depression screening was positive with a PHQ-2 score of 4. Their PHQ-9 score was 12. Continue regular follow-up with their psychologist/therapist/psychiatrist who is managing their mental health condition(s).    No currently on medication- managed off of medication well. States he feels okay. Denies si.          Bullous emphysema (HCC)  Follow up with pulmonology         Pulmonary nodule seen on imaging study  CT screening up to date- follows with pulm for management         Continuous opioid dependence (HCC)  Managed by pain management.         Pure hypercholesterolemia  [facility].    Labs due.              [facility].   Moving next month.        History of Present Illness     Patient presents with:  Follow-up: 6 month follow up     Moving to [place] in 6 weeks.   Was in [facility] for COPD exacerbation in October.   Breathing not well controlled- feels like breathing is back to where it was before valve placement. He states he asked hospital to notify his pulmonologist about admission but they never did.     He weaned himself off of prednisone 4 months ago- did 2.5 mg daily prior to stopped.                Review of Systems   Constitutional:  Negative for chills and fever.   Eyes:  Negative for discharge.   Respiratory:  Positive for  "shortness of breath.    Cardiovascular:  Negative for chest pain.   Gastrointestinal:  Negative for constipation and diarrhea.   Genitourinary:  Negative for difficulty urinating.   Musculoskeletal:  Negative for joint swelling.   Skin:  Negative for rash.   Neurological:  Negative for headaches.   Hematological:  Negative for adenopathy.   Psychiatric/Behavioral:  The patient is not nervous/anxious.           Objective   /70   Temp 97.9 °F (36.6 °C) (Temporal)   Resp 16   Ht 5' 9\" (1.753 m)   Wt 59.4 kg (131 lb)   BMI 19.35 kg/m²      Physical Exam  Vitals and nursing note reviewed.   Constitutional:       General: He is not in acute distress.     Appearance: Normal appearance. He is well-developed. He is not diaphoretic.   HENT:      Head: Normocephalic and atraumatic.      Right Ear: External ear normal.      Left Ear: External ear normal.   Eyes:      General: Lids are normal.         Right eye: No discharge.         Left eye: No discharge.      Conjunctiva/sclera: Conjunctivae normal.   Cardiovascular:      Rate and Rhythm: Normal rate and regular rhythm.      Heart sounds: No murmur heard.  Pulmonary:      Effort: Pulmonary effort is normal.      Breath sounds: Decreased air movement present. Decreased breath sounds present. No wheezing, rhonchi or rales.   Musculoskeletal:         General: No deformity.   Skin:     General: Skin is warm and dry.   Neurological:      General: No focal deficit present.      Mental Status: He is alert and oriented to person, place, and time.   Psychiatric:         Speech: Speech normal.         Behavior: Behavior normal.         Thought Content: Thought content normal.         Judgment: Judgment normal.         "

## 2024-12-05 NOTE — ASSESSMENT & PLAN NOTE
Follows with pulm for management- needs to be seen asap. Symptoms poorly controlled. Message sent to staff to get him pulm appointment ASAP as he is moving in 6 weeks.   Prednisone sent to re-start as he weaned himself off of this.       Orders:    predniSONE 5 mg tablet; Take 1 tablet (5 mg total) by mouth daily

## 2024-12-20 ENCOUNTER — OFFICE VISIT (OUTPATIENT)
Dept: PULMONOLOGY | Facility: CLINIC | Age: 69
End: 2024-12-20
Payer: MEDICARE

## 2024-12-20 VITALS
OXYGEN SATURATION: 96 % | HEART RATE: 104 BPM | WEIGHT: 134.4 LBS | SYSTOLIC BLOOD PRESSURE: 124 MMHG | DIASTOLIC BLOOD PRESSURE: 82 MMHG | TEMPERATURE: 96.1 F | BODY MASS INDEX: 19.85 KG/M2

## 2024-12-20 DIAGNOSIS — J44.9 COPD, VERY SEVERE (HCC): Primary | ICD-10-CM

## 2024-12-20 PROCEDURE — 99214 OFFICE O/P EST MOD 30 MIN: CPT | Performed by: INTERNAL MEDICINE

## 2024-12-20 NOTE — LETTER
December 20, 2024     Hesham Davenport MD  [address]  [place] [address]    Patient: Samuel Flores Jr.   YOB: 1955   Date of Visit: 12/20/2024       Dear Dr. Davenport:    Thank you for referring Samuel Flores to me for evaluation. Below are my notes for this consultation.    If you have questions, please do not hesitate to call me. I look forward to following your patient along with you.         Sincerely,        Lorraine Olivarez DO        CC: No Recipients    Lorraine Olivarez DO  12/20/2024  2:21 PM  Sign when Signing Visit  Pulmonary Follow Up Note   Samuel Flores Jr. 69 y.o. male MRN: [id number]  12/20/2024      Assessment/Plan:     COPD, very severe (HCC)  S/p Bronchoscopic lung volume reduction  Date of procedure: 2/26/2024  Lobe(s) treated: LLL  Number of valves: 2       10/4/23 3/26/24 % change   FEV1, L 0.63 L 1.03 L +63%   FEV1, % pred 20 % 33 %     RV, L 6.68 L 5.25 L -21%   RV, % pred 279 % 218 %     6MWD 126 m 138 m       Patient reports worsening shortness of breath since recent hospitalization for exacerbation.  Prior to him moving to [place], we will update PFTs and chest CT.  I have also reached out to the Pulmonx team so that we can connect the patient with a physician in [place] that is familiar with [facility].  Ultimately, patient may require lung transplantation.  He will continue Trelegy Ellipta once daily and albuterol as needed.  He will also continue with low-dose prednisone for now.  I will call him with results of testing as well as referral for a physician in [place].      Visit orders:    Diagnoses and all orders for this visit:    COPD, very severe (HCC)  -     Complete PFT with post Bronchodilator and Six Minute walk; Future  -     CT chest without contrast; Future    Other orders  -     fluticasone-umeclidinium-vilanterol 200-62.5-25 mcg/actuation AEPB inhaler; Inhale          History of Present Illness  HPI:  Samuel Flores Jr. is a 69 y.o. male who is here today  for follow-up regarding severe COPD status post bronchoscopic lung volume reduction in February 2024.  Since his last visit with me, he required hospitalization for COPD exacerbation.  He was treated with a course of antibiotics and steroids.  He feels as if he has not regained ground back from having the exacerbation.  He is now on prednisone 5 mg daily and has been compliant with Trelegy Ellipta.  He has no severe cough, wheeze or sputum production.  He becomes short of breath with exertion.  He is moving to [place] with his daughter next month.    Review of Systems otherwise negative    Medical, Family and Social history reviewed and updated as appropriate    Historical Information  Past Medical History:   Diagnosis Date   • Abnormal chest x-ray with multiple lung nodules     last assessed: April 25, 2012   • Anxiety    • Arthritis    • Bipolar 2 disorder, major depressive episode (HCC)    • Chronic tension headaches     last assessed: April 25, 2012   • COPD (chronic obstructive pulmonary disease) ([facility])    • Depression 08/17/1980   • Emphysema of lung ([facility]) 1/01/2017    Noticed from my first ct scan   • GERD (gastroesophageal reflux disease)     Not sure of first occurance. Haven’t experienced any recent bouts.   • Hypertension    • Insomnia    • Mild protein-calorie malnutrition ([facility]) 07/05/2023   • Rheumatoid arthritis ([facility]) 10/01/1975   • Scoliosis    • Substance abuse ([facility]) 1/01/1970    Use of marajuana. Some experiments with cocain, crank and lsd.     Past Surgical History:   Procedure Laterality Date   • CHOLECYSTECTOMY OPEN  01/2021   • COLONOSCOPY      1-2016 [facility]   • ESOPHAGUS SURGERY  02/2024    Valves   • TONSILLECTOMY       Family History   Problem Relation Age of Onset   • Stroke Mother    • Other Mother         epilepsy    • Vision loss Mother    • Prostate cancer Father    • Dementia Father    • Depression Brother    • Bipolar disorder Brother    • Heart disease Brother        Social History      Tobacco Use   Smoking Status Former   • Current packs/day: 0.00   • Average packs/day: 1 pack/day for 54.0 years (54.0 ttl pk-yrs)   • Types: Cigarettes   • Start date: 1969   • Quit date: 2023   • Years since quittin.2   Smokeless Tobacco Never     Meds/Allergies    Current Outpatient Medications:   •  atorvastatin (LIPITOR) 80 mg tablet, Take by mouth, Disp: , Rfl:   •  Cholecalciferol 25 MCG (1000 UT) capsule, Take 1,000 Units by mouth daily, Disp: , Rfl:   •  finasteride (PROSCAR) 5 mg tablet, Take 1 tablet (5 mg total) by mouth daily for 360 doses, Disp: 90 tablet, Rfl: 3  •  fluticasone-umeclidinium-vilanterol 200-62.5-25 mcg/actuation AEPB inhaler, Inhale, Disp: , Rfl:   •  losartan (COZAAR) 25 mg tablet, Take 12.5 mg by mouth daily, Disp: , Rfl:   •  nicotine polacrilex (COMMIT) 2 MG lozenge, Take by mouth, Disp: , Rfl:   •  pantoprazole (PROTONIX) 40 mg tablet, Take 1 tablet (40 mg total) by mouth 2 (two) times a day, Disp: 180 tablet, Rfl: 3  •  predniSONE 5 mg tablet, Take 1 tablet (5 mg total) by mouth daily, Disp: 30 tablet, Rfl: 1  •  tamsulosin (FLOMAX) 0.4 mg, TAKE 2 CAPSULES (0.8 MG TOTAL) BY MOUTH DAILY AT BEDTIME, Disp: 180 capsule, Rfl: 1  Allergies   Allergen Reactions   • Albuterol Shortness Of Breath       Vitals: Blood pressure 124/82, pulse 104, temperature (!) 96.1 °F (35.6 °C), temperature source Tympanic Core, weight 61 kg (134 lb 6.4 oz), SpO2 96%. Body mass index is 19.85 kg/m². Oxygen Therapy  SpO2: 96 %  Oxygen Therapy: None (Room air)    Physical Exam   Physical Exam  Vitals reviewed.   Constitutional:       General: He is not in acute distress.     Appearance: He is well-developed.   Eyes:      General: No scleral icterus.  Neck:      Vascular: No JVD.   Cardiovascular:      Rate and Rhythm: Normal rate and regular rhythm.   Pulmonary:      Breath sounds: No wheezing, rhonchi or rales.   Abdominal:      Tenderness: There is no abdominal tenderness.   Skin:      "General: Skin is warm and dry.   Neurological:      Mental Status: He is alert and oriented to person, place, and time.   Psychiatric:         Mood and Affect: Mood normal.       Labs: I have personally reviewed pertinent lab results.  Lab Results   Component Value Date    WBC 6.54 05/23/2024    HGB 14.0 05/23/2024    HCT 43.2 05/23/2024    MCV 94 05/23/2024     05/23/2024     Lab Results   Component Value Date    GLUCOSE 86 06/21/2016    CALCIUM 9.5 10/26/2024     06/21/2016    K 4.1 10/26/2024    CO2 28 10/26/2024    CL 99 (L) 10/26/2024    BUN 23 10/26/2024    CREATININE 0.90 10/26/2024     No results found for: \"IGE\"  Lab Results   Component Value Date    ALT 10 10/25/2024    AST 15 10/25/2024    ALKPHOS 76 10/25/2024    BILITOT <0.2 06/21/2016     "

## 2024-12-20 NOTE — ASSESSMENT & PLAN NOTE
S/p Bronchoscopic lung volume reduction  Date of procedure: 2/26/2024  Lobe(s) treated: LLL  Number of valves: 2       10/4/23 3/26/24 % change   FEV1, L 0.63 L 1.03 L +63%   FEV1, % pred 20 % 33 %     RV, L 6.68 L 5.25 L -21%   RV, % pred 279 % 218 %     6MWD 126 m 138 m       Patient reports worsening shortness of breath since recent hospitalization for exacerbation.  Prior to him moving to [place], we will update PFTs and chest CT.  I have also reached out to the Pulmonx team so that we can connect the patient with a physician in [place] that is familiar with [facility].  Ultimately, patient may require lung transplantation.  He will continue Trelegy Ellipta once daily and albuterol as needed.  He will also continue with low-dose prednisone for now.  I will call him with results of testing as well as referral for a physician in [place].

## 2024-12-20 NOTE — PROGRESS NOTES
Pulmonary Follow Up Note   Samuel Flores Jr. 69 y.o. male MRN: [id number]  12/20/2024      Assessment/Plan:     COPD, very severe (HCC)  S/p Bronchoscopic lung volume reduction  Date of procedure: 2/26/2024  Lobe(s) treated: LLL  Number of valves: 2       10/4/23 3/26/24 % change   FEV1, L 0.63 L 1.03 L +63%   FEV1, % pred 20 % 33 %     RV, L 6.68 L 5.25 L -21%   RV, % pred 279 % 218 %     6MWD 126 m 138 m       Patient reports worsening shortness of breath since recent hospitalization for exacerbation.  Prior to him moving to [place], we will update PFTs and chest CT.  I have also reached out to the [facility] team so that we can connect the patient with a physician in [place] that is familiar with [facility].  Ultimately, patient may require lung transplantation.  He will continue Trelegy Ellipta once daily and albuterol as needed.  He will also continue with low-dose prednisone for now.  I will call him with results of testing as well as referral for a physician in [place].      Visit orders:    Diagnoses and all orders for this visit:    COPD, very severe (HCC)  -     Complete PFT with post Bronchodilator and Six Minute walk; Future  -     CT chest without contrast; Future    Other orders  -     fluticasone-umeclidinium-vilanterol 200-62.5-25 mcg/actuation AEPB inhaler; Inhale          History of Present Illness   HPI:  Samuel Flores Jr. is a 69 y.o. male who is here today for follow-up regarding severe COPD status post bronchoscopic lung volume reduction in February 2024.  Since his last visit with me, he required hospitalization for COPD exacerbation.  He was treated with a course of antibiotics and steroids.  He feels as if he has not regained ground back from having the exacerbation.  He is now on prednisone 5 mg daily and has been compliant with Trelegy Ellipta.  He has no severe cough, wheeze or sputum production.  He becomes short of breath with exertion.  He is moving to [place] with his daughter next  month.    Review of Systems otherwise negative    Medical, Family and Social history reviewed and updated as appropriate    Historical Information   Past Medical History:   Diagnosis Date    Abnormal chest x-ray with multiple lung nodules     last assessed: 2012    Anxiety     Arthritis     Bipolar 2 disorder, major depressive episode ([facility])     Chronic tension headaches     last assessed: 2012    COPD (chronic obstructive pulmonary disease) ([facility])     Depression 1980    Emphysema of lung ([facility]) 2017    Noticed from my first ct scan    GERD (gastroesophageal reflux disease)     Not sure of first occurance. Haven’t experienced any recent bouts.    Hypertension     Insomnia     Mild protein-calorie malnutrition ([facility]) 2023    Rheumatoid arthritis ([facility]) 10/01/1975    Scoliosis     Substance abuse ([facility]) 1970    Use of marajuana. Some experiments with cocain, crank and lsd.     Past Surgical History:   Procedure Laterality Date    CHOLECYSTECTOMY OPEN  2021    COLONOSCOPY       [facility]    ESOPHAGUS SURGERY  2024    Valves    TONSILLECTOMY       Family History   Problem Relation Age of Onset    Stroke Mother     Other Mother         epilepsy     Vision loss Mother     Prostate cancer Father     Dementia Father     Depression Brother     Bipolar disorder Brother     Heart disease Brother        Social History     Tobacco Use   Smoking Status Former    Current packs/day: 0.00    Average packs/day: 1 pack/day for 54.0 years (54.0 ttl pk-yrs)    Types: Cigarettes    Start date: 1969    Quit date: 2023    Years since quittin.2   Smokeless Tobacco Never     Meds/Allergies     Current Outpatient Medications:     atorvastatin (LIPITOR) 80 mg tablet, Take by mouth, Disp: , Rfl:     Cholecalciferol 25 MCG (1000 UT) capsule, Take 1,000 Units by mouth daily, Disp: , Rfl:     finasteride (PROSCAR) 5 mg tablet, Take 1 tablet (5 mg total) by mouth daily for 360 doses, Disp: 90  tablet, Rfl: 3    fluticasone-umeclidinium-vilanterol 200-62.5-25 mcg/actuation AEPB inhaler, Inhale, Disp: , Rfl:     losartan (COZAAR) 25 mg tablet, Take 12.5 mg by mouth daily, Disp: , Rfl:     nicotine polacrilex (COMMIT) 2 MG lozenge, Take by mouth, Disp: , Rfl:     pantoprazole (PROTONIX) 40 mg tablet, Take 1 tablet (40 mg total) by mouth 2 (two) times a day, Disp: 180 tablet, Rfl: 3    predniSONE 5 mg tablet, Take 1 tablet (5 mg total) by mouth daily, Disp: 30 tablet, Rfl: 1    tamsulosin (FLOMAX) 0.4 mg, TAKE 2 CAPSULES (0.8 MG TOTAL) BY MOUTH DAILY AT BEDTIME, Disp: 180 capsule, Rfl: 1  Allergies   Allergen Reactions    Albuterol Shortness Of Breath       Vitals: Blood pressure 124/82, pulse 104, temperature (!) 96.1 °F (35.6 °C), temperature source Tympanic Core, weight 61 kg (134 lb 6.4 oz), SpO2 96%. Body mass index is 19.85 kg/m². Oxygen Therapy  SpO2: 96 %  Oxygen Therapy: None (Room air)    Physical Exam   Physical Exam  Vitals reviewed.   Constitutional:       General: He is not in acute distress.     Appearance: He is well-developed.   Eyes:      General: No scleral icterus.  Neck:      Vascular: No JVD.   Cardiovascular:      Rate and Rhythm: Normal rate and regular rhythm.   Pulmonary:      Breath sounds: No wheezing, rhonchi or rales.   Abdominal:      Tenderness: There is no abdominal tenderness.   Skin:     General: Skin is warm and dry.   Neurological:      Mental Status: He is alert and oriented to person, place, and time.   Psychiatric:         Mood and Affect: Mood normal.       Labs: I have personally reviewed pertinent lab results.  Lab Results   Component Value Date    WBC 6.54 05/23/2024    HGB 14.0 05/23/2024    HCT 43.2 05/23/2024    MCV 94 05/23/2024     05/23/2024     Lab Results   Component Value Date    GLUCOSE 86 06/21/2016    CALCIUM 9.5 10/26/2024     06/21/2016    K 4.1 10/26/2024    CO2 28 10/26/2024    CL 99 (L) 10/26/2024    BUN 23 10/26/2024    CREATININE 0.90  "10/26/2024     No results found for: \"IGE\"  Lab Results   Component Value Date    ALT 10 10/25/2024    AST 15 10/25/2024    ALKPHOS 76 10/25/2024    BILITOT <0.2 06/21/2016     "

## 2024-12-23 LAB
CHOLEST SERPL-MCNC: 231 MG/DL
CHOLEST/HDLC SERPL: 2.4 {RATIO}
HDLC SERPL-MCNC: 98 MG/DL (ref 23–92)
LDLC SERPL CALC-MCNC: 123 MG/DL
NONHDLC SERPL-MCNC: 133 MG/DL
PSA SERPL-MCNC: 0.47 NG/ML
TRIGL SERPL-MCNC: 52 MG/DL
TSH SERPL-ACNC: 2.13 UIU/ML (ref 0.45–5.33)

## 2024-12-24 ENCOUNTER — RESULTS FOLLOW-UP (OUTPATIENT)
Dept: FAMILY MEDICINE CLINIC | Facility: CLINIC | Age: 69
End: 2024-12-24

## 2025-01-13 ENCOUNTER — HOSPITAL ENCOUNTER (OUTPATIENT)
Dept: PULMONOLOGY | Facility: HOSPITAL | Age: 70
Discharge: HOME | End: 2025-01-13
Attending: INTERNAL MEDICINE

## 2025-01-13 RX ORDER — ALBUTEROL SULFATE 0.83 MG/ML
2.5 SOLUTION RESPIRATORY (INHALATION) ONCE
Status: DISCONTINUED | OUTPATIENT
Start: 2025-01-13 | End: 2025-01-17 | Stop reason: HOSPADM

## 2025-02-17 ENCOUNTER — TELEPHONE (OUTPATIENT)
Age: 70
End: 2025-02-17

## 2025-02-17 NOTE — TELEPHONE ENCOUNTER
Contacted patient off of [facility]  to verify needs of services in attempts to update wait list. LVM to cotact us at [phone] to update wait list if still interested in services.

## 2025-05-12 ENCOUNTER — TELEPHONE (OUTPATIENT)
Dept: FAMILY MEDICINE CLINIC | Facility: CLINIC | Age: 70
End: 2025-05-12